# Patient Record
Sex: MALE | Race: WHITE | Employment: FULL TIME | URBAN - METROPOLITAN AREA
[De-identification: names, ages, dates, MRNs, and addresses within clinical notes are randomized per-mention and may not be internally consistent; named-entity substitution may affect disease eponyms.]

---

## 2018-09-16 ENCOUNTER — OFFICE VISIT (OUTPATIENT)
Dept: URGENT CARE | Facility: URGENT CARE | Age: 28
End: 2018-09-16
Payer: COMMERCIAL

## 2018-09-16 ENCOUNTER — APPOINTMENT (OUTPATIENT)
Dept: CT IMAGING | Facility: CLINIC | Age: 28
DRG: 272 | End: 2018-09-16
Attending: EMERGENCY MEDICINE
Payer: COMMERCIAL

## 2018-09-16 ENCOUNTER — APPOINTMENT (OUTPATIENT)
Dept: ULTRASOUND IMAGING | Facility: CLINIC | Age: 28
DRG: 272 | End: 2018-09-16
Attending: EMERGENCY MEDICINE
Payer: COMMERCIAL

## 2018-09-16 ENCOUNTER — HOSPITAL ENCOUNTER (INPATIENT)
Facility: CLINIC | Age: 28
LOS: 3 days | Discharge: HOME | DRG: 272 | End: 2018-09-19
Attending: EMERGENCY MEDICINE | Admitting: HOSPITALIST
Payer: COMMERCIAL

## 2018-09-16 VITALS
DIASTOLIC BLOOD PRESSURE: 70 MMHG | HEART RATE: 86 BPM | TEMPERATURE: 99.1 F | SYSTOLIC BLOOD PRESSURE: 101 MMHG | WEIGHT: 169.56 LBS

## 2018-09-16 DIAGNOSIS — M79.601 PAIN OF RIGHT UPPER EXTREMITY: Primary | ICD-10-CM

## 2018-09-16 DIAGNOSIS — I82.B11: ICD-10-CM

## 2018-09-16 DIAGNOSIS — M79.89 ARM SWELLING: ICD-10-CM

## 2018-09-16 PROBLEM — O22.30 DVT (DEEP VEIN THROMBOSIS) IN PREGNANCY: Status: ACTIVE | Noted: 2018-09-16

## 2018-09-16 LAB
ANION GAP SERPL CALCULATED.3IONS-SCNC: 12 MMOL/L (ref 3–14)
BASOPHILS # BLD AUTO: 0 10E9/L (ref 0–0.2)
BASOPHILS NFR BLD AUTO: 0.4 %
BUN SERPL-MCNC: 9 MG/DL (ref 7–30)
CALCIUM SERPL-MCNC: 9.8 MG/DL (ref 8.5–10.1)
CHLORIDE SERPL-SCNC: 101 MMOL/L (ref 94–109)
CO2 SERPL-SCNC: 25 MMOL/L (ref 20–32)
CREAT SERPL-MCNC: 0.92 MG/DL (ref 0.66–1.25)
D DIMER PPP FEU-MCNC: 0.9 UG/ML FEU (ref 0–0.5)
DIFFERENTIAL METHOD BLD: NORMAL
EOSINOPHIL # BLD AUTO: 0.1 10E9/L (ref 0–0.7)
EOSINOPHIL NFR BLD AUTO: 1 %
ERYTHROCYTE [DISTWIDTH] IN BLOOD BY AUTOMATED COUNT: 11.7 % (ref 10–15)
GFR SERPL CREATININE-BSD FRML MDRD: >90 ML/MIN/1.7M2
GLUCOSE SERPL-MCNC: 85 MG/DL (ref 70–99)
HCT VFR BLD AUTO: 42.2 % (ref 40–53)
HGB BLD-MCNC: 14.8 G/DL (ref 13.3–17.7)
IMM GRANULOCYTES # BLD: 0 10E9/L (ref 0–0.4)
IMM GRANULOCYTES NFR BLD: 0.2 %
LYMPHOCYTES # BLD AUTO: 2.1 10E9/L (ref 0.8–5.3)
LYMPHOCYTES NFR BLD AUTO: 19.7 %
MCH RBC QN AUTO: 30.6 PG (ref 26.5–33)
MCHC RBC AUTO-ENTMCNC: 35.1 G/DL (ref 31.5–36.5)
MCV RBC AUTO: 87 FL (ref 78–100)
MONOCYTES # BLD AUTO: 0.7 10E9/L (ref 0–1.3)
MONOCYTES NFR BLD AUTO: 6.8 %
NEUTROPHILS # BLD AUTO: 7.5 10E9/L (ref 1.6–8.3)
NEUTROPHILS NFR BLD AUTO: 71.9 %
NRBC # BLD AUTO: 0 10*3/UL
NRBC BLD AUTO-RTO: 0 /100
PLATELET # BLD AUTO: 280 10E9/L (ref 150–450)
POTASSIUM SERPL-SCNC: 3.3 MMOL/L (ref 3.4–5.3)
RBC # BLD AUTO: 4.83 10E12/L (ref 4.4–5.9)
SODIUM SERPL-SCNC: 138 MMOL/L (ref 133–144)
WBC # BLD AUTO: 10.5 10E9/L (ref 4–11)

## 2018-09-16 PROCEDURE — 12000000 ZZH R&B MED SURG/OB

## 2018-09-16 PROCEDURE — 93971 EXTREMITY STUDY: CPT | Mod: RT

## 2018-09-16 PROCEDURE — 25000132 ZZH RX MED GY IP 250 OP 250 PS 637: Performed by: HOSPITALIST

## 2018-09-16 PROCEDURE — 25000128 H RX IP 250 OP 636: Performed by: EMERGENCY MEDICINE

## 2018-09-16 PROCEDURE — 99223 1ST HOSP IP/OBS HIGH 75: CPT | Mod: AI | Performed by: HOSPITALIST

## 2018-09-16 PROCEDURE — 85379 FIBRIN DEGRADATION QUANT: CPT | Performed by: EMERGENCY MEDICINE

## 2018-09-16 PROCEDURE — 99207 ZZC CDG-MDM COMPONENT: MEETS MODERATE - UP CODED: CPT | Performed by: HOSPITALIST

## 2018-09-16 PROCEDURE — 71260 CT THORAX DX C+: CPT

## 2018-09-16 PROCEDURE — 99285 EMERGENCY DEPT VISIT HI MDM: CPT | Mod: 25

## 2018-09-16 PROCEDURE — 96376 TX/PRO/DX INJ SAME DRUG ADON: CPT

## 2018-09-16 PROCEDURE — 80048 BASIC METABOLIC PNL TOTAL CA: CPT | Performed by: EMERGENCY MEDICINE

## 2018-09-16 PROCEDURE — 25000128 H RX IP 250 OP 636

## 2018-09-16 PROCEDURE — 25000132 ZZH RX MED GY IP 250 OP 250 PS 637: Performed by: EMERGENCY MEDICINE

## 2018-09-16 PROCEDURE — 96365 THER/PROPH/DIAG IV INF INIT: CPT | Mod: 59

## 2018-09-16 PROCEDURE — 25000125 ZZHC RX 250: Performed by: EMERGENCY MEDICINE

## 2018-09-16 PROCEDURE — 96366 THER/PROPH/DIAG IV INF ADDON: CPT

## 2018-09-16 PROCEDURE — 99207 ZZC OFFICE-HOSPITAL ADMIT: CPT | Performed by: FAMILY MEDICINE

## 2018-09-16 PROCEDURE — 85025 COMPLETE CBC W/AUTO DIFF WBC: CPT | Performed by: EMERGENCY MEDICINE

## 2018-09-16 RX ORDER — HYDROCODONE BITARTRATE AND ACETAMINOPHEN 5; 325 MG/1; MG/1
1 TABLET ORAL EVERY 4 HOURS PRN
Status: DISCONTINUED | OUTPATIENT
Start: 2018-09-16 | End: 2018-09-17

## 2018-09-16 RX ORDER — LIDOCAINE 40 MG/G
CREAM TOPICAL
Status: DISCONTINUED | OUTPATIENT
Start: 2018-09-16 | End: 2018-09-19 | Stop reason: HOSPADM

## 2018-09-16 RX ORDER — PROCHLORPERAZINE MALEATE 10 MG
10 TABLET ORAL EVERY 6 HOURS PRN
Status: DISCONTINUED | OUTPATIENT
Start: 2018-09-16 | End: 2018-09-17

## 2018-09-16 RX ORDER — POTASSIUM CHLORIDE 7.45 MG/ML
10 INJECTION INTRAVENOUS
Status: DISCONTINUED | OUTPATIENT
Start: 2018-09-16 | End: 2018-09-19 | Stop reason: HOSPADM

## 2018-09-16 RX ORDER — HYDROCODONE BITARTRATE AND ACETAMINOPHEN 5; 325 MG/1; MG/1
1 TABLET ORAL
Status: ON HOLD | COMMUNITY
End: 2018-09-19

## 2018-09-16 RX ORDER — POTASSIUM CHLORIDE 1.5 G/1.58G
20-40 POWDER, FOR SOLUTION ORAL
Status: DISCONTINUED | OUTPATIENT
Start: 2018-09-16 | End: 2018-09-19 | Stop reason: HOSPADM

## 2018-09-16 RX ORDER — ONDANSETRON 2 MG/ML
4 INJECTION INTRAMUSCULAR; INTRAVENOUS EVERY 6 HOURS PRN
Status: DISCONTINUED | OUTPATIENT
Start: 2018-09-16 | End: 2018-09-17

## 2018-09-16 RX ORDER — OXYCODONE HYDROCHLORIDE 5 MG/1
5 TABLET ORAL ONCE
Status: COMPLETED | OUTPATIENT
Start: 2018-09-16 | End: 2018-09-16

## 2018-09-16 RX ORDER — AMOXICILLIN 250 MG
1 CAPSULE ORAL 2 TIMES DAILY PRN
Status: DISCONTINUED | OUTPATIENT
Start: 2018-09-16 | End: 2018-09-19 | Stop reason: HOSPADM

## 2018-09-16 RX ORDER — SODIUM CHLORIDE 9 MG/ML
INJECTION, SOLUTION INTRAVENOUS ONCE
Status: COMPLETED | OUTPATIENT
Start: 2018-09-16 | End: 2018-09-16

## 2018-09-16 RX ORDER — ONDANSETRON 4 MG/1
4 TABLET, ORALLY DISINTEGRATING ORAL EVERY 6 HOURS PRN
Status: DISCONTINUED | OUTPATIENT
Start: 2018-09-16 | End: 2018-09-17

## 2018-09-16 RX ORDER — POTASSIUM CHLORIDE 1500 MG/1
20-40 TABLET, EXTENDED RELEASE ORAL
Status: DISCONTINUED | OUTPATIENT
Start: 2018-09-16 | End: 2018-09-19 | Stop reason: HOSPADM

## 2018-09-16 RX ORDER — PROCHLORPERAZINE 25 MG
25 SUPPOSITORY, RECTAL RECTAL EVERY 12 HOURS PRN
Status: DISCONTINUED | OUTPATIENT
Start: 2018-09-16 | End: 2018-09-17

## 2018-09-16 RX ORDER — POTASSIUM CL/LIDO/0.9 % NACL 10MEQ/0.1L
10 INTRAVENOUS SOLUTION, PIGGYBACK (ML) INTRAVENOUS
Status: DISCONTINUED | OUTPATIENT
Start: 2018-09-16 | End: 2018-09-19 | Stop reason: HOSPADM

## 2018-09-16 RX ORDER — IOPAMIDOL 755 MG/ML
65 INJECTION, SOLUTION INTRAVASCULAR ONCE
Status: COMPLETED | OUTPATIENT
Start: 2018-09-16 | End: 2018-09-16

## 2018-09-16 RX ORDER — LORAZEPAM 1 MG/1
1 TABLET ORAL ONCE
Status: COMPLETED | OUTPATIENT
Start: 2018-09-16 | End: 2018-09-16

## 2018-09-16 RX ORDER — POLYETHYLENE GLYCOL 3350 17 G/17G
17 POWDER, FOR SOLUTION ORAL DAILY PRN
Status: DISCONTINUED | OUTPATIENT
Start: 2018-09-16 | End: 2018-09-19 | Stop reason: HOSPADM

## 2018-09-16 RX ORDER — CLINDAMYCIN HCL 300 MG
300 CAPSULE ORAL 4 TIMES DAILY
Status: DISCONTINUED | OUTPATIENT
Start: 2018-09-16 | End: 2018-09-19 | Stop reason: HOSPADM

## 2018-09-16 RX ORDER — POTASSIUM CHLORIDE 29.8 MG/ML
20 INJECTION INTRAVENOUS
Status: DISCONTINUED | OUTPATIENT
Start: 2018-09-16 | End: 2018-09-19 | Stop reason: HOSPADM

## 2018-09-16 RX ORDER — CLINDAMYCIN HCL 300 MG
300 CAPSULE ORAL 4 TIMES DAILY
COMMUNITY
End: 2018-09-26

## 2018-09-16 RX ORDER — AMOXICILLIN 250 MG
2 CAPSULE ORAL 2 TIMES DAILY PRN
Status: DISCONTINUED | OUTPATIENT
Start: 2018-09-16 | End: 2018-09-19 | Stop reason: HOSPADM

## 2018-09-16 RX ORDER — NALOXONE HYDROCHLORIDE 0.4 MG/ML
.1-.4 INJECTION, SOLUTION INTRAMUSCULAR; INTRAVENOUS; SUBCUTANEOUS
Status: DISCONTINUED | OUTPATIENT
Start: 2018-09-16 | End: 2018-09-17

## 2018-09-16 RX ADMIN — LORAZEPAM 1 MG: 1 TABLET ORAL at 17:17

## 2018-09-16 RX ADMIN — OXYCODONE HYDROCHLORIDE 5 MG: 5 TABLET ORAL at 18:32

## 2018-09-16 RX ADMIN — IOPAMIDOL 65 ML: 755 INJECTION, SOLUTION INTRAVENOUS at 18:13

## 2018-09-16 RX ADMIN — HEPARIN SODIUM 1400 UNITS/HR: 10000 INJECTION, SOLUTION INTRAVENOUS at 18:22

## 2018-09-16 RX ADMIN — NICOTINE POLACRILEX 2 MG: 2 GUM, CHEWING ORAL at 22:59

## 2018-09-16 RX ADMIN — HYDROCODONE BITARTRATE AND ACETAMINOPHEN 1 TABLET: 5; 325 TABLET ORAL at 22:59

## 2018-09-16 RX ADMIN — CLINDAMYCIN HYDROCHLORIDE 300 MG: 300 CAPSULE ORAL at 22:26

## 2018-09-16 RX ADMIN — POTASSIUM CHLORIDE 40 MEQ: 1500 TABLET, EXTENDED RELEASE ORAL at 22:26

## 2018-09-16 RX ADMIN — SODIUM CHLORIDE, PRESERVATIVE FREE 90 ML: 5 INJECTION INTRAVENOUS at 18:13

## 2018-09-16 RX ADMIN — SODIUM CHLORIDE: 9 INJECTION, SOLUTION INTRAVENOUS at 19:13

## 2018-09-16 RX ADMIN — SODIUM CHLORIDE 1000 ML: 9 INJECTION, SOLUTION INTRAVENOUS at 18:24

## 2018-09-16 RX ADMIN — Medication 6100 UNITS: at 18:23

## 2018-09-16 ASSESSMENT — ENCOUNTER SYMPTOMS
WEAKNESS: 1
FEVER: 0
NUMBNESS: 1

## 2018-09-16 NOTE — PROGRESS NOTES
SUBJECTIVE: Rickie Lagos is a 27 year old male presenting with a chief complaint of acute onset of bad rt arm pain with redness and swelling.  Onset of symptoms was today ago.  Course of illness is worsening.    Severity moderate  Current and Associated symptoms: tightness of arm  Treatment measures tried include None tried.  Predisposing factors include None.    No past medical history on file.  Allergies   Allergen Reactions     Amoxicillin Rash     Social History   Substance Use Topics     Smoking status: Current Every Day Smoker     Types: Other     Smokeless tobacco: Never Used     Alcohol use Not on file       ROS:  SKIN: no rash  GI: no vomiting    OBJECTIVE:  /70  Pulse 86  Temp 99.1  F (37.3  C) (Oral)  Wt 169 lb 9 oz (76.9 kg)   GENERAL APPEARANCE: healthy, alert and no distress  NEURO: Normal strength and tone, sensory exam grossly normal,  normal speech and mentation  SKIN: redness  Rt arm swelling and very painful      ICD-10-CM    1. Pain of right upper extremity M79.601    2. Arm swelling M79.89      Will go through ED for cont w/u

## 2018-09-16 NOTE — IP AVS SNAPSHOT
[facility]    [address] [place] [address]    Phone:  [phone]                                       After Visit Summary   9/16/2018    Rickie Lagos    MRN: [id number]           After Visit Summary Signature Page     I have received my discharge instructions, and my questions have been answered. I have discussed any challenges I see with this plan with the nurse or doctor.    ..........................................................................................................................................  Patient/Patient Representative Signature      ..........................................................................................................................................  Patient Representative Print Name and Relationship to Patient    ..................................................               ................................................  Date                                   Time    ..........................................................................................................................................  Reviewed by Signature/Title    ...................................................              ..............................................  Date                                               Time          22EPIC Rev 08/18

## 2018-09-16 NOTE — MR AVS SNAPSHOT
"              After Visit Summary   2018    Rickie Lagos    MRN: [id number]           Patient Information     Date Of Birth          1990        Visit Information        Provider Department      2018 2:25 PM Jey Rodriguez,  [facility]        Today's Diagnoses     Pain of right upper extremity    -  1    Arm swelling           Follow-ups after your visit        Who to contact     If you have questions or need follow up information about today's clinic visit or your schedule please contact [facility] directly at [phone].  Normal or non-critical lab and imaging results will be communicated to you by Glassbeam[employer], letter or phone within 4 business days after the clinic has received the results. If you do not hear from us within 7 days, please contact the clinic through Glassbeam[employer] or phone. If you have a critical or abnormal lab result, we will notify you by phone as soon as possible.  Submit refill requests through [employer] or call your pharmacy and they will forward the refill request to us. Please allow 3 business days for your refill to be completed.          Additional Information About Your Visit        MyChart Information     [employer] lets you send messages to your doctor, view your test results, renew your prescriptions, schedule appointments and more. To sign up, go to [email or web address] . Click on \"Log in\" on the left side of the screen, which will take you to the Welcome page. Then click on \"Sign up Now\" on the right side of the page.     You will be asked to enter the access code listed below, as well as some personal information. Please follow the directions to create your username and password.     Your access code is: [email or web address]  Expires: 12/15/2018  2:56 PM     Your access code will  in 90 days. If you need help or a new code, please call your [facility] clinic or [phone].        Care EveryWhere ID     This is " your Care EveryWhere ID. This could be used by other organizations to access your [facility] medical records  [id number]        Your Vitals Were     Pulse Temperature                86 99.1  F (37.3  C) (Oral)           Blood Pressure from Last 3 Encounters:   09/16/18 101/70    Weight from Last 3 Encounters:   09/16/18 169 lb 9 oz (76.9 kg)              Today, you had the following     No orders found for display      Information about OPIOIDS     PRESCRIPTION OPIOIDS: WHAT YOU NEED TO KNOW   We gave you an opioid (narcotic) pain medicine. It is important to manage your pain, but opioids are not always the best choice. You should first try all the other options your care team gave you. Take this medicine for as short a time (and as few doses) as possible.    Some activities can increase your pain, such as bandage changes or therapy sessions. It may help to take your pain medicine 30 to 60 minutes before these activities. Reduce your stress by getting enough sleep, working on hobbies you enjoy and practicing relaxation or meditation. Talk to your care team about ways to manage your pain beyond prescription opioids.    These medicines have risks:    DO NOT drive when on new or higher doses of pain medicine. These medicines can affect your alertness and reaction times, and you could be arrested for driving under the influence (DUI). If you need to use opioids long-term, talk to your care team about driving.    DO NOT operate heavy machinery    DO NOT do any other dangerous activities while taking these medicines.    DO NOT drink any alcohol while taking these medicines.     If the opioid prescribed includes acetaminophen, DO NOT take with any other medicines that contain acetaminophen. Read all labels carefully. Look for the word  acetaminophen  or  Tylenol.  Ask your pharmacist if you have questions or are unsure.    You can get addicted to pain medicines, especially if you have a history of addiction (chemical,  alcohol or substance dependence). Talk to your care team about ways to reduce this risk.    All opioids tend to cause constipation. Drink plenty of water and eat foods that have a lot of fiber, such as fruits, vegetables, prune juice, apple juice and high-fiber cereal. Take a laxative (Miralax, milk of magnesia, Colace, Senna) if you don t move your bowels at least every other day. Other side effects include upset stomach, sleepiness, dizziness, throwing up, tolerance (needing more of the medicine to have the same effect), physical dependence and slowed breathing.    Store your pills in a secure place, locked if possible. We will not replace any lost or stolen medicine. If you don t finish your medicine, please throw away (dispose) as directed by your pharmacist. The [place] [employer] has more information about safe disposal: [email or web address]         Primary Care Provider Fax #    Physician No Ref-Primary [phone]       No address on file        Equal Access to Services     LIVIA DASH : Hadii aad ku hadasho Soomaali, waaxda luqadaha, qaybta kaalmada adeegyada, lashonda [place] . So [facility] [phone].    ATENCIÓN: Si habla español, tiene a tariq disposición servicios gratuitos de asistencia lingüística. [facility] [phone].    We comply with applicable federal civil rights laws and [place] laws. We do not discriminate on the basis of race, color, national origin, age, disability, sex, sexual orientation, or gender identity.            Thank you!     Thank you for choosing [facility]  for your care. Our goal is always to provide you with excellent care. Hearing back from our patients is one way we can continue to improve our services. Please take a few minutes to complete the written survey that you may receive in the mail after your visit with us. Thank you!             Your Updated  Medication List - Protect others around you: Learn how to safely use, store and throw away your medicines at www.disposemymeds.org.          This list is accurate as of 9/16/18  2:56 PM.  Always use your most recent med list.                   Brand Name Dispense Instructions for use Diagnosis    HYDROCODONE-ACETAMINOPHEN PO      Take by mouth as needed        UNKNOWN TO PATIENT      Taking a antibiotic.

## 2018-09-16 NOTE — ED PROVIDER NOTES
"  History     Chief Complaint:  Arm Pain     HPI   Rickie Lagos is a 27 year old male who presents to emergency department from urgent care for the evaluation of arm pain. The patient reports earlier today, he suddenly began experiencing numbness and tingling in his right arm. He reports his arm feels really uncomfortable making it difficult to use it. Additionally, the patient reports his right arm had some purple discoloration, his right hand was splotchy red, and it felt like his veins were \"bulging out\". He reports he never experienced anything like this before. The patient reports he did unload some groceries and a small bag of charcoal today, but denies any injury. He also denies any fevers. Of note, the patient reports he had his wisdom teeth removed on Thursday and has been on antibiotics and pain killers since then. He also reports he goes to a chiropractor for neck and back adjustments with his last visit 2 weeks ago.     Allergies:  Amoxicillin    Medications:    Hydrocodone-acetaminophen     Past Medical History:    History reviewed. No pertinent past medical history.    Past Surgical History:    History reviewed. No pertinent surgical history.    Family History:    No family history on file.    Social History:  Smoking status: Current everyday smoker  Alcohol use: Yes  Marital Status:  Single [1]     Review of Systems   Constitutional: Negative for fever.   Neurological: Positive for weakness and numbness.   10 point review of systems performed and is negative except as above and in HPI.    Physical Exam     Patient Vitals for the past 24 hrs:   BP Temp Temp src Pulse Resp SpO2 Height Weight   09/16/18 1945 - - - - - 95 % - -   09/16/18 1930 - - - - - 99 % - -   09/16/18 1845 - - - - - 100 % - -   09/16/18 1830 - - - - - 100 % - -   09/16/18 1800 - - - - - 99 % - -   09/16/18 1715 - - - - - 98 % - -   09/16/18 1521 (!) 120/99 98.2  F (36.8  C) Oral 83 16 100 % 1.93 m (6' 4\") 76.7 kg (169 lb) "       Physical Exam  General: Resting on the gurney, appears comfortable  Head:  The scalp, face, and head appear normal  Mouth/Throat: Mucus membranes are moist    Mouth: No trismus. Floor of the mouth is soft. No swelling of the neck. Normal postoperative wisdom tooth extraction sites. No active bleeding.   CV:  Regular rate    Normal S1 and S2  No pathological murmur   Resp:  Breath sounds clear and equal bilaterally    Non-labored, no retractions or accessory muscle use    No coarseness    No wheezing   GI:  Abdomen is soft, no rigidity    No tenderness to palpation  MS:  Normal motor assessment of all extremities.    Good capillary refill noted.    Brisk capillary refill in the fingers. Normal sensation in the right hand and arm.   Skin:  No rash or lesions noted.  Neuro:   Speech is normal and fluent. No apparent deficit.  Psych: Awake. Alert.  Normal affect.      Appropriate interactions.    Emergency Department Course     Imaging:  Radiographic findings were communicated with the patient who voiced understanding of the findings.    US Right Upper Extremity Venous Duplex  Occlusive thrombus in the right subclavian and axillary veins.  As read by Radiology.    Chest CT, IV contrast only - PE protocol  1. No evidence for pulmonary embolism.  2. Mild splenomegaly.    As read by Radiology.    Laboratory:  CBC: WNL (WBC 10.5, HGB 14.8, )  BMP: Potassium 3.3 (L), O/W WNL (Creatinine 0.92)  D dimer: 0.9 (H)    Interventions:  1717: Ativan 1 mg oral  1822: Heparin drip 25,000 units IV  1823: Heparin loading dose 6,100 units IV   1824: NS 1L IV Bolus  1832: Oxycodone 5 mg oral  1913: NS infusion 75 mL IV     Emergency Department Course:  Past medical records, nursing notes, and vitals reviewed.  1541: I performed an exam of the patient and obtained history, as documented above.  IV inserted and blood drawn.  The patient was sent for a right upper extremity US and chest CT while in the emergency department,  findings above.    1617: I rechecked the patient.     Findings and plan explained to the Patient who consents to admission.     1902: Discussed the patient with Dr. Cueto, who will admit the patient to an inpatient med bed for further monitoring, evaluation, and treatment.     Impression & Plan      Medical Decision Making:  Rickie Lagos is a 27 year old male who presents for evaluation of right arm numbness and tingling.  The workup in the Emergency Department indicates this is due to right subclavian vein acute thrombosis. This started after an AC IV on this side but there is no distal thrombosis.  This may also be secodnary to thoracic outlet syndrome or perhaps related to positioning during his extraction. This was discussed with the patient.  There are no symptoms to suggest this has progressed to pulmonary embolism.  Heparin was initiated after discussion with the patient after clarification of any contraindications to this therapy.  There are none, but patient understands the risk/benefit ratio to this therapy and the possibility of serious and/or life-threatening hemorrhage and slight increased risk with recent dental extraction.  I will admit the patient to medicine team for further workup and evaluation.  There is no indication at this point for thrombolysis or contacting the interventional team for directed thrombolytics.     Diagnosis:    ICD-10-CM   1. Acute thrombosis of right subclavian vein (H) I82.B11       Disposition:  Admitted to inpatient med bed by Dr. Nory Allen  9/16/2018    EMERGENCY DEPARTMENT  Erika PACHECO, am serving as a scribe at 3:41 PM on 9/16/2018 to document services personally performed by Tere Lester MD based on my observations and the provider's statements to me.        Tere Lester MD  09/21/18 1157

## 2018-09-16 NOTE — ED NOTES
"[facility]  ED Nurse Handoff Report    ED Chief complaint: Arm Pain (wisdom teeth removed on thursday and has been on antibx and pain killers; today pt's arm suddenly started feeling numbness and tingling and feels like his veins \"are bulging out\".  No known injury)      ED Diagnosis:   Final diagnoses:   None       Code Status: to be addressed by admitting doctor    Allergies:   Allergies   Allergen Reactions     Amoxicillin Rash       Activity level - Baseline/Home:  Independent    Activity Level - Current:   Independent     Needed?: No    Isolation: No  Infection: Not Applicable  Bariatric?: No    Vital Signs:   Vitals:    09/16/18 1521 09/16/18 1715 09/16/18 1800 09/16/18 1830   BP: (!) 120/99      Pulse: 83      Resp: 16      Temp: 98.2  F (36.8  C)      TempSrc: Oral      SpO2: 100% 98% 99% 100%   Weight: 76.7 kg (169 lb)      Height: 1.93 m (6' 4\")          Cardiac Rhythm: ,        Pain level: 0-10 Pain Scale: 7    Is this patient confused?: No   [place] - Suicide Severity Rating Scale Completed?  Yes  If yes, what color did the patient score?  White    Patient Report: Initial Complaint: pt c/o tingling in R arm sudden onset with feeling of discomfort with some redness to hand and vein distention reported. Pt vargas all 4 wisdom teeth removed on Thursday and had been on antibiotics. Denies any bleeding from surgical sites at this time. Denies any trauma. VSS, denies CP, no SOB.     Tests Performed: labs US CT  Abnormal Results: D dimer 0.9, Potassium 3.3  Treatments provided: PO lorazepam, PO Oxycodone (for dental pain), Heparin infusing per Dr order.     Family Comments: Mother and sig other bedside    OBS brochure/video discussed/provided to patient: N/A    ED Medications:   Medications   lidocaine 1 % 0.2 mL (not administered)   heparin drip 25,000 units in 0.45% NaCl 250 mL (1,400 Units/hr Intravenous New Bag 9/16/18 9941)   0.9% sodium chloride BOLUS (1,000 mLs Intravenous New " Bag 9/16/18 1824)   LORazepam (ATIVAN) tablet 1 mg (1 mg Oral Given 9/16/18 1717)   heparin Loading Dose bolus dose from infusion pump 6,100 Units (6,100 Units Intravenous Given 9/16/18 1823)   iopamidol (ISOVUE-370) solution 65 mL (65 mLs Intravenous Given 9/16/18 1813)   Saline flush (90 mLs Intravenous Given 9/16/18 1813)   oxyCODONE IR (ROXICODONE) tablet 5 mg (5 mg Oral Given 9/16/18 1832)       Drips infusing?:  Yes    For the majority of the shift this patient was Green.   Interventions performed were none.    Severe Sepsis OR Septic Shock Diagnosis Present: No      ED NURSE PHONE NUMBER: *[phone]

## 2018-09-16 NOTE — IP AVS SNAPSHOT
MRN:[id number]                      After Visit Summary   9/16/2018    Rickie Lagos    MRN: [id number]           Thank you!     Thank you for choosing [facility] for your care. Our goal is always to provide you with excellent care. Hearing back from our patients is one way we can continue to improve our services. Please take a few minutes to complete the written survey that you may receive in the mail after you visit with us. Thank you!        Patient Information     Date Of Birth          1990        Designated Caregiver       Most Recent Value    Caregiver    Will someone help with your care after discharge? yes    Name of designated caregiver Aura Brownlee, significant other    Phone number of caregiver [phone]    Caregiver address [place], [place]      About your hospital stay     You were admitted on:  September 16, 2018 You last received care in the:  [facility] [address] [facility]    You were discharged on:  September 19, 2018        Reason for your hospital stay       You were admitted to the hospital secondary to right upper extremity DVT                  Who to Call     For medical emergencies, please call 911.  For non-urgent questions about your medical care, please call your primary care provider or clinic, None          Attending Provider     Provider Specialty    Tere Lester MD Emergency Medicine    Ezio Cueto MD Internal Medicine    Nemo Montalvo MD Internal Medicine       Primary Care Provider Fax #    Physician No Ref-Primary [phone]      After Care Instructions     Activity       Your activity upon discharge: activity as tolerated and no driving for today            Diet       Follow this diet upon discharge: Orders Placed This Encounter      Regular Diet Adult              Discharge Instructions                 Follow-up Appointments     Follow-up and recommended labs and tests       Follow up with primary care provider, Physician No  Ref-Primary, within 7 days to evaluate treatment change and for hospital follow- up.  The following labs/tests are recommended: BMP and CBC.            Follow-up and recommended labs and tests        Follow up with Dr. Moon on 9/26/18 at 12:10 pm at the [facility]. You will need an ultrasound of your arm done just prior to this at the same clinic. Please arrive at 10:45 for an 11:00 ultrasound. If you need to reschedule or have any questions, please call [phone].     The surgery scheduler will call you in regards to time surgery will be scheduled with Dr. Madden.            Follow-up and recommended labs and tests        Please call to setup a follow-up appointment in 1-2 weeks with Dr. Madden (Vascular Surgery) unless previously scheduled or otherwise specified.                  Your next 10 appointments already scheduled     Sep 26, 2018 11:00 AM CDT   US VENOUS with SHVUS4   [facility]    [address]  [place] [address]   [phone]           How do I prepare for my exam? (Food and drink instructions) No Food and Drink Restrictions.  How do I prepare for my exam? (Other instructions) You do not need to do anything special to prepare for your exam.  What should I wear: Wear comfortable clothes.  How long does the exam take: Most ultrasounds take 30 to 60 minutes.  What should I bring: Bring a list of your medicines, including vitamins, minerals and over-the-counter drugs. It is safest to leave personal items at home.  Do I need a :  No  is needed.  What do I need to tell my doctor: Tell your doctor about any allergies you may have.  What should I do after the exam: No restrictions, You may resume normal activities.  What is this test: An ultrasound uses sound waves to make pictures of the body. Sound waves do not cause pain. The only discomfort may be the pressure of  "the wand against your skin or full bladder.  Who should I call with questions: If you have any questions, please call the Imaging Department where you will have your exam. Directions, parking instructions, and other information is available on our website, [email or web address].            Sep 26, 2018 12:10 PM CDT   Return Visit with Luther Moon MD   [facility] ([facility])    [address]  [facility] [address] [phone]            Oct 05, 2018   Procedure with Kong Madden MD   [facility] (--)    [address]  [facility] [address] [phone]              Future tests that were ordered for you     US Upper Extremity Venous Duplex Right                 Pending Results     No orders found from 9/14/2018 to 9/17/2018.            Statement of Approval     Ordered          09/19/18 5884  I have reviewed and agree with all the recommendations and orders detailed in this document.  EFFECTIVE NOW     Approved and electronically signed by:  Nemo Montalvo MD             Admission Information     Date & Time Provider Department Dept. Phone    9/16/2018 Nemo Montalvo MD [facility] [phone]      Your Vitals Were     Blood Pressure Pulse Temperature Respirations Height Weight    141/102 55 98  F (36.7  C) (Axillary) 8 1.93 m (6' 4\") 76.7 kg (169 lb)    Pulse Oximetry BMI (Body Mass Index)                100% 20.57 kg/m2          [employer] Information     [employer] lets you send messages to your doctor, view your test results, renew your prescriptions, schedule appointments and more. To sign up, go to [email or web address] . Click on \"Log in\" on the left side of the screen, which will take you to the Welcome page. Then click on \"Sign up Now\" on the right side of the page.     You will be asked to enter the access code listed below, as well as " some personal information. Please follow the directions to create your username and password.     Your access code is: [email or web address]  Expires: 12/15/2018  2:56 PM     Your access code will  in 90 days. If you need help or a new code, please call your [facility] clinic or [phone].        Care EveryWhere ID     This is your Care EveryWhere ID. This could be used by other organizations to access your [facility] medical records  [id number]        Equal Access to Services     LIVIA DASH : Hadii erika ku hadasho Soomaali, waaxda luqadaha, qaybta kaalmada adeegyada, lashonda h[place] . So [facility] [phone].    ATENCIÓN: Si habla español, tiene a tariq disposición servicios gratuitos de asistencia lingüística. Llame al [phone].    We comply with applicable federal civil rights laws and [place] laws. We do not discriminate on the basis of race, color, national origin, age, disability, sex, sexual orientation, or gender identity.               Review of your medicines      START taking        Dose / Directions    rivaroxaban ANTICOAGULANT 15 MG Tabs tablet   Commonly known as:  XARELTO   Used for:  Acute thrombosis of right subclavian vein (H)        Dose:  15 mg   Take 1 tablet (15 mg) by mouth 2 times daily (with meals)   Quantity:  42 tablet   Refills:  0         CONTINUE these medicines which may have CHANGED, or have new prescriptions. If we are uncertain of the size of tablets/capsules you have at home, strength may be listed as something that might have changed.        Dose / Directions    HYDROcodone-acetaminophen 5-325 MG per tablet   Commonly known as:  NORCO   This may have changed:    - when to take this  - reasons to take this   Used for:  Acute thrombosis of right subclavian vein (H)        Dose:  1 tablet   Take 1 tablet by mouth every 6 hours as needed for severe pain Every 4 hours to every 6 hours as needed   Quantity:  12 tablet   Refills:  0         CONTINUE these medicines  which have NOT CHANGED        Dose / Directions    clindamycin 300 MG capsule   Commonly known as:  CLEOCIN        Dose:  300 mg   Take 300 mg by mouth 4 times daily   Refills:  0            Where to get your medicines      These medications were sent to [facility] - [address]  [address] [place] [address]     Phone:  [phone]     rivaroxaban ANTICOAGULANT 15 MG Tabs tablet         Some of these will need a paper prescription and others can be bought over the counter. Ask your nurse if you have questions.     Bring a paper prescription for each of these medications     HYDROcodone-acetaminophen 5-325 MG per tablet                Protect others around you: Learn how to safely use, store and throw away your medicines at www.disposemymeds.org.        ANTIBIOTIC INSTRUCTION     You've Been Prescribed an Antibiotic - Now What?  Your healthcare team thinks that you or your loved one might have an infection. Some infections can be treated with antibiotics, which are powerful, life-saving drugs. Like all medications, antibiotics have side effects and should only be used when necessary. There are some important things you should know about your antibiotic treatment.      Your healthcare team may run tests before you start taking an antibiotic.    Your team may take samples (e.g., from your blood, urine or other areas) to run tests to look for bacteria. These test can be important to determine if you need an antibiotic at all and, if you do, which antibiotic will work best.      Within a few days, your healthcare team might change or even stop your antibiotic.    Your team may start you on an antibiotic while they are working to find out what is making you sick.    Your team might change your antibiotic because test results show that a different antibiotic would be better to treat your infection.    In some cases, once your team has more information, they learn that you  do not need an antibiotic at all. They may find out that you don't have an infection, or that the antibiotic you're taking won't work against your infection. For example, an infection caused by a virus can't be treated with antibiotics. Staying on an antibiotic when you don't need it is more likely to be harmful than helpful.      You may experience side effects from your antibiotic.    Like all medications, antibiotics have side effects. Some of these can be serious.    Let you healthcare team know if you have any known allergies when you are admitted to the hospital.    One significant side effect of nearly all antibiotics is the risk of severe and sometimes deadly diarrhea caused by Clostridium difficile (C. Difficile). This occurs when a person takes antibiotics because some good germs are destroyed. Antibiotic use allows C. diificile to take over, putting patients at high risk for this serious infection.    As a patient or caregiver, it is important to understand your or your loved one's antibiotic treatment. It is especially important for caregivers to speak up when patients can't speak for themselves. Here are some important questions to ask your healthcare team.    What infection is this antibiotic treating and how do you know I have that infection?    What side effects might occur from this antibiotic?    How long will I need to take this antibiotic?    Is it safe to take this antibiotic with other medications or supplements (e.g., vitamins) that I am taking?     Are there any special directions I need to know about taking this antibiotic? For example, should I take it with food?    How will I be monitored to know whether my infection is responding to the antibiotic?    What tests may help to make sure the right antibiotic is prescribed for me?      Information provided by:  www.cdc.gov/getsmart  U.S. Department of Health and Human Services  Centers for disease Control and Prevention  National Center for  Emerging and Zoonotic Infectious Diseases  Division of Healthcare Quality Promotion        Information about OPIOIDS     PRESCRIPTION OPIOIDS: WHAT YOU NEED TO KNOW   We gave you an opioid (narcotic) pain medicine. It is important to manage your pain, but opioids are not always the best choice. You should first try all the other options your care team gave you. Take this medicine for as short a time (and as few doses) as possible.    Some activities can increase your pain, such as bandage changes or therapy sessions. It may help to take your pain medicine 30 to 60 minutes before these activities. Reduce your stress by getting enough sleep, working on hobbies you enjoy and practicing relaxation or meditation. Talk to your care team about ways to manage your pain beyond prescription opioids.    These medicines have risks:    DO NOT drive when on new or higher doses of pain medicine. These medicines can affect your alertness and reaction times, and you could be arrested for driving under the influence (DUI). If you need to use opioids long-term, talk to your care team about driving.    DO NOT operate heavy machinery    DO NOT do any other dangerous activities while taking these medicines.    DO NOT drink any alcohol while taking these medicines.     If the opioid prescribed includes acetaminophen, DO NOT take with any other medicines that contain acetaminophen. Read all labels carefully. Look for the word  acetaminophen  or  Tylenol.  Ask your pharmacist if you have questions or are unsure.    You can get addicted to pain medicines, especially if you have a history of addiction (chemical, alcohol or substance dependence). Talk to your care team about ways to reduce this risk.    All opioids tend to cause constipation. Drink plenty of water and eat foods that have a lot of fiber, such as fruits, vegetables, prune juice, apple juice and high-fiber cereal. Take a laxative (Miralax, milk of magnesia, Colace, Senna) if you  don t move your bowels at least every other day. Other side effects include upset stomach, sleepiness, dizziness, throwing up, tolerance (needing more of the medicine to have the same effect), physical dependence and slowed breathing.    Store your pills in a secure place, locked if possible. We will not replace any lost or stolen medicine. If you don t finish your medicine, please throw away (dispose) as directed by your pharmacist. The [place] Pollution Control Agency has more information about safe disposal: https://www.pca.[facility].mn.us/living-green/managing-unwanted-medications             Medication List: This is a list of all your medications and when to take them. Check marks below indicate your daily home schedule. Keep this list as a reference.      Medications           Morning Afternoon Evening Bedtime As Needed    clindamycin 300 MG capsule   Commonly known as:  CLEOCIN   Take 300 mg by mouth 4 times daily   Last time this was given:  300 mg on 9/19/2018  1:31 PM   Next Dose Due:  6pm 9/19            9am       1pm       6pm       10 pm           HYDROcodone-acetaminophen 5-325 MG per tablet   Commonly known as:  NORCO   Take 1 tablet by mouth every 6 hours as needed for severe pain Every 4 hours to every 6 hours as needed   Last time this was given:  2 tablets on 9/19/2018  1:31 PM   Next Dose Due:  Take 4 hours after you last dose, as needed for pain. Next available 5:31pm                                   rivaroxaban ANTICOAGULANT 15 MG Tabs tablet   Commonly known as:  XARELTO   Take 1 tablet (15 mg) by mouth 2 times daily (with meals)   Last time this was given:  15 mg on 9/19/2018  2:44 PM   Next Dose Due:  Before bed on 9/19. Take with food.            9am           6pm

## 2018-09-17 ENCOUNTER — APPOINTMENT (OUTPATIENT)
Dept: INTERVENTIONAL RADIOLOGY/VASCULAR | Facility: CLINIC | Age: 28
DRG: 272 | End: 2018-09-17
Attending: STUDENT IN AN ORGANIZED HEALTH CARE EDUCATION/TRAINING PROGRAM
Payer: COMMERCIAL

## 2018-09-17 ENCOUNTER — APPOINTMENT (OUTPATIENT)
Dept: GENERAL RADIOLOGY | Facility: CLINIC | Age: 28
DRG: 272 | End: 2018-09-17
Attending: PHYSICIAN ASSISTANT
Payer: COMMERCIAL

## 2018-09-17 LAB
LMWH PPP CHRO-ACNC: 0.78 IU/ML
LMWH PPP CHRO-ACNC: 1 IU/ML
POTASSIUM SERPL-SCNC: 4.5 MMOL/L (ref 3.4–5.3)

## 2018-09-17 PROCEDURE — 85610 PROTHROMBIN TIME: CPT | Performed by: STUDENT IN AN ORGANIZED HEALTH CARE EDUCATION/TRAINING PROGRAM

## 2018-09-17 PROCEDURE — 40000886 ZZH STATISTIC STEP DOWN HRS DAY

## 2018-09-17 PROCEDURE — 25000128 H RX IP 250 OP 636: Performed by: RADIOLOGY

## 2018-09-17 PROCEDURE — 25000128 H RX IP 250 OP 636: Performed by: INTERNAL MEDICINE

## 2018-09-17 PROCEDURE — 99153 MOD SED SAME PHYS/QHP EA: CPT

## 2018-09-17 PROCEDURE — 27210744 ZZH CATH CR12

## 2018-09-17 PROCEDURE — 25500064 ZZH RX 255 OP 636: Performed by: RADIOLOGY

## 2018-09-17 PROCEDURE — 27210886 ZZH ACCESSORY CR5

## 2018-09-17 PROCEDURE — 27210906 ZZH KIT CR8

## 2018-09-17 PROCEDURE — 40000885 ZZH STATISTIC STEP DOWN HRS EVENING

## 2018-09-17 PROCEDURE — C1769 GUIDE WIRE: HCPCS

## 2018-09-17 PROCEDURE — 25000125 ZZHC RX 250: Performed by: RADIOLOGY

## 2018-09-17 PROCEDURE — 3E04317 INTRODUCTION OF OTHER THROMBOLYTIC INTO CENTRAL VEIN, PERCUTANEOUS APPROACH: ICD-10-PCS | Performed by: RADIOLOGY

## 2018-09-17 PROCEDURE — 40000884 ZZH STATISTIC STEP DOWN HRS NIGHT

## 2018-09-17 PROCEDURE — 27210742 ZZH CATH CR1

## 2018-09-17 PROCEDURE — 99221 1ST HOSP IP/OBS SF/LOW 40: CPT | Performed by: SURGERY

## 2018-09-17 PROCEDURE — 99223 1ST HOSP IP/OBS HIGH 75: CPT | Performed by: INTERNAL MEDICINE

## 2018-09-17 PROCEDURE — 99232 SBSQ HOSP IP/OBS MODERATE 35: CPT | Performed by: INTERNAL MEDICINE

## 2018-09-17 PROCEDURE — 25000132 ZZH RX MED GY IP 250 OP 250 PS 637: Performed by: HOSPITALIST

## 2018-09-17 PROCEDURE — 27210804 ZZH SHEATH CR3

## 2018-09-17 PROCEDURE — 36415 COLL VENOUS BLD VENIPUNCTURE: CPT | Performed by: HOSPITALIST

## 2018-09-17 PROCEDURE — 71045 X-RAY EXAM CHEST 1 VIEW: CPT

## 2018-09-17 PROCEDURE — 84132 ASSAY OF SERUM POTASSIUM: CPT | Performed by: HOSPITALIST

## 2018-09-17 PROCEDURE — 25000132 ZZH RX MED GY IP 250 OP 250 PS 637: Performed by: INTERNAL MEDICINE

## 2018-09-17 PROCEDURE — 12000007 ZZH R&B INTERMEDIATE

## 2018-09-17 PROCEDURE — 85520 HEPARIN ASSAY: CPT | Performed by: HOSPITALIST

## 2018-09-17 PROCEDURE — 25000128 H RX IP 250 OP 636: Performed by: HOSPITALIST

## 2018-09-17 RX ORDER — HYDROMORPHONE HYDROCHLORIDE 1 MG/ML
0.3 INJECTION, SOLUTION INTRAMUSCULAR; INTRAVENOUS; SUBCUTANEOUS ONCE
Status: COMPLETED | OUTPATIENT
Start: 2018-09-17 | End: 2018-09-17

## 2018-09-17 RX ORDER — LIDOCAINE HYDROCHLORIDE 10 MG/ML
INJECTION, SOLUTION INFILTRATION; PERINEURAL
Status: DISCONTINUED
Start: 2018-09-17 | End: 2018-09-17 | Stop reason: HOSPADM

## 2018-09-17 RX ORDER — FENTANYL CITRATE 50 UG/ML
INJECTION, SOLUTION INTRAMUSCULAR; INTRAVENOUS
Status: DISCONTINUED
Start: 2018-09-17 | End: 2018-09-17 | Stop reason: HOSPADM

## 2018-09-17 RX ORDER — PROCHLORPERAZINE 25 MG
25 SUPPOSITORY, RECTAL RECTAL EVERY 12 HOURS PRN
Status: DISCONTINUED | OUTPATIENT
Start: 2018-09-17 | End: 2018-09-19 | Stop reason: HOSPADM

## 2018-09-17 RX ORDER — IOPAMIDOL 612 MG/ML
50 INJECTION, SOLUTION INTRAVASCULAR ONCE
Status: COMPLETED | OUTPATIENT
Start: 2018-09-17 | End: 2018-09-17

## 2018-09-17 RX ORDER — DOCUSATE SODIUM 100 MG/1
100 CAPSULE, LIQUID FILLED ORAL 2 TIMES DAILY
Status: DISCONTINUED | OUTPATIENT
Start: 2018-09-17 | End: 2018-09-19 | Stop reason: HOSPADM

## 2018-09-17 RX ORDER — HYDROMORPHONE HYDROCHLORIDE 1 MG/ML
.3-.5 INJECTION, SOLUTION INTRAMUSCULAR; INTRAVENOUS; SUBCUTANEOUS
Status: DISCONTINUED | OUTPATIENT
Start: 2018-09-17 | End: 2018-09-19 | Stop reason: HOSPADM

## 2018-09-17 RX ORDER — HYDROCODONE BITARTRATE AND ACETAMINOPHEN 5; 325 MG/1; MG/1
1-2 TABLET ORAL EVERY 4 HOURS PRN
Status: DISCONTINUED | OUTPATIENT
Start: 2018-09-17 | End: 2018-09-19 | Stop reason: HOSPADM

## 2018-09-17 RX ORDER — NALOXONE HYDROCHLORIDE 0.4 MG/ML
.1-.4 INJECTION, SOLUTION INTRAMUSCULAR; INTRAVENOUS; SUBCUTANEOUS
Status: DISCONTINUED | OUTPATIENT
Start: 2018-09-17 | End: 2018-09-17 | Stop reason: HOSPADM

## 2018-09-17 RX ORDER — PROCHLORPERAZINE MALEATE 10 MG
10 TABLET ORAL EVERY 6 HOURS PRN
Status: DISCONTINUED | OUTPATIENT
Start: 2018-09-17 | End: 2018-09-19 | Stop reason: HOSPADM

## 2018-09-17 RX ORDER — SODIUM CHLORIDE 9 MG/ML
INJECTION, SOLUTION INTRAVENOUS CONTINUOUS
Status: DISCONTINUED | OUTPATIENT
Start: 2018-09-17 | End: 2018-09-19 | Stop reason: HOSPADM

## 2018-09-17 RX ORDER — LIDOCAINE HYDROCHLORIDE 10 MG/ML
1-30 INJECTION, SOLUTION EPIDURAL; INFILTRATION; INTRACAUDAL; PERINEURAL
Status: COMPLETED | OUTPATIENT
Start: 2018-09-17 | End: 2018-09-17

## 2018-09-17 RX ORDER — HEPARIN SODIUM 10000 [USP'U]/100ML
500 INJECTION, SOLUTION INTRAVENOUS CONTINUOUS
Status: DISCONTINUED | OUTPATIENT
Start: 2018-09-17 | End: 2018-09-19

## 2018-09-17 RX ORDER — FENTANYL CITRATE 50 UG/ML
25-50 INJECTION, SOLUTION INTRAMUSCULAR; INTRAVENOUS EVERY 5 MIN PRN
Status: DISCONTINUED | OUTPATIENT
Start: 2018-09-17 | End: 2018-09-17 | Stop reason: HOSPADM

## 2018-09-17 RX ORDER — LIDOCAINE 40 MG/G
CREAM TOPICAL
Status: DISCONTINUED | OUTPATIENT
Start: 2018-09-17 | End: 2018-09-17 | Stop reason: HOSPADM

## 2018-09-17 RX ORDER — ACETAMINOPHEN 325 MG/1
650 TABLET ORAL EVERY 4 HOURS PRN
Status: DISCONTINUED | OUTPATIENT
Start: 2018-09-17 | End: 2018-09-19 | Stop reason: HOSPADM

## 2018-09-17 RX ORDER — SODIUM CHLORIDE 9 MG/ML
INJECTION, SOLUTION INTRAVENOUS CONTINUOUS
Status: DISCONTINUED | OUTPATIENT
Start: 2018-09-17 | End: 2018-09-17 | Stop reason: HOSPADM

## 2018-09-17 RX ORDER — METOCLOPRAMIDE HYDROCHLORIDE 5 MG/ML
10 INJECTION INTRAMUSCULAR; INTRAVENOUS EVERY 6 HOURS PRN
Status: DISCONTINUED | OUTPATIENT
Start: 2018-09-17 | End: 2018-09-19 | Stop reason: HOSPADM

## 2018-09-17 RX ORDER — ACETAMINOPHEN 650 MG/1
650 SUPPOSITORY RECTAL EVERY 4 HOURS PRN
Status: DISCONTINUED | OUTPATIENT
Start: 2018-09-17 | End: 2018-09-19 | Stop reason: HOSPADM

## 2018-09-17 RX ORDER — FLUMAZENIL 0.1 MG/ML
0.2 INJECTION, SOLUTION INTRAVENOUS
Status: DISCONTINUED | OUTPATIENT
Start: 2018-09-17 | End: 2018-09-17 | Stop reason: HOSPADM

## 2018-09-17 RX ORDER — ONDANSETRON 4 MG/1
4 TABLET, ORALLY DISINTEGRATING ORAL EVERY 6 HOURS PRN
Status: DISCONTINUED | OUTPATIENT
Start: 2018-09-17 | End: 2018-09-19 | Stop reason: HOSPADM

## 2018-09-17 RX ORDER — NALOXONE HYDROCHLORIDE 0.4 MG/ML
.1-.4 INJECTION, SOLUTION INTRAMUSCULAR; INTRAVENOUS; SUBCUTANEOUS
Status: DISCONTINUED | OUTPATIENT
Start: 2018-09-17 | End: 2018-09-19 | Stop reason: HOSPADM

## 2018-09-17 RX ORDER — ONDANSETRON 2 MG/ML
4 INJECTION INTRAMUSCULAR; INTRAVENOUS EVERY 6 HOURS PRN
Status: DISCONTINUED | OUTPATIENT
Start: 2018-09-17 | End: 2018-09-19 | Stop reason: HOSPADM

## 2018-09-17 RX ORDER — METOCLOPRAMIDE 10 MG/1
10 TABLET ORAL EVERY 6 HOURS PRN
Status: DISCONTINUED | OUTPATIENT
Start: 2018-09-17 | End: 2018-09-19 | Stop reason: HOSPADM

## 2018-09-17 RX ADMIN — CLINDAMYCIN HYDROCHLORIDE 300 MG: 300 CAPSULE ORAL at 18:44

## 2018-09-17 RX ADMIN — MIDAZOLAM HYDROCHLORIDE 1 MG: 1 INJECTION, SOLUTION INTRAMUSCULAR; INTRAVENOUS at 13:12

## 2018-09-17 RX ADMIN — HYDROCODONE BITARTRATE AND ACETAMINOPHEN 1 TABLET: 5; 325 TABLET ORAL at 03:30

## 2018-09-17 RX ADMIN — FENTANYL CITRATE 50 MCG: 50 INJECTION INTRAMUSCULAR; INTRAVENOUS at 13:12

## 2018-09-17 RX ADMIN — MIDAZOLAM HYDROCHLORIDE 1 MG: 1 INJECTION, SOLUTION INTRAMUSCULAR; INTRAVENOUS at 13:18

## 2018-09-17 RX ADMIN — Medication 0.5 MG: at 16:34

## 2018-09-17 RX ADMIN — HEPARIN SODIUM 500 UNITS/HR: 10000 INJECTION, SOLUTION INTRAVENOUS at 13:55

## 2018-09-17 RX ADMIN — Medication 1 MG: at 00:27

## 2018-09-17 RX ADMIN — IOPAMIDOL 12 ML: 612 INJECTION, SOLUTION INTRAVENOUS at 13:52

## 2018-09-17 RX ADMIN — HYDROCODONE BITARTRATE AND ACETAMINOPHEN 1 TABLET: 5; 325 TABLET ORAL at 14:31

## 2018-09-17 RX ADMIN — FENTANYL CITRATE 50 MCG: 50 INJECTION INTRAMUSCULAR; INTRAVENOUS at 13:18

## 2018-09-17 RX ADMIN — CLINDAMYCIN HYDROCHLORIDE 300 MG: 300 CAPSULE ORAL at 20:15

## 2018-09-17 RX ADMIN — Medication 0.5 MG: at 23:39

## 2018-09-17 RX ADMIN — ALTEPLASE 1 MG/HR: KIT at 13:55

## 2018-09-17 RX ADMIN — HEPARIN SODIUM 1300 UNITS/HR: 10000 INJECTION, SOLUTION INTRAVENOUS at 07:32

## 2018-09-17 RX ADMIN — HYDROCODONE BITARTRATE AND ACETAMINOPHEN 2 TABLET: 5; 325 TABLET ORAL at 20:17

## 2018-09-17 RX ADMIN — CLINDAMYCIN HYDROCHLORIDE 300 MG: 300 CAPSULE ORAL at 14:31

## 2018-09-17 RX ADMIN — CLINDAMYCIN HYDROCHLORIDE 300 MG: 300 CAPSULE ORAL at 08:13

## 2018-09-17 RX ADMIN — SODIUM CHLORIDE, PRESERVATIVE FREE: 5 INJECTION INTRAVENOUS at 14:33

## 2018-09-17 RX ADMIN — HEPARIN SODIUM 10000 UNITS: 10000 INJECTION, SOLUTION INTRAVENOUS; SUBCUTANEOUS at 13:28

## 2018-09-17 RX ADMIN — HYDROCODONE BITARTRATE AND ACETAMINOPHEN 1 TABLET: 5; 325 TABLET ORAL at 15:45

## 2018-09-17 RX ADMIN — LIDOCAINE HYDROCHLORIDE 3 ML: 10 INJECTION, SOLUTION INFILTRATION; PERINEURAL at 13:27

## 2018-09-17 RX ADMIN — POTASSIUM CHLORIDE 20 MEQ: 1500 TABLET, EXTENDED RELEASE ORAL at 00:13

## 2018-09-17 RX ADMIN — HYDROCODONE BITARTRATE AND ACETAMINOPHEN 1 TABLET: 5; 325 TABLET ORAL at 07:31

## 2018-09-17 RX ADMIN — ALTEPLASE 6 MG: 2.2 INJECTION, POWDER, LYOPHILIZED, FOR SOLUTION INTRAVENOUS at 13:53

## 2018-09-17 RX ADMIN — ALTEPLASE 1 MG/HR: KIT at 18:44

## 2018-09-17 RX ADMIN — Medication 0.3 MG: at 15:45

## 2018-09-17 ASSESSMENT — ACTIVITIES OF DAILY LIVING (ADL)
ADLS_ACUITY_SCORE: 13

## 2018-09-17 ASSESSMENT — PAIN DESCRIPTION - DESCRIPTORS: DESCRIPTORS: CONSTANT

## 2018-09-17 NOTE — PLAN OF CARE
Problem: Patient Care Overview  Goal: Plan of Care/Patient Progress Review  Outcome: Improving  Pt A&Ox4. VSS. On RA. Good appetite and oral intake. Pt eating softer foods d/t recent wisdom teeth extractions. Some generalized jaw pain, tolerable with PRN Norco and ice packs. Heparin running at 14mL/hr. CMS intact in RUE with improvement on pain, mobility, and strength. Pt still reports weakness in . Anxious about hospital setting and needles. Independent in room. Calls as needed.

## 2018-09-17 NOTE — PROGRESS NOTES
[facility]    Hospitalist Progress Note :     Cumulative Summary: Rickie Lagos is a27 year old male with no significant past medical history with recent wisdom tooth extraction last Thursday who was admitted on 9/16/2018 with acute right subclavian and axillary DVT.  He presented to the emergency room with right arm pain and tightening.  Patient was admitted for further evaluation and management.  Patient was a started on heparin infusion And vascular surgery and medicine were consulted.    Assessment & Plan     Active Problems:  Acute occlusive right subclavian and axillary DVT: Initially was thought to be secondary to catheter induced as IV was placed for his recent dental extraction.  But according to vascular surgery, spontaneous thrombosis of right axillary subclavian vein and right handed male with no prior symptoms including neurogenic TOS symptoms they do not think that his DVT is secondary to antecubital IV placement.  According to vascular surgery it is most likely due to thoracic outlet syndrome and he already had discussion regarding this at length with patient and family.  Patient underwent right arm venogram with planned placement of catheter across the thrombosed vessels into the superior vena cava and plan for initiating lytic therapy with Dr. Drake of intervention radiology.  Vascular surgery is also recommending sending patient home on oral anticoagulant with planned surgery for transaxillary first rib resection.  In approximately 2 weeks after successful lysis of thrombus.  Patient is seen post upper extremity venogram with lysis initiation, currently his main complaint is pain in his wisdom tooth.    --Continue to monitor patient closely.  --Appreciate vascular medicine, vascular surgery and intervention radiology help.  --Alteplase infusion as per vascular surgery recommendation  --Heparin infusion, managed by vascular surgery and medicine.  -- IV fluids normal saline at  75 cc/h postprocedure.  We will give patient 1 dose of 0.3 mg of IV Dilaudid, will increase his Norco to 1-2 tablets every 4 hours and will also order separate APAP for his pain control.  --Start patient on diet.    DVT Prophylaxis: on lysis  Code Status: Full Code    Disposition: Expected discharge in a day to two days once patient is stable once ok with vascular medicine     Nemo Montalvo MD, FACP  Text Page (7am - 6pm)      Interval History   Patient care was assumed , seen and examined , status post lysis , complaining of tooth pain.    -Data reviewed today: I reviewed all new labs and imaging results over the last 24 hours.    I personally reviewed no images or EKG's today.    Physical Exam   Temp: 97.3  F (36.3  C) Temp src: Oral BP: 117/69 Pulse: 83 Heart Rate: 64 Resp: 24 SpO2: 100 % O2 Device: None (Room air)    Vitals:    09/16/18 1521   Weight: 76.7 kg (169 lb)     Vital Signs with Ranges  Temp:  [97.3  F (36.3  C)-99.1  F (37.3  C)] 97.3  F (36.3  C)  Pulse:  [83-86] 83  Heart Rate:  [43-70] 64  Resp:  [16-24] 24  BP: (101-121)/(65-99) 117/69  SpO2:  [95 %-100 %] 100 %  I/O last 3 completed shifts:  In: 1125 [I.V.:125; IV Piggyback:1000]  Out: -     GENERAL: Alert , awake and oriented. NAD. Conversational, appropriate.   HEENT: Normocephalic. EOMI. No icterus or injection. Nares normal.   LUNGS: Clear to auscultation. No dyspnea at rest.   HEART: Regular rate. Extremities perfused.   ABDOMEN: Soft, nontender, and nondistended. Positive bowel sounds.   EXTREMITIES: No LE edema noted.   NEUROLOGIC: Moves extremities x4 on command. No acute focal neurologic abnormalities noted.     Medications     sodium chloride 0.9 %       sodium chloride 0.9 %       alteplase (ACTIVASE) 5 mg/500 mL infusion (LINE 1)       heparin 10,000 units in 1000 mL NS       heparin 100 unit/mL in 0.45% NaCl 1,200 Units/hr (09/17/18 0813)     heparin       - MEDICATION INSTRUCTIONS -       - MEDICATION INSTRUCTIONS -       sodium  chloride         clindamycin  300 mg Oral 4x Daily     fentaNYL (PF)         iopamidol  50 mL Intravenous Once     midazolam         sodium chloride (PF)  3 mL Intracatheter Q8H     sodium chloride (PF)  3 mL Intracatheter Q8H       Data     Recent Labs  Lab 09/17/18  0719 09/16/18  1720   WBC  --  10.5   HGB  --  14.8   MCV  --  87   PLT  --  280   NA  --  138   POTASSIUM 4.5 3.3*   CHLORIDE  --  101   CO2  --  25   BUN  --  9   CR  --  0.92   ANIONGAP  --  12   JC  --  9.8   GLC  --  85       Imaging:   Recent Results (from the past 24 hour(s))   US Upper Extremity Venous Duplex Right    Narrative    US UPPER EXTREMITY VENOUS DUPLEX RIGHT  9/16/2018 4:55 PM     HISTORY: Paresthesias, color change.    COMPARISON: None.    TECHNIQUE: Examination of the upper extremity veins was performed with  graded compression and 2-D ultrasound and color doppler spectral  waveform analysis.     FINDINGS:  There is occlusive thrombus in the right subclavian vein  through the axillary vein. The basilic and cephalic and brachial veins  are patent.  The veins in the forearm show no evidence of thrombosis.      Impression    IMPRESSION: Occlusive thrombus in the right subclavian and axillary  veins.        LAUREEN MENDIETA MD   Chest CT, IV contrast only - PE protocol    Narrative    CT CHEST PULMONARY EMBOLISM WITH CONTRAST  9/16/2018 6:22 PM     HISTORY:   Shortness of breath. Evaluate for pulmonary embolism.    TECHNIQUE:    Helical axial scans from lung apices through lung bases  with 65 mL Isovue-370 IV contrast. Radiation dose for this scan was  reduced using automated exposure control, adjustment of the mA and/or  kV according to patient size, or iterative reconstruction technique.    COMPARISON:    None.    FINDINGS:    There is no CT evidence for pulmonary embolism or other  acute vascular abnormality of the chest. The mediastinal and hilar  structures are unremarkable. The lungs are clear bilaterally.  Visualized upper  abdomen shows mild splenomegaly and no other  abnormality.      Impression    IMPRESSION:  1. No evidence for pulmonary embolism.  2. Mild splenomegaly.      JAMES CLAYTON MD   XR Chest 1 View    Narrative    XR CHEST 1 VW 9/17/2018 10:55 AM     HISTORY: hx of subclavian/axillary DVT; please eval for cervical rib;     COMPARISON: None      Impression    IMPRESSION: The cardiac silhouette and pulmonary vasculature are  normal. The lungs are clear. There is no evidence of a cervical rib.    DAQUAN LISA MD

## 2018-09-17 NOTE — PLAN OF CARE
Problem: Patient Care Overview  Goal: Plan of Care/Patient Progress Review  Outcome: Improving  Pt is A+OX4, VSS on RA. Heparin changed to 13cc/hr at 10a recheck this morning. One dose norco given for jaw pain. No pain or numbness/tingling in R arm. Patient's significant other present at bedside. Plan for vascular surgery consult today.

## 2018-09-17 NOTE — PHARMACY
Medication coverage check for Eliquis and Xarelto.  Eliquis: $86 monthly can be reduced to $10 with copay card.  Xarelto: $75 monthly can be reduced to $10 with copay card.  Kacey Eugene CphT  [facility] Pharmacy Liaison  Liaison Cell: [phone]

## 2018-09-17 NOTE — CONSULTS
VASCULAR SURGERY    Rickie Lagos was admitted yesterday for acute onset of right arm swelling.  This 27-year-old very healthy right-handed male had wisdom teeth surgery on 9/13/2018 and his oral surgeon's office.  They placed a standard right antecubital IV for the procedure that was removed postprocedure.  No complications with the surgery.    Patient has no specific symptoms of neurogenic thoracic outlet syndrome.  He uses his right arm for normal activities but is not a weight  or doing any activities on a routine basis above his head with his arm.  He was lifting groceries at this weekend but no other specific events.  Swelling was noted yesterday where he had swelling from the shoulder region down to his hand with dilated cutaneous veins.  Patient presented emergency department is been started on intravenous heparin.  Duplex ultrasound revealed occlusion of the right axillary and subclavian veins.  CTA revealed no evidence of pulmonary emboli and confirmed the thrombosis.    PMH: No chronic medications.            Underlying medical problems.            Does VAPE-denies illicit drugs.    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    FMH: No history of clotting disorders.  ROS: 12 point negative except for recent problem.    Exam: Very healthy appropriate young man.               Normal affect.  Wang.              HEENT= normal.  No supraclavicular masses or adenopathy.                Normal clavicle.             Chest= clear.   Cardiovascular=RR             Extremities= no obvious and antecubital veins were IV was present on the right.  Very mild right arm swelling.  No dilated cutaneous veins in the shoulder region.  +3 radial pulses bilaterally.  +3 distal pulses in his feet.    I reviewed the images of the CTA and duplex ultrasound with a right subclavian and axillary vein thrombus.    Chest x-ray was taken which reveals no cervical rib.  First rib is unremarkable  except for somewhat more narrow arc than normal which can be associated with TOS.      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  Do not feel that this is related to his antecubital IV.  I do feel this is most likely due to thoracic outlet syndrome and discussed this at length with multiple diagrams with the patient.  With his young age and the potential morbidity of long-term anticoagulation and chronic arm swelling I would recommend a more aggressive treatment.  We will perform a right arm venogram with planned placement of a catheter across the thrombosed vessels into the superior vena cava and initiate lytic therapy with Dr. Maldonado of interventional radiology who I reviewed this case with.  Discussed the risks and benefits of TPA.  There is certainly a chance of bleeding from his recent oral surgery though I do think this is very low risk and the merits of opening up his central venous circulation for a long-term benefit does outweigh this very small risk in the patient's aware of this.    If we do successfully lyse the thrombus and find some signs of extrinsic compression which I suspect we would plan to send the patient home on an oral anticoagulant with planned surgery in approximately 2 weeks.  I discussed the transaxillary first rib resection with his risks and benefits including need for drain and possible pleural tear and postoperative discomfort with the patient and answered all questions.    We spent 30 minutes on her bedside consultation today with over 50% counseling.  Patient will undergoes venogram this afternoon.      Kong Madden MD

## 2018-09-17 NOTE — CONSULTS
[facility]    Vascular Medicine Consultation     Date of Admission:  9/16/2018  Date of Consult (When I saw the patient): 09/17/18    Assessment & Plan   1. Acute occlusive right subclavian and axillary DVT, likely catheter induced    This is ultimately either catheter associated DVT form his recent dental extraction, or it is due to TOS. He has no recreational or vocational risk factors for TOS. He has had AC alone without venography / CD Lysis and serial imaging versus a more agressive approach with intial venography and CD lysis followed by arm maneuvers on completion venography followed by eventual first rib resection if dynamic compression of his RUE venous system is documented. He elects to pursue the latter. He will be taken to IR fro an attmept at CD lysis. Eventual AC will be with  NOAC.           Reason for Consult   Reason for consult: Asked by Dr. Bonilla to evaluate and help manage treatment plan and anticoagulation in this 27 year old male presenting with an acute DVT in the right axillary and subclavian veins 3 days after wisdom tooth extraction at which time he did have a right upper extremity IV.     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 He had his wisdom teeth extracted on 9/13/18. During this procedure, they had placed an IV in his right upper extremity in the antecubital fossa. He was sent home after the procedure and was recovering as he would expect. However, at around 12:30 pm on 9/16/18 he developed severe pain in his left arm near his shoulder and bicep. He also noted bulging veins and some discoloration in his right hand/fingers. He presented to Urgent Care and was advised to go right to the ER for further evaluation. In the ER an ultrasound was positive for occlusive thrombus in the right subclavian and axillary veins. A CT of the chest  was negative for PE. He was started on Iv heparin and admitted. He feels his symptoms have improved quite a bit overnight. He denies any personal history of blood clots as well as any family history that he knows of. He does not use his arm repetitively.     Past Medical History   Chronic neck/back pain for which he sees a Chiropractor every 2 weeks.     Past Surgical History   [place] tooth extraction 9/13/18      Prior to Admission Medications   Prior to Admission Medications   Prescriptions Last Dose Informant Patient Reported? Taking?   HYDROcodone-acetaminophen (NORCO) 5-325 MG per tablet 9/16/2018 at AM Self Yes Yes   Sig: Take 1 tablet by mouth Every 4 hours to every 6 hours as needed   clindamycin (CLEOCIN) 300 MG capsule 9/16/2018 at AM Self Yes Yes   Sig: Take 300 mg by mouth 4 times daily      Facility-Administered Medications: None     Allergies   Allergies   Allergen Reactions     Amoxicillin Rash       Social History   Rickie Lagos  reports that he has been smoking (Vapes). He has never used smokeless tobacco. He reports that he drinks alcohol. He reports that he does not use illicit drugs.    Family History   He does not know of any family history of bleeding/clotting disorders. However, he will ask his parents when they come to visit him.     Review of Systems   The 10 point Review of Systems is negative other than noted in the HPI or here.     Physical Exam   Temp: 97.3  F (36.3  C) Temp src: Oral BP: 114/77 Pulse: 83 Heart Rate: 43 Resp: 16 SpO2: 99 % O2 Device: None (Room air)    Vital Signs with Ranges  Temp:  [97.3  F (36.3  C)-99.1  F (37.3  C)] 97.3  F (36.3  C)  Pulse:  [83-86] 83  Heart Rate:  [43-60] 43  Resp:  [16] 16  BP: (101-120)/(65-99) 114/77  SpO2:  [95 %-100 %] 99 %  169 lbs 0 oz    Constitutional: awake, alert, cooperative, no apparent distress, and appears stated age  Eyes: Lids and lashes normal, pupils equal, round and reactive to light, extra ocular muscles intact, sclera  clear, conjunctiva normal  ENT: normocepalic, without obvious abnormality, oropharynx pink and moist  Hematologic / Lymphatic: no lymphadenopathy  Respiratory: No increased work of breathing, good air exchange, clear to auscultation bilaterally, no crackles or wheezing  Cardiovascular: regular rate and rhythm, normal S1 and S2 and no murmur noted  GI: Normal bowel sounds, soft, non-distended, non-tender  Skin: no redness, warmth, or swelling, no rashes and no lesions  Musculoskeletal: There is no redness, warmth, or swelling of the joints.  Full range of motion noted.  Motor strength is 5 out of 5 all extremities bilaterally.  Tone is normal.  Neurologic: Awake, alert, oriented to name, place and time.  Cranial nerves II-XII are grossly intact.  Motor is 5 out of 5 bilaterally.    Neuropsychiatric:  Normal affect, memory, insight.      Data   Most Recent 3 CBC's:  Recent Labs   Lab Test  09/16/18   1720   WBC  10.5   HGB  14.8   MCV  87   PLT  280     Most Recent 3 BMP's:  Recent Labs   Lab Test  09/17/18   0719  09/16/18   1720   NA   --   138   POTASSIUM  4.5  3.3*   CHLORIDE   --   101   CO2   --   25   BUN   --   9   CR   --   0.92   ANIONGAP   --   12   JC   --   9.8   GLC   --   85

## 2018-09-17 NOTE — PROGRESS NOTES
VASCULAR SURGERY    Patient is back from the venogram.  Infusion catheters in place and working well.    I reviewed the venogram.  This revealed complete occlusion of the right subclavian vein at the thoracic outlet.  This is able to be cannulated with the infusion catheter.      The location of the subclavian vein occlusion is consistent with vasogenic TOS.  I discussed this with the patient and reviewed the images.  Plan continue TPA with follow-up venogram tomorrow.      Kong Madden MD

## 2018-09-17 NOTE — CONSULTS
"[facility]    Vascular Surgery Consultation    Date of Admission:  9/16/2018    Assessment & Plan   Rickie Lagso is a 27 year old male who was admitted on 9/16/2018. I was asked to see the patient for R axillary/sublcavian DVT.    -discussed case with Dr. Maldonado (IR), who will tentatively plan on a RUE venogram with lytics today  -please keep NPO  -have also ordered CXR to eval for aberrant anatomy  -pending results of lytics, will discuss timing of possible right first rib resection (likely outpatient)  -have also ordered Vascular Medicine consult to eval  -overall care per primary  -following    The plan was discussed and agreed up on by the on call staff surgeon, Dr. Madden.    Active Problems:    DVT (deep vein thrombosis) in pregnancy (H)      Kwesi Bonilla MD    Code Status    Full Code    Reason for Consult   Reason for consult: I was asked by Dr. Cueto (Hospitalist) to evaluate this patient for R axillary/subclavian DVT.    Primary Care Physician   Physician No Ref-Primary    Chief Complaint   RUE swelling/pain    History is obtained from the patient    History of Present Illness   Rickie Lagos is a 27 year old male with no significant PMH who presents with with a right axillary/subclavian DVT.  Yesterday afternoon, patient notes acute onset of right upper extremity discoloration, venous engorgement, pain, and numbness.  Denies an inciting event or prior history.  Of note, patient helped unload some grocery bag today and underwent wisdom teeth removal on 9/13.  Patient also regularly sees a chiropractor; most recently two weeks ago.  Denies history of a hypercoagulable state.  Denies taking daily medications.  Endorses vape use.  Denies f/c/n/v/cp/sob.  Imaging notable for an \"occlusive thrombus in the right subclavian and axillary veins\".  CT negative for a PE.  Started on a heparin gtt with subjective improvement in symptoms.    Past Medical History   I have reviewed this " patient's medical history and updated it with pertinent information if needed.   History reviewed. No pertinent past medical history.    Past Surgical History   I have reviewed this patient's surgical history and updated it with pertinent information if needed.  History reviewed. No pertinent surgical history.    Prior to Admission Medications   Prior to Admission Medications   Prescriptions Last Dose Informant Patient Reported? Taking?   HYDROcodone-acetaminophen (NORCO) 5-325 MG per tablet 9/16/2018 at AM Self Yes Yes   Sig: Take 1 tablet by mouth Every 4 hours to every 6 hours as needed   clindamycin (CLEOCIN) 300 MG capsule 9/16/2018 at AM Self Yes Yes   Sig: Take 300 mg by mouth 4 times daily      Facility-Administered Medications: None     Allergies   Allergies   Allergen Reactions     Amoxicillin Rash       Social History   I have reviewed this patient's social history and updated it with pertinent information if needed. Rickie Lagos  reports that he has been smoking Other.  He has never used smokeless tobacco. He reports that he drinks alcohol. He reports that he does not use illicit drugs.    Family History   I have reviewed this patient's family history and updated it with pertinent information if needed.   No family history on file.    Review of Systems   The 10 point Review of Systems is negative other than noted in the HPI or here.     Physical Exam   Temp: 97.3  F (36.3  C) Temp src: Oral BP: 114/77 Pulse: 83 Heart Rate: 43 Resp: 16 SpO2: 99 % O2 Device: None (Room air)    Vital Signs with Ranges  Temp:  [97.3  F (36.3  C)-99.1  F (37.3  C)] 97.3  F (36.3  C)  Pulse:  [83-86] 83  Heart Rate:  [43-60] 43  Resp:  [16] 16  BP: (101-120)/(65-99) 114/77  SpO2:  [95 %-100 %] 99 %  169 lbs 0 oz    Constitutional:  NAD  HEENT: normocephalic  CV: RRR  Pulm: CTAB, nonlabored respirations  GI: soft, NT/ND  MSK: warm, dry, pink, 2+ L radial, 1+ R radial, 2+ DP/PT bilaterally  Neuro: A&O, motor/sensory  grossly intact  Psych: Appropriate    Data   Results for orders placed or performed during the hospital encounter of 09/16/18 (from the past 24 hour(s))   US Upper Extremity Venous Duplex Right    Narrative    US UPPER EXTREMITY VENOUS DUPLEX RIGHT  9/16/2018 4:55 PM     HISTORY: Paresthesias, color change.    COMPARISON: None.    TECHNIQUE: Examination of the upper extremity veins was performed with  graded compression and 2-D ultrasound and color doppler spectral  waveform analysis.     FINDINGS:  There is occlusive thrombus in the right subclavian vein  through the axillary vein. The basilic and cephalic and brachial veins  are patent.  The veins in the forearm show no evidence of thrombosis.      Impression    IMPRESSION: Occlusive thrombus in the right subclavian and axillary  veins.        LAUREEN MENDIETA MD   CBC with platelets differential   Result Value Ref Range    WBC 10.5 4.0 - 11.0 10e9/L    RBC Count 4.83 4.4 - 5.9 10e12/L    Hemoglobin 14.8 13.3 - 17.7 g/dL    Hematocrit 42.2 40.0 - 53.0 %    MCV 87 78 - 100 fl    MCH 30.6 26.5 - 33.0 pg    MCHC 35.1 31.5 - 36.5 g/dL    RDW 11.7 10.0 - 15.0 %    Platelet Count 280 150 - 450 10e9/L    Diff Method Automated Method     % Neutrophils 71.9 %    % Lymphocytes 19.7 %    % Monocytes 6.8 %    % Eosinophils 1.0 %    % Basophils 0.4 %    % Immature Granulocytes 0.2 %    Nucleated RBCs 0 0 /100    Absolute Neutrophil 7.5 1.6 - 8.3 10e9/L    Absolute Lymphocytes 2.1 0.8 - 5.3 10e9/L    Absolute Monocytes 0.7 0.0 - 1.3 10e9/L    Absolute Eosinophils 0.1 0.0 - 0.7 10e9/L    Absolute Basophils 0.0 0.0 - 0.2 10e9/L    Abs Immature Granulocytes 0.0 0 - 0.4 10e9/L    Absolute Nucleated RBC 0.0    Basic metabolic panel   Result Value Ref Range    Sodium 138 133 - 144 mmol/L    Potassium 3.3 (L) 3.4 - 5.3 mmol/L    Chloride 101 94 - 109 mmol/L    Carbon Dioxide 25 20 - 32 mmol/L    Anion Gap 12 3 - 14 mmol/L    Glucose 85 70 - 99 mg/dL    Urea Nitrogen 9 7 - 30 mg/dL     Creatinine 0.92 0.66 - 1.25 mg/dL    GFR Estimate >90 >60 mL/min/1.7m2    GFR Estimate If Black >90 >60 mL/min/1.7m2    Calcium 9.8 8.5 - 10.1 mg/dL   D dimer quantitative   Result Value Ref Range    D Dimer 0.9 (H) 0.0 - 0.50 ug/ml FEU   Chest CT, IV contrast only - PE protocol    Narrative    CT CHEST PULMONARY EMBOLISM WITH CONTRAST  9/16/2018 6:22 PM     HISTORY:   Shortness of breath. Evaluate for pulmonary embolism.    TECHNIQUE:    Helical axial scans from lung apices through lung bases  with 65 mL Isovue-370 IV contrast. Radiation dose for this scan was  reduced using automated exposure control, adjustment of the mA and/or  kV according to patient size, or iterative reconstruction technique.    COMPARISON:    None.    FINDINGS:    There is no CT evidence for pulmonary embolism or other  acute vascular abnormality of the chest. The mediastinal and hilar  structures are unremarkable. The lungs are clear bilaterally.  Visualized upper abdomen shows mild splenomegaly and no other  abnormality.      Impression    IMPRESSION:  1. No evidence for pulmonary embolism.  2. Mild splenomegaly.      JAMES CLAYTON MD   Heparin 10a Level   Result Value Ref Range    Heparin 10A Level 0.78 IU/mL   Potassium   Result Value Ref Range    Potassium 4.5 3.4 - 5.3 mmol/L   Heparin 10a Level   Result Value Ref Range    Heparin 10A Level 1.00 IU/mL               STAFF:  As above.  See my EPIC consult also.       Kong Madden MD

## 2018-09-17 NOTE — PROGRESS NOTES
.RECEIVING UNIT ED HANDOFF REVIEW    ED Nurse Handoff Report was reviewed by: Luther Light on September 16, 2018 at 7:35 PM     Room 632-2 ready for pt.

## 2018-09-17 NOTE — IR NOTE
Interventional Radiology Intra-procedural Nursing Note    Patient Name: Rickie Lagos  Medical Record Number: [id number]  Today's Date: September 17, 2018    Start Time: 1313  End of procedure time: 1357  Procedure: UE venogram with lysis initiation.   Report given to: JENNIFER Tavera on st [address]   Time pt departs:  1410  :     Other Notes: Right arm with 6F sheath and infusion catheter in place. TPA to infusion catheter and heparin to sheath infusing. Verified by both RNs. VSS. Pt remains on RA. Right arm is CDI, measures at 30cm on bicep. Immobility board in place. No c/o pain at this time. No further questions from RN or pt.     AUGUSTA BUSTOS

## 2018-09-17 NOTE — PROCEDURES
RADIOLOGY POST PROCEDURE NOTE w/ SEDATION  Patient name: Rickie Lagos  MRN: [id number]  : 1990    Pre-procedure diagnosis: Subclavian vein thrombus  Post-procedure diagnosis: Same    Procedure Date/Time: 2018  2:04 PM  Procedure:    RUE venogram   Central venogram   Initiation chemical thrombolysis  Estimated blood loss: None  Specimen(s) collected with description: none    I determined this patient to be an appropriate candidate for the planned sedation and procedure and reassessed the patient IMMEDIATELY PRIOR to sedation and procedure.     The patient tolerated the procedure well with no immediate complications.  Significant findings:none    See imaging dictation for procedural details.    Provider name: Jey Maldonado  Assistant(s):None

## 2018-09-17 NOTE — H&P
"[facility]    History and Physical  Hospitalist       Date of Admission:  9/16/2018    Assessment & Plan   Rickie Lagos is a 27 year old male with no known past medical history but recent widsom tooth extraction who presents with right arm pain and swelling, found to have DVT.    Acute right subclavian and axillary DVT:   Presents with right arm pain, swelling, and reddish/purple discoloration with US demonstrating DVT as noted.  No clear provocation, no immediate family history of clotting disorder (though possibly in more distant relatives).    -will ask for vascular surgery assessment as ongoing mild discomfort and swelling  -continue heparin gtt, briefly discussed coumadin vs NOACs    Hypokalemia:  Admission K 3.3.  -replace per protocol    Recent wisdom tooth extraction 9/13/18:  -continue PTA clindamycin and prn hydrocodone  -monitor for bleeding on heparin gtt    Nicotine dependence:    -nicotine gum prn    DVT Prophylaxis: heparin gtt  Code Status: Full Code    Disposition: Expected discharge in 2-3 days once symptoms improved, anticoagulation plan in place.    Ezio Cueto    Primary Care Physician   Physician No Ref-Primary    Chief Complaint   Right arm pain    History is obtained from the patient and discussion with ED provider    History of Present Illness   Rickie Lagos is a 27 year old male who presents with right arm pain.  Patient reports he had sudden onset of pain in his right axillary and pectoral region he describes as \"tightening\".  This was associated with reddish/purple discoloration of his arm and bulging veins in addition to mild swelling.  He denies any shortness of breath, chest pain or pressure or other symptoms on review of systems.  His presenting have improved at time of my interview, currently reporting only mild tightness of the right arm and lateral pectoral region.  Presented to the emergency room where ultrasound showed occlusive thrombus in the " subclavian and axillary veins.  He has no personal history of clotting disorder.  There are no immediate family members with clotting disorder, though his mother reports her father experienced a clot and she believes her paternal grandfather  of a clot.  The specifics  of these events are unknown.  He had recent wisdom tooth extraction but otherwise denies any immobilization.    Past Medical History    No known medical history.    Past Surgical History   [place]nut tooth extraction  Hernia repair as an infant    Prior to Admission Medications   Prior to Admission Medications   Prescriptions Last Dose Informant Patient Reported? Taking?   HYDROcodone-acetaminophen (NORCO) 5-325 MG per tablet 2018 at AM Self Yes Yes   Sig: Take 1 tablet by mouth Every 4 hours to every 6 hours as needed   clindamycin (CLEOCIN) 300 MG capsule 2018 at AM Self Yes Yes   Sig: Take 300 mg by mouth 4 times daily      Facility-Administered Medications: None     Allergies   Allergies   Allergen Reactions     Amoxicillin Rash       Social History   I have personally reviewed the social history with the patient showing rare alcohol use, no illicit drug use.  He is a former smoker  (1 ppd) and currently vapes.    Family History   Maternal grandfather and great-grandfather with possible clotting disorder.    Review of Systems   The 10 point Review of Systems is negative other than noted in the HPI or here.     Physical Exam   Temp: 98  F (36.7  C) Temp src: Oral BP: 114/65 Pulse: 83   Resp: 16 SpO2: 96 % O2 Device: None (Room air)    Vital Signs with Ranges  Temp:  [98  F (36.7  C)-99.1  F (37.3  C)] 98  F (36.7  C)  Pulse:  [83-86] 83  Resp:  [16] 16  BP: (101-120)/(65-99) 114/65  SpO2:  [95 %-100 %] 96 %  169 lbs 0 oz    Constitutional: well developed, well nourished male in no acute distress  Eyes: pupils equal, round and reactive to light, no icterus  HEENT: head normocephalic, atraumatic, mucous membranes moist, no cervical  lymphadenopathy  Respiratory: lungs clear to auscultation bilaterally, no crackles or wheezes, no tachypnea  Cardiovascular: regular rate and rhythm, normal S1/S2, no murmur, rubs or gallops  GI: abdomen soft, non-tender, non-distended, normal bowel sounds, no hepatosplenomegaly  Lymph/Hematologic: No peripheral edema  Skin: No rash or bruising, right upper extremity appearing mildly erythematous  Musculoskeletal: Extremities warm and well perfused  Neurologic: alert and appropriate, cranial nerves grossly intact, gross motor movements intact,  Psychiatric: normal affect    Data   Data reviewed today:  I personally reviewed no images or EKG's today.    Recent Labs  Lab 09/16/18  1720   WBC 10.5   HGB 14.8   MCV 87         POTASSIUM 3.3*   CHLORIDE 101   CO2 25   BUN 9   CR 0.92   ANIONGAP 12   JC 9.8   GLC 85       Recent Results (from the past 24 hour(s))   US Upper Extremity Venous Duplex Right    Narrative    US UPPER EXTREMITY VENOUS DUPLEX RIGHT  9/16/2018 4:55 PM     HISTORY: Paresthesias, color change.    COMPARISON: None.    TECHNIQUE: Examination of the upper extremity veins was performed with  graded compression and 2-D ultrasound and color doppler spectral  waveform analysis.     FINDINGS:  There is occlusive thrombus in the right subclavian vein  through the axillary vein. The basilic and cephalic and brachial veins  are patent.  The veins in the forearm show no evidence of thrombosis.      Impression    IMPRESSION: Occlusive thrombus in the right subclavian and axillary  veins.        LAUREEN MENDIETA MD   Chest CT, IV contrast only - PE protocol    Narrative    CT CHEST PULMONARY EMBOLISM WITH CONTRAST  9/16/2018 6:22 PM     HISTORY:   Shortness of breath. Evaluate for pulmonary embolism.    TECHNIQUE:    Helical axial scans from lung apices through lung bases  with 65 mL Isovue-370 IV contrast. Radiation dose for this scan was  reduced using automated exposure control, adjustment of the mA  and/or  kV according to patient size, or iterative reconstruction technique.    COMPARISON:    None.    FINDINGS:    There is no CT evidence for pulmonary embolism or other  acute vascular abnormality of the chest. The mediastinal and hilar  structures are unremarkable. The lungs are clear bilaterally.  Visualized upper abdomen shows mild splenomegaly and no other  abnormality.      Impression    IMPRESSION:  1. No evidence for pulmonary embolism.  2. Mild splenomegaly.

## 2018-09-17 NOTE — PHARMACY-ADMISSION MEDICATION HISTORY
Admission medication history interview status for the 9/16/2018  admission is complete. See EPIC admission navigator for prior to admission medications     Medication history source reliability:Good    Actions taken by pharmacist (provider contacted, etc):  PTA list verified with patient's pill bottle     Additional medication history information not noted on PTA med list :  -Patient reported he took 1 tablet of Norco 5/325mg tablet this morning (9/16/18)  -Clindamycin antibiotic treatment: Patient reported he finished clindamycin 4+3+3+1 capsules since 9/13/18 as below:   9/13/18, 4 capsules   9/14/18, 3 capsules  9/15/18, 3 capsules  9/16/18, 1 capsule    -Verified patient's allergy with the parents. Mom stated that patient had a rash outbreak (over the trunk) when taking amoxicillin when he was a child (can't confirm age, but mom stated it was when patient only can talked back). Since then, mom reported patient never took any antibiotic within the penicillin family. Can not confirm if there is any cross class allergic reaction.    Medication reconciliation/reorder completed by provider prior to medication history? No    Time spent in this activity:  10 minutes    Prior to Admission medications    Medication Sig Last Dose Taking? Auth Provider   clindamycin (CLEOCIN) 300 MG capsule Take 300 mg by mouth 4 times daily 9/16/2018 at AM Yes Unknown, Entered By History   HYDROcodone-acetaminophen (NORCO) 5-325 MG per tablet Take 1 tablet by mouth Every 4 hours to every 6 hours as needed 9/16/2018 at AM Yes Unknown, Entered By History     Keila Fox, Pharm.D IV Student

## 2018-09-18 ENCOUNTER — APPOINTMENT (OUTPATIENT)
Dept: INTERVENTIONAL RADIOLOGY/VASCULAR | Facility: CLINIC | Age: 28
DRG: 272 | End: 2018-09-18
Attending: RADIOLOGY
Payer: COMMERCIAL

## 2018-09-18 LAB
BASOPHILS # BLD AUTO: 0 10E9/L (ref 0–0.2)
BASOPHILS NFR BLD AUTO: 0.7 %
CREAT SERPL-MCNC: 0.82 MG/DL (ref 0.66–1.25)
DIFFERENTIAL METHOD BLD: NORMAL
EOSINOPHIL # BLD AUTO: 0.2 10E9/L (ref 0–0.7)
EOSINOPHIL NFR BLD AUTO: 2.8 %
ERYTHROCYTE [DISTWIDTH] IN BLOOD BY AUTOMATED COUNT: 12 % (ref 10–15)
GFR SERPL CREATININE-BSD FRML MDRD: >90 ML/MIN/1.7M2
HCT VFR BLD AUTO: 42.5 % (ref 40–53)
HGB BLD-MCNC: 14.4 G/DL (ref 13.3–17.7)
IMM GRANULOCYTES # BLD: 0 10E9/L (ref 0–0.4)
IMM GRANULOCYTES NFR BLD: 0.2 %
LMWH PPP CHRO-ACNC: <0.1 IU/ML
LYMPHOCYTES # BLD AUTO: 2 10E9/L (ref 0.8–5.3)
LYMPHOCYTES NFR BLD AUTO: 32.7 %
MCH RBC QN AUTO: 30.2 PG (ref 26.5–33)
MCHC RBC AUTO-ENTMCNC: 33.9 G/DL (ref 31.5–36.5)
MCV RBC AUTO: 89 FL (ref 78–100)
MONOCYTES # BLD AUTO: 0.6 10E9/L (ref 0–1.3)
MONOCYTES NFR BLD AUTO: 9.8 %
NEUTROPHILS # BLD AUTO: 3.3 10E9/L (ref 1.6–8.3)
NEUTROPHILS NFR BLD AUTO: 53.8 %
NRBC # BLD AUTO: 0 10*3/UL
NRBC BLD AUTO-RTO: 0 /100
PLATELET # BLD AUTO: 207 10E9/L (ref 150–450)
RBC # BLD AUTO: 4.77 10E12/L (ref 4.4–5.9)
WBC # BLD AUTO: 6.1 10E9/L (ref 4–11)

## 2018-09-18 PROCEDURE — 25000128 H RX IP 250 OP 636: Performed by: RADIOLOGY

## 2018-09-18 PROCEDURE — 27210804 ZZH SHEATH CR3

## 2018-09-18 PROCEDURE — 25000132 ZZH RX MED GY IP 250 OP 250 PS 637: Performed by: SURGERY

## 2018-09-18 PROCEDURE — C1769 GUIDE WIRE: HCPCS

## 2018-09-18 PROCEDURE — 27210906 ZZH KIT CR8

## 2018-09-18 PROCEDURE — 82565 ASSAY OF CREATININE: CPT | Performed by: RADIOLOGY

## 2018-09-18 PROCEDURE — 99231 SBSQ HOSP IP/OBS SF/LOW 25: CPT | Performed by: SURGERY

## 2018-09-18 PROCEDURE — 25500064 ZZH RX 255 OP 636: Performed by: RADIOLOGY

## 2018-09-18 PROCEDURE — 36415 COLL VENOUS BLD VENIPUNCTURE: CPT | Performed by: RADIOLOGY

## 2018-09-18 PROCEDURE — 27210845 ZZH DEVICE INFLATION CR5

## 2018-09-18 PROCEDURE — 27210896 ZZH CATH CR9

## 2018-09-18 PROCEDURE — 99231 SBSQ HOSP IP/OBS SF/LOW 25: CPT | Performed by: INTERNAL MEDICINE

## 2018-09-18 PROCEDURE — 27210742 ZZH CATH CR1

## 2018-09-18 PROCEDURE — 99233 SBSQ HOSP IP/OBS HIGH 50: CPT | Performed by: INTERNAL MEDICINE

## 2018-09-18 PROCEDURE — C1725 CATH, TRANSLUMIN NON-LASER: HCPCS

## 2018-09-18 PROCEDURE — 85025 COMPLETE CBC W/AUTO DIFF WBC: CPT | Performed by: RADIOLOGY

## 2018-09-18 PROCEDURE — 27210805 ZZH SHEATH CR4

## 2018-09-18 PROCEDURE — 85520 HEPARIN ASSAY: CPT | Performed by: RADIOLOGY

## 2018-09-18 PROCEDURE — 25000125 ZZHC RX 250: Performed by: RADIOLOGY

## 2018-09-18 PROCEDURE — 05753ZZ DILATION OF RIGHT SUBCLAVIAN VEIN, PERCUTANEOUS APPROACH: ICD-10-PCS | Performed by: RADIOLOGY

## 2018-09-18 PROCEDURE — 99153 MOD SED SAME PHYS/QHP EA: CPT

## 2018-09-18 PROCEDURE — 40000886 ZZH STATISTIC STEP DOWN HRS DAY

## 2018-09-18 PROCEDURE — 25000132 ZZH RX MED GY IP 250 OP 250 PS 637: Performed by: HOSPITALIST

## 2018-09-18 PROCEDURE — 25000132 ZZH RX MED GY IP 250 OP 250 PS 637: Performed by: INTERNAL MEDICINE

## 2018-09-18 PROCEDURE — 12000007 ZZH R&B INTERMEDIATE

## 2018-09-18 RX ORDER — FLUMAZENIL 0.1 MG/ML
0.2 INJECTION, SOLUTION INTRAVENOUS
Status: DISCONTINUED | OUTPATIENT
Start: 2018-09-18 | End: 2018-09-18 | Stop reason: HOSPADM

## 2018-09-18 RX ORDER — NALOXONE HYDROCHLORIDE 0.4 MG/ML
.1-.4 INJECTION, SOLUTION INTRAMUSCULAR; INTRAVENOUS; SUBCUTANEOUS
Status: DISCONTINUED | OUTPATIENT
Start: 2018-09-18 | End: 2018-09-18 | Stop reason: HOSPADM

## 2018-09-18 RX ORDER — LIDOCAINE HYDROCHLORIDE 10 MG/ML
1-30 INJECTION, SOLUTION EPIDURAL; INFILTRATION; INTRACAUDAL; PERINEURAL
Status: COMPLETED | OUTPATIENT
Start: 2018-09-18 | End: 2018-09-18

## 2018-09-18 RX ORDER — FENTANYL CITRATE 50 UG/ML
INJECTION, SOLUTION INTRAMUSCULAR; INTRAVENOUS
Status: DISCONTINUED
Start: 2018-09-18 | End: 2018-09-18 | Stop reason: HOSPADM

## 2018-09-18 RX ORDER — LIDOCAINE HYDROCHLORIDE 10 MG/ML
INJECTION, SOLUTION INFILTRATION; PERINEURAL
Status: DISCONTINUED
Start: 2018-09-18 | End: 2018-09-18 | Stop reason: HOSPADM

## 2018-09-18 RX ORDER — DIAZEPAM 2 MG
2-4 TABLET ORAL EVERY 8 HOURS PRN
Status: DISCONTINUED | OUTPATIENT
Start: 2018-09-18 | End: 2018-09-19 | Stop reason: HOSPADM

## 2018-09-18 RX ORDER — FENTANYL CITRATE 50 UG/ML
25-50 INJECTION, SOLUTION INTRAMUSCULAR; INTRAVENOUS EVERY 5 MIN PRN
Status: DISCONTINUED | OUTPATIENT
Start: 2018-09-18 | End: 2018-09-18 | Stop reason: HOSPADM

## 2018-09-18 RX ORDER — DIAZEPAM 5 MG
5 TABLET ORAL EVERY 8 HOURS PRN
Status: DISCONTINUED | OUTPATIENT
Start: 2018-09-18 | End: 2018-09-18

## 2018-09-18 RX ORDER — IOPAMIDOL 612 MG/ML
50 INJECTION, SOLUTION INTRAVASCULAR ONCE
Status: COMPLETED | OUTPATIENT
Start: 2018-09-18 | End: 2018-09-18

## 2018-09-18 RX ADMIN — HYDROCODONE BITARTRATE AND ACETAMINOPHEN 2 TABLET: 5; 325 TABLET ORAL at 21:26

## 2018-09-18 RX ADMIN — HYDROCODONE BITARTRATE AND ACETAMINOPHEN 2 TABLET: 5; 325 TABLET ORAL at 12:35

## 2018-09-18 RX ADMIN — HYDROCODONE BITARTRATE AND ACETAMINOPHEN 2 TABLET: 5; 325 TABLET ORAL at 06:08

## 2018-09-18 RX ADMIN — CLINDAMYCIN HYDROCHLORIDE 300 MG: 300 CAPSULE ORAL at 22:14

## 2018-09-18 RX ADMIN — MIDAZOLAM HYDROCHLORIDE 1 MG: 1 INJECTION, SOLUTION INTRAMUSCULAR; INTRAVENOUS at 15:25

## 2018-09-18 RX ADMIN — ALTEPLASE 1 MG/HR: KIT at 11:10

## 2018-09-18 RX ADMIN — IOPAMIDOL 30 ML: 612 INJECTION, SOLUTION INTRAVENOUS at 16:18

## 2018-09-18 RX ADMIN — HYDROCODONE BITARTRATE AND ACETAMINOPHEN 2 TABLET: 5; 325 TABLET ORAL at 16:59

## 2018-09-18 RX ADMIN — FENTANYL CITRATE 50 MCG: 50 INJECTION INTRAMUSCULAR; INTRAVENOUS at 15:37

## 2018-09-18 RX ADMIN — HYDROCODONE BITARTRATE AND ACETAMINOPHEN 2 TABLET: 5; 325 TABLET ORAL at 01:52

## 2018-09-18 RX ADMIN — FENTANYL CITRATE 50 MCG: 50 INJECTION INTRAMUSCULAR; INTRAVENOUS at 15:24

## 2018-09-18 RX ADMIN — DIAZEPAM 2 MG: 2 TABLET ORAL at 22:14

## 2018-09-18 RX ADMIN — SODIUM CHLORIDE, PRESERVATIVE FREE: 5 INJECTION INTRAVENOUS at 03:37

## 2018-09-18 RX ADMIN — SODIUM CHLORIDE, PRESERVATIVE FREE: 5 INJECTION INTRAVENOUS at 15:58

## 2018-09-18 RX ADMIN — ALTEPLASE 0.5 MG/HR: KIT at 18:26

## 2018-09-18 RX ADMIN — MIDAZOLAM HYDROCHLORIDE 1 MG: 1 INJECTION, SOLUTION INTRAMUSCULAR; INTRAVENOUS at 15:59

## 2018-09-18 RX ADMIN — CLINDAMYCIN HYDROCHLORIDE 300 MG: 300 CAPSULE ORAL at 18:16

## 2018-09-18 RX ADMIN — MIDAZOLAM HYDROCHLORIDE 1 MG: 1 INJECTION, SOLUTION INTRAMUSCULAR; INTRAVENOUS at 15:37

## 2018-09-18 RX ADMIN — MIDAZOLAM HYDROCHLORIDE 1 MG: 1 INJECTION, SOLUTION INTRAMUSCULAR; INTRAVENOUS at 15:45

## 2018-09-18 RX ADMIN — LIDOCAINE HYDROCHLORIDE 6 ML: 10 INJECTION, SOLUTION INFILTRATION; PERINEURAL at 15:45

## 2018-09-18 RX ADMIN — CLINDAMYCIN HYDROCHLORIDE 300 MG: 300 CAPSULE ORAL at 12:35

## 2018-09-18 RX ADMIN — DIAZEPAM 2 MG: 2 TABLET ORAL at 12:35

## 2018-09-18 RX ADMIN — FENTANYL CITRATE 50 MCG: 50 INJECTION INTRAMUSCULAR; INTRAVENOUS at 15:44

## 2018-09-18 RX ADMIN — CLINDAMYCIN HYDROCHLORIDE 300 MG: 300 CAPSULE ORAL at 08:33

## 2018-09-18 RX ADMIN — FENTANYL CITRATE 50 MCG: 50 INJECTION INTRAMUSCULAR; INTRAVENOUS at 15:58

## 2018-09-18 RX ADMIN — HEPARIN SODIUM 500 UNITS/HR: 10000 INJECTION, SOLUTION INTRAVENOUS at 16:17

## 2018-09-18 RX ADMIN — HEPARIN SODIUM 10000 UNITS: 10000 INJECTION, SOLUTION INTRAVENOUS; SUBCUTANEOUS at 15:24

## 2018-09-18 RX ADMIN — ALTEPLASE 1 MG/HR: KIT at 00:00

## 2018-09-18 RX ADMIN — ALTEPLASE 1 MG/HR: KIT at 05:45

## 2018-09-18 ASSESSMENT — ACTIVITIES OF DAILY LIVING (ADL)
ADLS_ACUITY_SCORE: 13
ADLS_ACUITY_SCORE: 10
ADLS_ACUITY_SCORE: 13

## 2018-09-18 ASSESSMENT — PAIN DESCRIPTION - DESCRIPTORS: DESCRIPTORS: SORE;ACHING

## 2018-09-18 NOTE — PROGRESS NOTES
Vascular Surgery Progress Note    S:Comfortable.  No arm swelling    O:   Vitals:  BP  Min: 114/77  Max: 146/89  Temp  Av.7  F (36.5  C)  Min: 97.3  F (36.3  C)  Max: 98  F (36.7  C)  Pulse  Av  Min: 55  Max: 55  I/O last 3 completed shifts:  In: 3683.9 [P.O.:800; I.V.:2883.9]  Out: -     Physical Exam:  Right sheath site=A   Minimal arm edema   CMS=normal                              Very anxious at times    Reviewed Venogram with patient/mother/father /girlfriend last PM along with Plan.      Assessment/Plan: Follow-up venogram later today.                                 Will give Valium prn anxiety.      Wm. Maryanne MD

## 2018-09-18 NOTE — PROGRESS NOTES
"Brief Progress Note    Chart Reviewed.  RUE venogram and lytics.  Stable overnight.    Vital signs:  Temp: 97.6  F (36.4  C) Temp src: Oral BP: 133/84 Pulse: 55 Heart Rate: 49 Resp: 14 SpO2: 100 % O2 Device: None (Room air)   Height: 193 cm (6' 4\") Weight: 76.7 kg (169 lb)  Estimated body mass index is 20.57 kg/(m^2) as calculated from the following:    Height as of this encounter: 1.93 m (6' 4\").    Weight as of this encounter: 76.7 kg (169 lb).    Constitutional:  NAD  HEENT: normocephalic  CV: RRR  Pulm: CTAB, nonlabored respirations  GI: soft, NT/ND  MSK: warm, dry, pink, 2+ L radial, 1+ R radial, 2+ DP/PT bilaterally  Neuro: A&O, motor/sensory grossly intact  Psych: Appropriate    Rickie Lagos is a 27 year old male who was admitted on 9/16/2018. I was asked to see the patient for R axillary/sublcavian DVT.  Venogram and lytics initiated by IR on 9/17    -plan to repeat venogram with IR today  -once stable, transition to PO anticoagulant per [facility]  -pending clinical findings, may required surgery in several weeks  -please refer to Dr. Madden's note from 9/17 for further details  -overall care per primary  -following  "

## 2018-09-18 NOTE — PROGRESS NOTES
[facility]  Vascular Medicine Progress Note          Assessment and Plan:   Active Problems:        RUE DVT(H)      Assessment: CD lysis intiated yesterday. Edema and chest wall tautness much better    Plan: lytic recheck today. TOS seen at clavicle yesterday on venography; eventual first rib resection; will discharge on NOAC               Interval History:   doing well; no cp, sob, n/v/d, or abd pain.              Review of Systems:   The 10 point Review of Systems is negative other than noted in the HPI             Medications:       clindamycin  300 mg Oral 4x Daily     docusate sodium  100 mg Oral BID     sodium chloride (PF)  3 mL Intracatheter Q8H                  Physical Exam:     Patient Vitals for the past 24 hrs:   BP Temp Temp src Pulse Heart Rate Resp SpO2   09/18/18 0400 - 97.6  F (36.4  C) Oral - - - 100 %   09/18/18 0237 - - - - - 14 -   09/18/18 0200 133/84 97.5  F (36.4  C) Oral - 49 14 100 %   09/18/18 0152 - - - - - 12 -   09/18/18 0000 136/84 98  F (36.7  C) Oral - 63 9 97 %   09/17/18 2354 - - Oral - - - -   09/17/18 2300 134/90 97.8  F (36.6  C) Oral - 65 9 98 %   09/17/18 2200 131/81 - - - 64 15 98 %   09/17/18 2100 (!) 134/97 - - - 51 10 98 %   09/17/18 2017 - - - - - 14 -   09/17/18 2000 139/84 - - - 60 12 98 %   09/17/18 1921 - 98  F (36.7  C) Oral - - 17 -   09/17/18 1900 128/77 - - - 66 (!) 6 98 %   09/17/18 1800 (!) 129/95 - - - 68 15 -   09/17/18 1700 132/90 - - - 54 17 -   09/17/18 1600 136/90 - - - 56 13 99 %   09/17/18 1535 - 97.7  F (36.5  C) Oral - - 18 -   09/17/18 1530 146/89 - - - 54 12 99 %   09/17/18 1500 (!) 142/94 - - - 55 8 97 %   09/17/18 1430 127/83 - - - 54 17 98 %   09/17/18 1418 118/78 - Oral 55 - 16 -   09/17/18 1400 - - - - 53 12 99 %   09/17/18 1355 119/72 - - - 54 14 100 %   09/17/18 1350 118/79 - - - 55 18 100 %   09/17/18 1345 115/68 - - - 54 18 100 %   09/17/18 1340 118/68 - - - 56 18 100 %   09/17/18 1335 119/82 - - - 62 20 100 %   09/17/18  1330 118/71 - - - 56 14 100 %   09/17/18 1325 122/74 - - - 61 14 100 %   09/17/18 1320 128/68 - - - 64 17 100 %   09/17/18 1315 117/69 - - - 64 24 100 %   09/17/18 1310 121/71 - - - 70 22 100 %   09/17/18 0742 114/77 97.3  F (36.3  C) Oral - 43 16 99 %     Wt Readings from Last 4 Encounters:   09/16/18 169 lb (76.7 kg)   09/16/18 169 lb 9 oz (76.9 kg)       Intake/Output Summary (Last 24 hours) at 09/18/18 0736  Last data filed at 09/18/18 0600   Gross per 24 hour   Intake           3683.9 ml   Output                0 ml   Net           3683.9 ml     Constitutional: normal  Eyes: normal  ENT: normal  Neck: Supple, symmetrical, trachea midline, no adenopathy, thyroid symmetric, not enlarged and no tenderness, skin normal.  Hematologic / Lymphatic: normal  Back: normal  Lungs: No increased work of breathing, good air exchange, clear to auscultation bilaterally, no crackles or wheezing.  Cardiovascular: Regular rate and rhythm, normal S1 and S2, no S3 or S4, and no murmur noted.  Chest / Breast: normal  Abdomen: normal  Musculoskeletal: No redness, warmth, or swelling of the joints.  Full range of motion noted.  Motor strength is 5 out of 5 all extremities bilaterally.  Tone is normal.  Neurologic: Awake, alert, oriented to name, place and time.  Cranial nerves II-XII are grossly intact.  Motor is 5 out of 5 bilaterally.  Cerebellar finger to nose, heel to shin intact.  Sensory is intact.  Babinski down going, Romberg negative, and gait is normal.  Neuropsychiatric: normal  Skin: normal           Data:     Results for orders placed or performed during the hospital encounter of 09/16/18 (from the past 24 hour(s))   [place] [facility] IP Consult: Patient to be seen: Routine - within 24 hours; R subclavian/axillary DVT; Consultant may enter orders: Yes    Narrative    Luther Moon MD     9/17/2018  1:23 PM  [facility]    Vascular Medicine Consultation     Date of Admission:   9/16/2018  Date of Consult (When I saw the patient): 09/17/18    Assessment & Plan   1. Acute occlusive right subclavian and axillary DVT, likely   catheter induced    This is ultimately either catheter associated DVT form his recent   dental extraction, or it is due to TOS. He has no recreational or   vocational risk factors for TOS. He has had AC alone without   venography / CD Lysis and serial imaging versus a more agressive   approach with intial venography and CD lysis followed by arm   maneuvers on completion venography followed by eventual first rib   resection if dynamic compression of his RUE venous system is   documented. He elects to pursue the latter. He will be taken to   IR fro an attmept at CD lysis. Eventual AC will be with  NOAC.           Reason for Consult   Reason for consult: Asked by Dr. Bonilla to evaluate and help manage   treatment plan and anticoagulation in this 27 year old male   presenting with an acute DVT in the right axillary and subclavian   veins 3 days after wisdom tooth extraction at which time he did   have a right upper extremity IV.     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 He had his wisdom teeth extracted   on 9/13/18. During this procedure, they had placed an IV in his   right upper extremity in the antecubital fossa. He was sent home   after the procedure and was recovering as he would expect.   However, at around 12:30 pm on 9/16/18 he developed severe pain   in his left arm near his shoulder and bicep. He also noted   bulging veins and some discoloration in his right hand/fingers.   He presented to Urgent Care and was advised to go right to the ER   for further evaluation. In the ER an ultrasound was positive for   occlusive thrombus in the right subclavian and axillary veins. A   CT of the chest was negative for PE. He was started on  Iv heparin   and admitted. He feels his symptoms have improved quite a bit   overnight. He denies any personal history of blood clots as well   as any family history that he knows of. He does not use his arm   repetitively.     Past Medical History   Chronic neck/back pain for which he sees a Chiropractor every 2   weeks.     Past Surgical History   [place] tooth extraction 9/13/18      Prior to Admission Medications   Prior to Admission Medications   Prescriptions Last Dose Informant Patient Reported? Taking?   HYDROcodone-acetaminophen (NORCO) 5-325 MG per tablet 9/16/2018   at AM Self Yes Yes   Sig: Take 1 tablet by mouth Every 4 hours to every 6 hours as   needed   clindamycin (CLEOCIN) 300 MG capsule 9/16/2018 at AM Self Yes Yes     Sig: Take 300 mg by mouth 4 times daily      Facility-Administered Medications: None     Allergies   Allergies   Allergen Reactions     Amoxicillin Rash       Social History   Rickie Lagos  reports that he has been smoking (Vapes). He   has never used smokeless tobacco. He reports that he drinks   alcohol. He reports that he does not use illicit drugs.    Family History   He does not know of any family history of bleeding/clotting   disorders. However, he will ask his parents when they come to   visit him.     Review of Systems   The 10 point Review of Systems is negative other than noted in   the HPI or here.     Physical Exam   Temp: 97.3  F (36.3  C) Temp src: Oral BP: 114/77 Pulse: 83 Heart   Rate: 43 Resp: 16 SpO2: 99 % O2 Device: None (Room air)    Vital Signs with Ranges  Temp:  [97.3  F (36.3  C)-99.1  F (37.3  C)] 97.3  F (36.3  C)  Pulse:  [83-86] 83  Heart Rate:  [43-60] 43  Resp:  [16] 16  BP: (101-120)/(65-99) 114/77  SpO2:  [95 %-100 %] 99 %  169 lbs 0 oz    Constitutional: awake, alert, cooperative, no apparent distress,   and appears stated age  Eyes: Lids and lashes normal, pupils equal, round and reactive to   light, extra ocular muscles intact, sclera clear,  conjunctiva   normal  ENT: normocepalic, without obvious abnormality, oropharynx pink   and moist  Hematologic / Lymphatic: no lymphadenopathy  Respiratory: No increased work of breathing, good air exchange,   clear to auscultation bilaterally, no crackles or wheezing  Cardiovascular: regular rate and rhythm, normal S1 and S2 and no   murmur noted  GI: Normal bowel sounds, soft, non-distended, non-tender  Skin: no redness, warmth, or swelling, no rashes and no lesions  Musculoskeletal: There is no redness, warmth, or swelling of the   joints.  Full range of motion noted.  Motor strength is 5 out of   5 all extremities bilaterally.  Tone is normal.  Neurologic: Awake, alert, oriented to name, place and time.    Cranial nerves II-XII are grossly intact.  Motor is 5 out of 5   bilaterally.    Neuropsychiatric:  Normal affect, memory, insight.      Data   Most Recent 3 CBC's:  Recent Labs   Lab Test  09/16/18   1720   WBC  10.5   HGB  14.8   MCV  87   PLT  280     Most Recent 3 BMP's:  Recent Labs   Lab Test  09/17/18   0719  09/16/18   1720   NA   --   138   POTASSIUM  4.5  3.3*   CHLORIDE   --   101   CO2   --   25   BUN   --   9   CR   --   0.92   ANIONGAP   --   12   JC   --   9.8   GLC   --   85            XR Chest 1 View    Narrative    XR CHEST 1 VW 9/17/2018 10:55 AM     HISTORY: hx of subclavian/axillary DVT; please eval for cervical rib;     COMPARISON: None      Impression    IMPRESSION: The cardiac silhouette and pulmonary vasculature are  normal. The lungs are clear. There is no evidence of a cervical rib.    DAQUAN LISA MD   IR Upper Extremity Venogram Right    Narrative    PROCEDURE:     Right upper extremity venogram  Right Central venogram  Initiation chemical thrombolysis.    DATE OF PROCEDURE:  9/17/2018 1:56 PM    OPERATORS:    Jey Maldonado MD    MEDICATIONS:  1% lidocaine SQ, 2.5 mg IV Versed, 125 mcg IV fentanyl, 6 mg IV TPA    CONTRAST:    12    REFERENCED AIR KERMA: 18 mGy  FLUOROSCOPY TIME:  4 minutes    ESTIMATED BLOOD LOSS:  Minimal    COMPLICATIONS:  None    PRE-PROCEDURE DIAGNOSIS: Right axillary and subclavian venous thrombus  POST-PROCEDURE DIAGNOSIS: Same    CLINICAL HISTORY/INDICATION:  27-year-old male with unprovoked right axillary and subclavian venous  thrombus presents for catheter directed thrombolysis.    PROCEDURE AND FINDINGS:    Following a discussion of the risks, benefits, indications and  alternatives to treatment, appropriate informed consent was obtained.   The patient was brought to the interventional radiology suite and  placed supine on the table. The patient's right arm was prepped and  draped using maximum sterile barrier technique for tunneled line  placement including: cap AND mask AND sterile gown AND sterile gloves  AND sterile full body drape AND hand hygiene AND skin preparation 2%  chlorhexidine for cutaneous antisepsis (or acceptable alternative  antiseptics).  A timeout was performed per hospital universal protocol  policy to ensure correct patient, site, and procedure to be performed.    Ultrasound was utilized to evaluate the veins of the right arm and a  permanent record of the image was obtained, which demonstrated the  basilic vein to be patent. Under direct ultrasound guidance, 1%  lidocaine was infiltrated and access was gained easily into the  basilic vein utilizing micropuncture technique.    A guide wire was then advanced into the vein. Exchange made for a 5  Ivorian micropuncture dilator. Right upper extremity venogram was  performed showing patent brachial and basilic veins. The axillary vein  is occluded. Exchange was then made for a 6 Ivorian vascular sheath  through which a 0.035 inch Glidewire and angled Berenstein catheter  were used in tandem to traverse the axillary occlusion. With the  catheter tip within the subclavian vein, digital subtraction  angiography was performed showing occlusive and nonocclusive thrombus  within the subclavian vein  extending into numerous sidebranches.  Contrast flows via collateral pathways into the SVC. The wire and  catheter were then used to traverse the occluded subclavian vein into  the SVC. With the catheter tip in the superior vena cava, digital  subtraction angiography was performed showing widely patent superior  vena cava without evidence of caval thrombus. The catheter was removed  over a wire and a 20 cm infusion length catheter was advanced over the  wire and positioned with the leading marker in the superior vena cava  and the trailing marker in the trailing axillary vein. 6 mg of TPA was  bolused through the infusion catheter. The sheath was sutured in place  with a 2-0 Ethilon suture and the infusion catheter secured.     Throughout the procedure, the patient was monitored by a radiology  nurse for cardiac rhythm which remained stable. The patient tolerated  the procedure well and left the interventional radiology suite in  stable condition.      Impression    IMPRESSION:     1.  Right upper extremity and central venogram showing occlusive and  nonocclusive thrombus present within the axillary and subclavian  veins. The superior vena cava is patent. Peripheral brachial and  basilic veins are patent.  2.  Initiation thrombolytic therapy via a 20 cm infusion length  catheter extending from the axillary vein to the SVC.    PLAN:  TPA will be administered at a rate of 1 mg per hour via the infusion  catheter  Heparin 500 units IV via the side arm of the vascular sheath.    JESSICA HIGH MD   CBC with platelets differential   Result Value Ref Range    WBC 6.1 4.0 - 11.0 10e9/L    RBC Count 4.77 4.4 - 5.9 10e12/L    Hemoglobin 14.4 13.3 - 17.7 g/dL    Hematocrit 42.5 40.0 - 53.0 %    MCV 89 78 - 100 fl    MCH 30.2 26.5 - 33.0 pg    MCHC 33.9 31.5 - 36.5 g/dL    RDW 12.0 10.0 - 15.0 %    Platelet Count 207 150 - 450 10e9/L    Diff Method Automated Method     % Neutrophils 53.8 %    % Lymphocytes 32.7 %    %  Monocytes 9.8 %    % Eosinophils 2.8 %    % Basophils 0.7 %    % Immature Granulocytes 0.2 %    Nucleated RBCs 0 0 /100    Absolute Neutrophil 3.3 1.6 - 8.3 10e9/L    Absolute Lymphocytes 2.0 0.8 - 5.3 10e9/L    Absolute Monocytes 0.6 0.0 - 1.3 10e9/L    Absolute Eosinophils 0.2 0.0 - 0.7 10e9/L    Absolute Basophils 0.0 0.0 - 0.2 10e9/L    Abs Immature Granulocytes 0.0 0 - 0.4 10e9/L    Absolute Nucleated RBC 0.0    Creatinine   Result Value Ref Range    Creatinine 0.82 0.66 - 1.25 mg/dL    GFR Estimate >90 >60 mL/min/1.7m2    GFR Estimate If Black >90 >60 mL/min/1.7m2   Heparin 10a Level   Result Value Ref Range    Heparin 10A Level <0.10 IU/mL

## 2018-09-18 NOTE — PLAN OF CARE
Problem: Patient Care Overview  Goal: Plan of Care/Patient Progress Review  Outcome: No Change  Arrived to floor from IR at 1430. VSS, afebrile. TPA infusing through RUE, site CDI. Right arm circumference remains at 30cm.  Pain now controlled with IV dilaudid. Ice packs to jaw and sitting up higher in bed helping with jaw pain. Tolerating regular/soft diet. Voiding adequately. Tele- SB/SR. Neuro's intact. Family at bedside. Call light within reach and able to make needs known.

## 2018-09-18 NOTE — PLAN OF CARE
Problem: Patient Care Overview  Goal: Plan of Care/Patient Progress Review  Outcome: No Change  Vital signs stable. Alert and oriented. Lung sounds clear. Bowel sounds active and audible, flatus present. TPA dressing clean dry and intact, Right arm circumference 29.5 cm. Pain controlled with NORCO x2 and IV dilaudid for breakthrough pain. Up with stand by to bathroom. Tolerating regular diet until. CMS intact, no numbness or tingling, pulses palpable. Neuros intact.

## 2018-09-18 NOTE — PROGRESS NOTES
[facility]    Hospitalist Progress Note :     Cumulative Summary: Rickie Lagos is a27 year old male with no significant past medical history with recent wisdom tooth extraction last Thursday who was admitted on 9/16/2018 with acute right subclavian and axillary DVT.  He presented to the emergency room with right arm pain and tightening.  Patient was admitted for further evaluation and management.  Patient was a started on heparin infusion And vascular surgery and medicine were consulted.    Assessment & Plan     Active Problems:  Acute occlusive right subclavian and axillary DVT: Initially was thought to be secondary to catheter induced as IV was placed for his recent dental extraction.  But according to vascular surgery, spontaneous thrombosis of right axillary subclavian vein and right handed male with no prior symptoms including neurogenic TOS symptoms they do not think that his DVT is secondary to antecubital IV placement.  According to vascular surgery it is most likely due to thoracic outlet syndrome and he already had discussion regarding this at length with patient and family.  Patient underwent right arm venogram with planned placement of catheter across the thrombosed vessels into the superior vena cava and plan for initiating lytic therapy with Dr. Drake of intervention radiology.  Vascular surgery is also recommending sending patient home on oral anticoagulant with planned surgery for transaxillary first rib resection.  In approximately 2 weeks after successful lysis of thrombus.  Patient underwent upper extremity venogram with lysis initiation, he is planned to undergo venogram this afternoon  Recent wisdom tooth extraction: on Thursday , pain is better this morning with the current pain med's, no active bleeding     --Continue to monitor patient closely.  --Appreciate vascular medicine, vascular surgery and intervention radiology help.  --Alteplase infusion as per vascular  surgery recommendation  --Heparin infusion, managed by vascular surgery and medicine.  -- IV fluids normal saline at 75 cc/h postprocedure.  -- continue current pain med's   -- Start patient on diet.    DVT Prophylaxis: on lysis  Code Status: Full Code    Disposition: Expected discharge once patient is stable and ok to be discharged from vascular medicine and surgery point of view     Nemo Montalvo MD, FACP  Text Page (7am - 6pm)      Interval History   Patient was seen and examined , status post lysis , tooth pain is much better this morning ,aware about plan for venogram this afternoon around 1 pm.    -Data reviewed today: I reviewed all new labs and imaging results over the last 24 hours.    I personally reviewed no images or EKG's today.    Physical Exam   Temp: 97.4  F (36.3  C) Temp src: Axillary BP: 127/75 Pulse: 55 Heart Rate: 50 Resp: 12 SpO2: 97 % O2 Device: None (Room air)    Vitals:    09/16/18 1521   Weight: 76.7 kg (169 lb)     Vital Signs with Ranges  Temp:  [97.4  F (36.3  C)-98  F (36.7  C)] 97.4  F (36.3  C)  Pulse:  [55] 55  Heart Rate:  [46-70] 50  Resp:  [6-24] 12  BP: (112-151)/() 127/75  SpO2:  [97 %-100 %] 97 %  I/O last 3 completed shifts:  In: 3683.9 [P.O.:800; I.V.:2883.9]  Out: -     GENERAL: Alert , awake and oriented. NAD. Conversational, appropriate.family present in the room.   HEENT: Normocephalic. EOMI. No icterus or injection. Nares normal.   LUNGS: Clear to auscultation. No dyspnea at rest.   HEART: Regular rate. Extremities perfused.   ABDOMEN: Soft, nontender, and nondistended. Positive bowel sounds.   EXTREMITIES: No LE edema noted.   NEUROLOGIC: Moves extremities x4 on command. No acute focal neurologic abnormalities noted.     Medications     alteplase (ACTIVASE) 5 mg/500 mL infusion (LINE 1) 1 mg/hr (09/18/18 0715)     heparin 500 Units/hr (09/18/18 0715)     - MEDICATION INSTRUCTIONS -       - MEDICATION INSTRUCTIONS -       sodium chloride 75 mL/hr at 09/18/18 4107        clindamycin  300 mg Oral 4x Daily     docusate sodium  100 mg Oral BID     sodium chloride (PF)  3 mL Intracatheter Q8H       Data     Recent Labs  Lab 09/18/18  0625 09/17/18  0719 09/16/18  1720   WBC 6.1  --  10.5   HGB 14.4  --  14.8   MCV 89  --  87     --  280   NA  --   --  138   POTASSIUM  --  4.5 3.3*   CHLORIDE  --   --  101   CO2  --   --  25   BUN  --   --  9   CR 0.82  --  0.92   ANIONGAP  --   --  12   JC  --   --  9.8   GLC  --   --  85       Imaging:   Recent Results (from the past 24 hour(s))   XR Chest 1 View    Narrative    XR CHEST 1 VW 9/17/2018 10:55 AM     HISTORY: hx of subclavian/axillary DVT; please eval for cervical rib;     COMPARISON: None      Impression    IMPRESSION: The cardiac silhouette and pulmonary vasculature are  normal. The lungs are clear. There is no evidence of a cervical rib.    DAQUAN LISA MD   IR Upper Extremity Venogram Right    Narrative    PROCEDURE:     Right upper extremity venogram  Right Central venogram  Initiation chemical thrombolysis.    DATE OF PROCEDURE:  9/17/2018 1:56 PM    OPERATORS:    Jey Maldonado MD    MEDICATIONS:  1% lidocaine SQ, 2.5 mg IV Versed, 125 mcg IV fentanyl, 6 mg IV TPA    CONTRAST:    12    REFERENCED AIR KERMA: 18 mGy  FLUOROSCOPY TIME: 4 minutes    ESTIMATED BLOOD LOSS:  Minimal    COMPLICATIONS:  None    PRE-PROCEDURE DIAGNOSIS: Right axillary and subclavian venous thrombus  POST-PROCEDURE DIAGNOSIS: Same    CLINICAL HISTORY/INDICATION:  27-year-old male with unprovoked right axillary and subclavian venous  thrombus presents for catheter directed thrombolysis.    PROCEDURE AND FINDINGS:    Following a discussion of the risks, benefits, indications and  alternatives to treatment, appropriate informed consent was obtained.   The patient was brought to the interventional radiology suite and  placed supine on the table. The patient's right arm was prepped and  draped using maximum sterile barrier technique for tunneled  line  placement including: cap AND mask AND sterile gown AND sterile gloves  AND sterile full body drape AND hand hygiene AND skin preparation 2%  chlorhexidine for cutaneous antisepsis (or acceptable alternative  antiseptics).  A timeout was performed per hospital universal protocol  policy to ensure correct patient, site, and procedure to be performed.    Ultrasound was utilized to evaluate the veins of the right arm and a  permanent record of the image was obtained, which demonstrated the  basilic vein to be patent. Under direct ultrasound guidance, 1%  lidocaine was infiltrated and access was gained easily into the  basilic vein utilizing micropuncture technique.    A guide wire was then advanced into the vein. Exchange made for a 5  Tajik micropuncture dilator. Right upper extremity venogram was  performed showing patent brachial and basilic veins. The axillary vein  is occluded. Exchange was then made for a 6 Tajik vascular sheath  through which a 0.035 inch Glidewire and angled Berenstein catheter  were used in tandem to traverse the axillary occlusion. With the  catheter tip within the subclavian vein, digital subtraction  angiography was performed showing occlusive and nonocclusive thrombus  within the subclavian vein extending into numerous sidebranches.  Contrast flows via collateral pathways into the SVC. The wire and  catheter were then used to traverse the occluded subclavian vein into  the SVC. With the catheter tip in the superior vena cava, digital  subtraction angiography was performed showing widely patent superior  vena cava without evidence of caval thrombus. The catheter was removed  over a wire and a 20 cm infusion length catheter was advanced over the  wire and positioned with the leading marker in the superior vena cava  and the trailing marker in the trailing axillary vein. 6 mg of TPA was  bolused through the infusion catheter. The sheath was sutured in place  with a 2-0 Ethilon suture  and the infusion catheter secured.     Throughout the procedure, the patient was monitored by a radiology  nurse for cardiac rhythm which remained stable. The patient tolerated  the procedure well and left the interventional radiology suite in  stable condition.      Impression    IMPRESSION:     1.  Right upper extremity and central venogram showing occlusive and  nonocclusive thrombus present within the axillary and subclavian  veins. The superior vena cava is patent. Peripheral brachial and  basilic veins are patent.  2.  Initiation thrombolytic therapy via a 20 cm infusion length  catheter extending from the axillary vein to the SVC.    PLAN:  TPA will be administered at a rate of 1 mg per hour via the infusion  catheter  Heparin 500 units IV via the side arm of the vascular sheath.    JESSICA HIGH MD

## 2018-09-19 ENCOUNTER — APPOINTMENT (OUTPATIENT)
Dept: INTERVENTIONAL RADIOLOGY/VASCULAR | Facility: CLINIC | Age: 28
DRG: 272 | End: 2018-09-19
Attending: RADIOLOGY
Payer: COMMERCIAL

## 2018-09-19 ENCOUNTER — HOSPITAL ENCOUNTER (INPATIENT)
Facility: CLINIC | Age: 28
Setting detail: SURGERY ADMIT
End: 2018-09-19
Attending: SURGERY | Admitting: SURGERY
Payer: COMMERCIAL

## 2018-09-19 VITALS
DIASTOLIC BLOOD PRESSURE: 82 MMHG | WEIGHT: 169 LBS | SYSTOLIC BLOOD PRESSURE: 135 MMHG | HEART RATE: 55 BPM | BODY MASS INDEX: 20.58 KG/M2 | TEMPERATURE: 98 F | OXYGEN SATURATION: 97 % | HEIGHT: 76 IN | RESPIRATION RATE: 16 BRPM

## 2018-09-19 LAB
BASOPHILS # BLD AUTO: 0 10E9/L (ref 0–0.2)
BASOPHILS NFR BLD AUTO: 0.4 %
CREAT SERPL-MCNC: 0.83 MG/DL (ref 0.66–1.25)
DIFFERENTIAL METHOD BLD: NORMAL
EOSINOPHIL # BLD AUTO: 0.2 10E9/L (ref 0–0.7)
EOSINOPHIL NFR BLD AUTO: 2.6 %
ERYTHROCYTE [DISTWIDTH] IN BLOOD BY AUTOMATED COUNT: 12 % (ref 10–15)
GFR SERPL CREATININE-BSD FRML MDRD: >90 ML/MIN/1.7M2
HCT VFR BLD AUTO: 41.7 % (ref 40–53)
HGB BLD-MCNC: 14.4 G/DL (ref 13.3–17.7)
IMM GRANULOCYTES # BLD: 0 10E9/L (ref 0–0.4)
IMM GRANULOCYTES NFR BLD: 0.1 %
LMWH PPP CHRO-ACNC: <0.1 IU/ML
LYMPHOCYTES # BLD AUTO: 1.3 10E9/L (ref 0.8–5.3)
LYMPHOCYTES NFR BLD AUTO: 19.2 %
MCH RBC QN AUTO: 30.7 PG (ref 26.5–33)
MCHC RBC AUTO-ENTMCNC: 34.5 G/DL (ref 31.5–36.5)
MCV RBC AUTO: 89 FL (ref 78–100)
MONOCYTES # BLD AUTO: 0.6 10E9/L (ref 0–1.3)
MONOCYTES NFR BLD AUTO: 8.6 %
NEUTROPHILS # BLD AUTO: 4.8 10E9/L (ref 1.6–8.3)
NEUTROPHILS NFR BLD AUTO: 69.1 %
NRBC # BLD AUTO: 0 10*3/UL
NRBC BLD AUTO-RTO: 0 /100
PLATELET # BLD AUTO: 194 10E9/L (ref 150–450)
RBC # BLD AUTO: 4.69 10E12/L (ref 4.4–5.9)
WBC # BLD AUTO: 7 10E9/L (ref 4–11)

## 2018-09-19 PROCEDURE — 99233 SBSQ HOSP IP/OBS HIGH 50: CPT | Mod: 25 | Performed by: INTERNAL MEDICINE

## 2018-09-19 PROCEDURE — 25000132 ZZH RX MED GY IP 250 OP 250 PS 637: Performed by: SURGERY

## 2018-09-19 PROCEDURE — 25000132 ZZH RX MED GY IP 250 OP 250 PS 637: Performed by: INTERNAL MEDICINE

## 2018-09-19 PROCEDURE — 85025 COMPLETE CBC W/AUTO DIFF WBC: CPT | Performed by: RADIOLOGY

## 2018-09-19 PROCEDURE — 25000132 ZZH RX MED GY IP 250 OP 250 PS 637: Performed by: PHYSICIAN ASSISTANT

## 2018-09-19 PROCEDURE — 27210905 ZZH KIT CR7

## 2018-09-19 PROCEDURE — 27210845 ZZH DEVICE INFLATION CR5

## 2018-09-19 PROCEDURE — 25000128 H RX IP 250 OP 636: Performed by: RADIOLOGY

## 2018-09-19 PROCEDURE — 25000132 ZZH RX MED GY IP 250 OP 250 PS 637: Performed by: HOSPITALIST

## 2018-09-19 PROCEDURE — 36415 COLL VENOUS BLD VENIPUNCTURE: CPT | Performed by: RADIOLOGY

## 2018-09-19 PROCEDURE — C1725 CATH, TRANSLUMIN NON-LASER: HCPCS

## 2018-09-19 PROCEDURE — C1769 GUIDE WIRE: HCPCS

## 2018-09-19 PROCEDURE — 37214 CESSJ THERAPY CATH REMOVAL: CPT

## 2018-09-19 PROCEDURE — 99238 HOSP IP/OBS DSCHRG MGMT 30/<: CPT | Performed by: INTERNAL MEDICINE

## 2018-09-19 PROCEDURE — 85520 HEPARIN ASSAY: CPT | Performed by: RADIOLOGY

## 2018-09-19 PROCEDURE — 25500064 ZZH RX 255 OP 636: Performed by: RADIOLOGY

## 2018-09-19 PROCEDURE — 82565 ASSAY OF CREATININE: CPT | Performed by: RADIOLOGY

## 2018-09-19 PROCEDURE — 99231 SBSQ HOSP IP/OBS SF/LOW 25: CPT | Performed by: SURGERY

## 2018-09-19 PROCEDURE — 05753ZZ DILATION OF RIGHT SUBCLAVIAN VEIN, PERCUTANEOUS APPROACH: ICD-10-PCS | Performed by: RADIOLOGY

## 2018-09-19 PROCEDURE — 05C53ZZ EXTIRPATION OF MATTER FROM RIGHT SUBCLAVIAN VEIN, PERCUTANEOUS APPROACH: ICD-10-PCS | Performed by: RADIOLOGY

## 2018-09-19 PROCEDURE — 25000125 ZZHC RX 250: Performed by: RADIOLOGY

## 2018-09-19 PROCEDURE — C1757 CATH, THROMBECTOMY/EMBOLECT: HCPCS

## 2018-09-19 RX ORDER — LIDOCAINE HYDROCHLORIDE 10 MG/ML
1-30 INJECTION, SOLUTION EPIDURAL; INFILTRATION; INTRACAUDAL; PERINEURAL
Status: COMPLETED | OUTPATIENT
Start: 2018-09-19 | End: 2018-09-19

## 2018-09-19 RX ORDER — NALOXONE HYDROCHLORIDE 0.4 MG/ML
.1-.4 INJECTION, SOLUTION INTRAMUSCULAR; INTRAVENOUS; SUBCUTANEOUS
Status: DISCONTINUED | OUTPATIENT
Start: 2018-09-19 | End: 2018-09-19 | Stop reason: HOSPADM

## 2018-09-19 RX ORDER — HYDROCODONE BITARTRATE AND ACETAMINOPHEN 5; 325 MG/1; MG/1
1 TABLET ORAL EVERY 6 HOURS PRN
Qty: 12 TABLET | Refills: 0 | Status: SHIPPED | OUTPATIENT
Start: 2018-09-19 | End: 2018-11-13

## 2018-09-19 RX ORDER — LIDOCAINE HYDROCHLORIDE 10 MG/ML
INJECTION, SOLUTION INFILTRATION; PERINEURAL
Status: DISCONTINUED
Start: 2018-09-19 | End: 2018-09-19 | Stop reason: HOSPADM

## 2018-09-19 RX ORDER — NICOTINE POLACRILEX 4 MG
15-30 LOZENGE BUCCAL
Status: DISCONTINUED | OUTPATIENT
Start: 2018-09-19 | End: 2018-09-19 | Stop reason: HOSPADM

## 2018-09-19 RX ORDER — DEXTROSE MONOHYDRATE 25 G/50ML
25-50 INJECTION, SOLUTION INTRAVENOUS
Status: DISCONTINUED | OUTPATIENT
Start: 2018-09-19 | End: 2018-09-19 | Stop reason: HOSPADM

## 2018-09-19 RX ORDER — IOPAMIDOL 612 MG/ML
50 INJECTION, SOLUTION INTRAVASCULAR ONCE
Status: COMPLETED | OUTPATIENT
Start: 2018-09-19 | End: 2018-09-19

## 2018-09-19 RX ORDER — ACETAMINOPHEN 500 MG
500 TABLET ORAL EVERY 6 HOURS PRN
Status: DISCONTINUED | OUTPATIENT
Start: 2018-09-19 | End: 2018-09-19 | Stop reason: HOSPADM

## 2018-09-19 RX ORDER — FENTANYL CITRATE 50 UG/ML
25-50 INJECTION, SOLUTION INTRAMUSCULAR; INTRAVENOUS EVERY 5 MIN PRN
Status: DISCONTINUED | OUTPATIENT
Start: 2018-09-19 | End: 2018-09-19 | Stop reason: HOSPADM

## 2018-09-19 RX ORDER — FENTANYL CITRATE 50 UG/ML
INJECTION, SOLUTION INTRAMUSCULAR; INTRAVENOUS
Status: DISCONTINUED
Start: 2018-09-19 | End: 2018-09-19 | Stop reason: HOSPADM

## 2018-09-19 RX ORDER — FLUMAZENIL 0.1 MG/ML
0.2 INJECTION, SOLUTION INTRAVENOUS
Status: DISCONTINUED | OUTPATIENT
Start: 2018-09-19 | End: 2018-09-19 | Stop reason: HOSPADM

## 2018-09-19 RX ADMIN — HYDROCODONE BITARTRATE AND ACETAMINOPHEN 2 TABLET: 5; 325 TABLET ORAL at 08:09

## 2018-09-19 RX ADMIN — FENTANYL CITRATE 50 MCG: 50 INJECTION INTRAMUSCULAR; INTRAVENOUS at 12:23

## 2018-09-19 RX ADMIN — SODIUM CHLORIDE, PRESERVATIVE FREE: 5 INJECTION INTRAVENOUS at 13:40

## 2018-09-19 RX ADMIN — HYDROCODONE BITARTRATE AND ACETAMINOPHEN 2 TABLET: 5; 325 TABLET ORAL at 02:30

## 2018-09-19 RX ADMIN — MIDAZOLAM HYDROCHLORIDE 1 MG: 1 INJECTION, SOLUTION INTRAMUSCULAR; INTRAVENOUS at 12:12

## 2018-09-19 RX ADMIN — FENTANYL CITRATE 50 MCG: 50 INJECTION INTRAMUSCULAR; INTRAVENOUS at 12:13

## 2018-09-19 RX ADMIN — CLINDAMYCIN HYDROCHLORIDE 300 MG: 300 CAPSULE ORAL at 08:09

## 2018-09-19 RX ADMIN — HEPARIN SODIUM 10000 UNITS: 10000 INJECTION, SOLUTION INTRAVENOUS; SUBCUTANEOUS at 11:49

## 2018-09-19 RX ADMIN — IOPAMIDOL 50 ML: 612 INJECTION, SOLUTION INTRAVENOUS at 12:33

## 2018-09-19 RX ADMIN — CLINDAMYCIN HYDROCHLORIDE 300 MG: 300 CAPSULE ORAL at 13:31

## 2018-09-19 RX ADMIN — SENNOSIDES AND DOCUSATE SODIUM 1 TABLET: 8.6; 5 TABLET ORAL at 14:49

## 2018-09-19 RX ADMIN — FENTANYL CITRATE 50 MCG: 50 INJECTION INTRAMUSCULAR; INTRAVENOUS at 11:57

## 2018-09-19 RX ADMIN — RIVAROXABAN 15 MG: 15 TABLET, FILM COATED ORAL at 14:44

## 2018-09-19 RX ADMIN — LIDOCAINE HYDROCHLORIDE 6 ML: 10 INJECTION, SOLUTION INFILTRATION; PERINEURAL at 12:32

## 2018-09-19 RX ADMIN — MIDAZOLAM HYDROCHLORIDE 1 MG: 1 INJECTION, SOLUTION INTRAMUSCULAR; INTRAVENOUS at 11:56

## 2018-09-19 RX ADMIN — SODIUM CHLORIDE, PRESERVATIVE FREE: 5 INJECTION INTRAVENOUS at 04:09

## 2018-09-19 RX ADMIN — MIDAZOLAM HYDROCHLORIDE 1 MG: 1 INJECTION, SOLUTION INTRAMUSCULAR; INTRAVENOUS at 12:23

## 2018-09-19 RX ADMIN — HYDROCODONE BITARTRATE AND ACETAMINOPHEN 2 TABLET: 5; 325 TABLET ORAL at 13:31

## 2018-09-19 RX ADMIN — ALTEPLASE 0.5 MG/HR: KIT at 04:09

## 2018-09-19 RX ADMIN — DIAZEPAM 2 MG: 2 TABLET ORAL at 11:25

## 2018-09-19 ASSESSMENT — ACTIVITIES OF DAILY LIVING (ADL)
ADLS_ACUITY_SCORE: 10

## 2018-09-19 ASSESSMENT — PAIN DESCRIPTION - DESCRIPTORS: DESCRIPTORS: ACHING

## 2018-09-19 NOTE — PLAN OF CARE
Problem: Patient Care Overview  Goal: Plan of Care/Patient Progress Review  Outcome: Completed Date Met: 09/19/18  Pt to D/C to home.  Pt provided with d/c instructions, including new medications, when medications were last given, and when to take them again.  Pt also informed to f/u with vascular on the 26th[place] in 1-2 weeks, and a primary care physician in 1 week.  Pt verbalized understanding of all d/c and f/u instructions.  All questions were answered at this time.  Copy of paperwork sent with pt.  Medication sent with pt.  Father to provide transport.  All personal belongings sent with pt.     Pt alert and oriented. Neuros intact. R arm TPA site soft, slightly tender, no bleeding noted. Unchanged at each check. Norco for pain in arm and mouth. IV removed. Lungs clear, BM today. Tele SB. Tolerating regular diet, but eating soft foods.

## 2018-09-19 NOTE — PROGRESS NOTES
[facility]    Hospitalist Progress Note :     Cumulative Summary: Rickie Lagos is a27 year old male with no significant past medical history with recent wisdom tooth extraction last Thursday who was admitted on 9/16/2018 with acute right subclavian and axillary DVT.  He presented to the emergency room with right arm pain and tightening.  Patient was admitted for further evaluation and management.  Patient was a started on heparin infusion And vascular surgery and medicine were consulted.    Assessment & Plan     Active Problems:  Acute occlusive right subclavian and axillary DVT: Initially was thought to be secondary to catheter induced as IV was placed for his recent dental extraction.  But according to vascular surgery, spontaneous thrombosis of right axillary subclavian vein and right handed male with no prior symptoms including neurogenic TOS symptoms they do not think that his DVT is secondary to antecubital IV placement.  According to vascular surgery it is most likely due to thoracic outlet syndrome and he already had discussion regarding this at length with patient and family.  Patient underwent right arm venogram with planned placement of catheter across the thrombosed vessels into the superior vena cava and plan for initiating lytic therapy with Dr. Drake of intervention radiology.  Vascular surgery is also recommending sending patient home on oral anticoagulant with planned surgery for transaxillary first rib resection.  In approximately 2 weeks after successful lysis of thrombus.  Patient underwent upper extremity venogram with lysis initiation, he is planned to undergo venogram this afternoon  Recent wisdom tooth extraction: on Thursday , pain is better this morning with the current pain med's, no active bleeding     --Continue to monitor patient closely.  --Appreciate vascular medicine, vascular surgery and intervention radiology help.  --Alteplase infusion as per vascular  surgery recommendation  --Heparin infusion, managed by vascular surgery and medicine, recommending him to be switched to NOAC agents later today   -- continue current pain med's     DVT Prophylaxis: on lysis  Code Status: Full Code    Disposition: Expected discharge once patient is stable and ok to be discharged from vascular medicine and surgery point of view , tentative discharge later today or tomorrow.    Nemo Montalvo MD, FACP  Text Page (7am - 6pm)      Interval History    Patient was seen and examined, was sleeping initially , aware about plan for venogram .tooth pain is stable on current med's.    -Data reviewed today: I reviewed all new labs and imaging results over the last 24 hours.    I personally reviewed no images or EKG's today.    Physical Exam   Temp: 97.7  F (36.5  C) Temp src: Oral BP: 123/82   Heart Rate: 45 Resp: 13 SpO2: 100 % O2 Device: None (Room air)    Vitals:    09/16/18 1521   Weight: 76.7 kg (169 lb)     Vital Signs with Ranges  Temp:  [97.4  F (36.3  C)-98.3  F (36.8  C)] 97.7  F (36.5  C)  Heart Rate:  [44-75] 45  Resp:  [7-19] 13  BP: (113-144)/(60-93) 123/82  SpO2:  [95 %-100 %] 100 %  I/O last 3 completed shifts:  In: 3983.5 [P.O.:480; I.V.:3503.5]  Out: -     GENERAL: Alert , awake and oriented. NAD. Conversational, appropriate, family present in the room.   HEENT: Normocephalic. EOMI. No icterus or injection. Nares normal.   LUNGS: Clear to auscultation. No dyspnea at rest.   HEART: Regular rate. Extremities perfused.   ABDOMEN: Soft, nontender, and nondistended. Positive bowel sounds.   EXTREMITIES: No LE edema noted.   NEUROLOGIC: Moves extremities x4 on command. No acute focal neurologic abnormalities noted.     Medications     alteplase (ACTIVASE) 5 mg/500 mL infusion (LINE 1) 0.5 mg/hr (09/19/18 0723)     heparin 500 Units/hr (09/19/18 0723)     - MEDICATION INSTRUCTIONS -       - MEDICATION INSTRUCTIONS -       sodium chloride 75 mL/hr at 09/19/18 0409       clindamycin  300  mg Oral 4x Daily     docusate sodium  100 mg Oral BID     lidocaine         sodium chloride (PF)  3 mL Intracatheter Q8H       Data     Recent Labs  Lab 09/19/18  0520 09/18/18  0625 09/17/18  0719 09/16/18  1720   WBC 7.0 6.1  --  10.5   HGB 14.4 14.4  --  14.8   MCV 89 89  --  87    207  --  280   NA  --   --   --  138   POTASSIUM  --   --  4.5 3.3*   CHLORIDE  --   --   --  101   CO2  --   --   --  25   BUN  --   --   --  9   CR 0.83 0.82  --  0.92   ANIONGAP  --   --   --  12   JC  --   --   --  9.8   GLC  --   --   --  85       Imaging:   No results found for this or any previous visit (from the past 24 hour(s)).

## 2018-09-19 NOTE — PROVIDER NOTIFICATION
Spoke with Suzan VILLAVICENCIO and Dr. Montalvo regarding discharge the pt.  MD and PA stated they would put in discharge orders.

## 2018-09-19 NOTE — PROGRESS NOTES
SW: Per vascular medicine request, call made to financial counseling office with request to contact pt re: insurance options. Pt is covered currently under his parents insurance however is turning 27yo on 10/7/18 and therefore will no longer qualify for this. Message left with FC ([phone]).

## 2018-09-19 NOTE — PROGRESS NOTES
Pt back to floor from IR. VSS. Pulses palpable. Alert and oriented. TPA DCed in IR, gauze and tegaderm dressing intact.

## 2018-09-19 NOTE — PLAN OF CARE
Problem: Patient Care Overview  Goal: Plan of Care/Patient Progress Review  Pt A&OX4, VSS on RA. LS clear. +BS, +CMS, +2 pulses. R arm circumference 29.5cm. Sling in use. norco given for pain x1, effective. Valium given for anxiety x1, effective. neuros intact. Up SBA to BR. Ate breakfast, NPO for venogram procedure. Tele: Sinus presley, HR 45-55.  VSS post procedure. +CMS. TPA rate reduced by half. norco given for pain. Eating dinner, NPO at midnight for procedure tomorrow.

## 2018-09-19 NOTE — DISCHARGE SUMMARY
"[facility]    Discharge Summary  Hospitalist    Date of Admission:  2018  Date of Discharge:  2018  Discharging Provider[facility]    Discharge Diagnoses     Active Problems:  Acute occlusive right subclavian and axillary DVT  Recent wisdom tooth extraction    History of Present Illness   As Per Admitting MD: Rickie Lagos is a 27 year old male who presents with right arm pain.  Patient reports he had sudden onset of pain in his right axillary and pectoral region he describes as \"tightening\".  This was associated with reddish/purple discoloration of his arm and bulging veins in addition to mild swelling.  He denies any shortness of breath, chest pain or pressure or other symptoms on review of systems.  His presenting have improved at time of my interview, currently reporting only mild tightness of the right arm and lateral pectoral region.  Presented to the emergency room where ultrasound showed occlusive thrombus in the subclavian and axillary veins.  He has no personal history of clotting disorder.  There are no immediate family members with clotting disorder, though his mother reports her father experienced a clot and she believes her paternal grandfather  of a clot.  The specifics  of these events are unknown.  He had recent wisdom tooth extraction but otherwise denies any immobilization    Hospital Course   Rickie Lagos was admitted on 2018.  The following problems were addressed during his hospitalization:    Rickie Lagos is a27 year old male with no significant past medical history with recent wisdom tooth extraction last Thursday who was admitted on 2018 with acute right subclavian and axillary DVT.  He presented to the emergency room with right arm pain and tightening.  Patient was admitted for further evaluation and management.  Patient was a started on heparin infusion And vascular surgery and medicine were consulted    Acute occlusive " right subclavian and axillary DVT: Initially was thought to be secondary to catheter induced as IV was placed for his recent dental extraction.  But according to vascular surgery, spontaneous thrombosis of right axillary subclavian vein in right handed male with no prior symptoms including neurogenic TOS symptoms they do not think that his DVT is secondary to antecubital IV placement.  According to vascular surgery it is most likely due to  Thoracic outlet syndrome and he already had discussion regarding this at length with patient and family. Patient underwent right arm venogram with planned placement of catheter across the thrombosed vessels into the superior vena cava and plan for initiating lytic therapy with intervention radiology.  Vascular surgery is also recommending sending patient home on oral anticoagulant with planned surgery for transaxillary first rib resection in approximately 2 weeks after successful lysis of thrombus.  Patient underwent upper extremity venogram with lysis initiation, he underwent venogram this afternoon, he is planned to be   Recent wisdom tooth extraction: on Thursday , pain is better this morning with the current pain med's, no active bleeding      --Continue to monitor patient closely.  --Appreciate vascular medicine, vascular surgery and intervention radiology help.  -- patient will continue to take Xarelto after the discharge, appreciate vascular medicine help who have ordered the follow up ultra sound and then arrange for the follow up with vascular medicine.  -- Patient is planned to undergo out patient surgery for transaxillary first rib resection in approximately 2 weeks     Patient was seen and examined on the day of discharge , he is feeling well, does not have any complaints , I did review the discharge medications and instructions with the patient and plan for him to follow up with the PCP after the hospitalization .patient was in agreement , he is discharged in stable  condition back to his home.    Nemo Montalvo MD, FACP    Significant Results and Procedures    As Below.    Pending Results   NONE    Unresulted Labs Ordered in the Past 30 Days of this Admission     No orders found from 7/18/2018 to 9/17/2018.          Code Status   Full Code       Primary Care Physician   Physician No Ref-Primary    Physical Exam   Temp: 97.8  F (36.6  C) Temp src: Oral BP: 118/86   Heart Rate: 69 Resp: 8 SpO2: 98 % O2 Device: None (Room air)    Vitals:    09/16/18 1521   Weight: 76.7 kg (169 lb)     Vital Signs with Ranges  Temp:  [97.4  F (36.3  C)-98.1  F (36.7  C)] 97.8  F (36.6  C)  Heart Rate:  [45-75] 69  Resp:  [7-26] 8  BP: (113-148)/(60-93) 118/86  SpO2:  [95 %-100 %] 98 %  I/O last 3 completed shifts:  In: 3983.5 [P.O.:480; I.V.:3503.5]  Out: -     Constitutional: Awake, alert, cooperative, no apparent distress  Respiratory: Clear to auscultation bilaterally, no crackles or wheezing  Cardiovascular: Regular rate and rhythm, normal S1 and S2, and no murmur noted  GI: Normal bowel sounds, soft, non-distended, non-tender  Skin/Integumen: No rashes, no cyanosis, no edema      Discharge Disposition   Discharged to home  Condition at discharge: Stable    Consultations This Hospital Stay   PHARMACY TO DOSE HEPARIN  PHARMACY TO DOSE HEPARIN  VASCULAR SURGERY IP CONSULT  PHARMACY TO DOSE HEPARIN  [place] [facility] IP CONSULT  PHARMACY IP CONSULT  SMOKING CESSATION PROGRAM IP CONSULT    Time Spent on this Encounter   I, Nemo Montalvo, personally saw the patient today and spent less than or equal to 30 minutes discharging this patient.    Discharge Orders     US Upper Extremity Venous Duplex Right     Follow-up and recommended labs and tests    Follow up with Dr. Moon on 9/26/18 at 12:10 [facility]. You will need an ultrasound of your arm done just prior to this. If you need to reschedule or have any questions, please call [phone].      The surgery scheduler will call you in regards to time surgery will be scheduled with Dr. Madden.     Reason for your hospital stay   You were admitted to the hospital secondary to right upper extremity DVT     Follow-up and recommended labs and tests   Follow up with primary care provider, Physician No Ref-Primary, within 7 days to evaluate treatment change and for hospital follow- up.  The following labs/tests are recommended: BMP and CBC.     Activity   Your activity upon discharge: activity as tolerated and no driving for today     Discharge Instructions     Full Code     Diet   Follow this diet upon discharge: Orders Placed This Encounter     Regular Diet Adult         Discharge Medications   Current Discharge Medication List      START taking these medications    Details   rivaroxaban ANTICOAGULANT (XARELTO) 15 MG TABS tablet Take 1 tablet (15 mg) by mouth 2 times daily (with meals)  Qty: 42 tablet, Refills: 0    Associated Diagnoses: Acute thrombosis of right subclavian vein (H)         CONTINUE these medications which have CHANGED    Details   HYDROcodone-acetaminophen (NORCO) 5-325 MG per tablet Take 1 tablet by mouth every 6 hours as needed for severe pain Every 4 hours to every 6 hours as needed  Qty: 12 tablet, Refills: 0    Associated Diagnoses: Acute thrombosis of right subclavian vein (H)         CONTINUE these medications which have NOT CHANGED    Details   clindamycin (CLEOCIN) 300 MG capsule Take 300 mg by mouth 4 times daily           Allergies   Allergies   Allergen Reactions     Amoxicillin Rash     Data   Most Recent 3 CBC's:  Recent Labs   Lab Test  09/19/18   0520 09/18/18   0625  09/16/18   1720   WBC  7.0  6.1  10.5   HGB  14.4  14.4  14.8   MCV  89  89  87   PLT  194  207  280      Most Recent 3 BMP's:  Recent Labs   Lab Test  09/19/18   0520 09/18/18   0625  09/17/18   0719  09/16/18   1720   NA   --    --    --   138   POTASSIUM   --    --   4.5  3.3*   CHLORIDE   --    --    --    101   CO2   --    --    --   25   BUN   --    --    --   9   CR  0.83  0.82   --   0.92   ANIONGAP   --    --    --   12   JC   --    --    --   9.8   GLC   --    --    --   85     Most Recent 2 LFT's:No lab results found.  Most Recent INR's and Anticoagulation Dosing History:  Anticoagulation Dose History     There is no flowsheet data to display.        Most Recent 3 Troponin's:No lab results found.  Most Recent Cholesterol Panel:No lab results found.  Most Recent 6 Bacteria Isolates From Any Culture (See EPIC Reports for Culture Details):No lab results found.  Most Recent TSH, T4 and A1c Labs:No lab results found.  Results for orders placed or performed during the hospital encounter of 09/16/18   US Upper Extremity Venous Duplex Right    Narrative    US UPPER EXTREMITY VENOUS DUPLEX RIGHT  9/16/2018 4:55 PM     HISTORY: Paresthesias, color change.    COMPARISON: None.    TECHNIQUE: Examination of the upper extremity veins was performed with  graded compression and 2-D ultrasound and color doppler spectral  waveform analysis.     FINDINGS:  There is occlusive thrombus in the right subclavian vein  through the axillary vein. The basilic and cephalic and brachial veins  are patent.  The veins in the forearm show no evidence of thrombosis.      Impression    IMPRESSION: Occlusive thrombus in the right subclavian and axillary  veins.        LAUREEN MENDIETA MD   Chest CT, IV contrast only - PE protocol    Narrative    CT CHEST PULMONARY EMBOLISM WITH CONTRAST  9/16/2018 6:22 PM     HISTORY:   Shortness of breath. Evaluate for pulmonary embolism.    TECHNIQUE:    Helical axial scans from lung apices through lung bases  with 65 mL Isovue-370 IV contrast. Radiation dose for this scan was  reduced using automated exposure control, adjustment of the mA and/or  kV according to patient size, or iterative reconstruction technique.    COMPARISON:    None.    FINDINGS:    There is no CT evidence for pulmonary embolism or  other  acute vascular abnormality of the chest. The mediastinal and hilar  structures are unremarkable. The lungs are clear bilaterally.  Visualized upper abdomen shows mild splenomegaly and no other  abnormality.      Impression    IMPRESSION:  1. No evidence for pulmonary embolism.  2. Mild splenomegaly.      AJMES CLAYTON MD   XR Chest 1 View    Narrative    XR CHEST 1 VW 9/17/2018 10:55 AM     HISTORY: hx of subclavian/axillary DVT; please eval for cervical rib;     COMPARISON: None      Impression    IMPRESSION: The cardiac silhouette and pulmonary vasculature are  normal. The lungs are clear. There is no evidence of a cervical rib.    DAQUAN LISA MD   IR Upper Extremity Venogram Right    Narrative    PROCEDURE:     Right upper extremity venogram  Right Central venogram  Initiation chemical thrombolysis.    DATE OF PROCEDURE:  9/17/2018 1:56 PM    OPERATORS:    Jey Maldonado MD    MEDICATIONS:  1% lidocaine SQ, 2.5 mg IV Versed, 125 mcg IV fentanyl, 6 mg IV TPA    CONTRAST:    12    REFERENCED AIR KERMA: 18 mGy  FLUOROSCOPY TIME: 4 minutes    ESTIMATED BLOOD LOSS:  Minimal    COMPLICATIONS:  None    PRE-PROCEDURE DIAGNOSIS: Right axillary and subclavian venous thrombus  POST-PROCEDURE DIAGNOSIS: Same    CLINICAL HISTORY/INDICATION:  27-year-old male with unprovoked right axillary and subclavian venous  thrombus presents for catheter directed thrombolysis.    PROCEDURE AND FINDINGS:    Following a discussion of the risks, benefits, indications and  alternatives to treatment, appropriate informed consent was obtained.   The patient was brought to the interventional radiology suite and  placed supine on the table. The patient's right arm was prepped and  draped using maximum sterile barrier technique for tunneled line  placement including: cap AND mask AND sterile gown AND sterile gloves  AND sterile full body drape AND hand hygiene AND skin preparation 2%  chlorhexidine for cutaneous antisepsis (or  acceptable alternative  antiseptics).  A timeout was performed per hospital universal protocol  policy to ensure correct patient, site, and procedure to be performed.    Ultrasound was utilized to evaluate the veins of the right arm and a  permanent record of the image was obtained, which demonstrated the  basilic vein to be patent. Under direct ultrasound guidance, 1%  lidocaine was infiltrated and access was gained easily into the  basilic vein utilizing micropuncture technique.    A guide wire was then advanced into the vein. Exchange made for a 5  Jordanian micropuncture dilator. Right upper extremity venogram was  performed showing patent brachial and basilic veins. The axillary vein  is occluded. Exchange was then made for a 6 Jordanian vascular sheath  through which a 0.035 inch Glidewire and angled [employer] catheter  were used in tandem to traverse the axillary occlusion. With the  catheter tip within the subclavian vein, digital subtraction  angiography was performed showing occlusive and nonocclusive thrombus  within the subclavian vein extending into numerous sidebranches.  Contrast flows via collateral pathways into the SVC. The wire and  catheter were then used to traverse the occluded subclavian vein into  the SVC. With the catheter tip in the superior vena cava, digital  subtraction angiography was performed showing widely patent superior  vena cava without evidence of caval thrombus. The catheter was removed  over a wire and a 20 cm infusion length catheter was advanced over the  wire and positioned with the leading marker in the superior vena cava  and the trailing marker in the trailing axillary vein. 6 mg of TPA was  bolused through the infusion catheter. The sheath was sutured in place  with a 2-0 Ethilon suture and the infusion catheter secured.     Throughout the procedure, the patient was monitored by a radiology  nurse for cardiac rhythm which remained stable. The patient tolerated  the procedure  well and left the interventional radiology suite in  stable condition.      Impression    IMPRESSION:     1.  Right upper extremity and central venogram showing occlusive and  nonocclusive thrombus present within the axillary and subclavian  veins. The superior vena cava is patent. Peripheral brachial and  basilic veins are patent.  2.  Initiation thrombolytic therapy via a 20 cm infusion length  catheter extending from the axillary vein to the SVC.    PLAN:  TPA will be administered at a rate of 1 mg per hour via the infusion  catheter  Heparin 500 units IV via the side arm of the vascular sheath.    JESSICA HIGH MD   IR Angiogram through Catheter Follow Up    Narrative    INTERVENTIONAL RADIOLOGY ANGIOGRAM THROUGH CATHETER FOLLOW UP  September 19, 2018 4:43 PM     HISTORY: Patient undergoing catheter directed thrombolysis to treat  thrombus in the right medial subclavian vein.    COMPARISON: 9/18/2018.    DESCRIPTION OF PROCEDURE: The patient was placed in a supine position  on the fluoroscopy table. The right arm including external portions of  existing right arm sheath and infusion catheter were prepped and  draped in the usual sterile manner. 1% lidocaine was injected for  local anesthesia. A venogram was performed through existing infusion  catheter.    FINDINGS: There had been significant interval lysis of thrombus from  the medial right subclavian vein. Severe stenosis within the  subclavian vein was identified. This was treated with balloon  angioplasty using a 12 mm and 14 mm balloons. Follow-up venogram  showed improvement in luminal diameter and improved flow however flow  remained fairly slow with persistent filling of multiple collaterals  lateral to the right subclavian vein. Therefore, it was elected to  continue thrombolysis for an additional night. A 5 Persian infusion  catheter was deployed across the medial subclavian vein and catheter  directed thrombolysis was restarted.    I determined this  patient to be an appropriate candidate for the  planned sedation and procedure and reassessed the patient immediately  prior to sedation and procedure. The patient tolerated the procedure  well. There were no immediate postprocedure complications. The  patient's vital signs were monitored by radiology nursing staff under  my supervision and remained stable throughout the study.     MEDICATIONS: 4 mg Versed, 200 mcg fentanyl.    Sedation time: 51 minutes.    Fluoroscopy time: 3.8 minutes.    Total fluoroscopy dose: 15.2 mGy.    Contrast: 30 mL Isovue.      Impression    IMPRESSION: Venography shows improvement in amounts of thrombus from  the right subclavian vein. A stenosis within the vein was  angioplastied as above. There remains some residual thrombus with  obstruction to flow. Therefore, catheter directed thrombolysis will be  continued overnight. TPA check would be performed tomorrow.

## 2018-09-19 NOTE — PROVIDER NOTIFICATION
MD Notification    Notified Person: MD    Notified Person Name: MAYE Faustin    Notification Date/Time: 9/19 1322    Notification Interaction: Paged, phone    Purpose of Notification: Pt back from IR, need anticoagulation orders.    Orders Received: PA will talk with dr. Moon, will order meds.     Comments:

## 2018-09-19 NOTE — PROGRESS NOTES
[facility]    Vascular Medicine Progress Note    Attestation: I have examined the patient independently of Suzan Sweet PA-C and agree with the examination and plan as delineated below.    Luther Moon MD      Date of Service (when I saw the patient): 09/19/2018     Assessment & Plan   1. Acute right upper extremity DVT likely secondary to TOS    Assessment:   -Recent oral surgery with right antecubital IV  -Venogram with initiation of lysis 9/17/18 - findings suggestive of TOS  -tolerating lysis. Slight bleeding from wisdom tooth sites.    Plan:   -Await repeat venogram today.   -Can change to NOAC when lysis completed.   -Will need right 1st rib resection in approximately 2 weeks with Dr. Madden.  -Patient is turning 28 on 10/7 - falls off his parent's insurance. Father with questions regarding coverage. Discussed with social work who will get  in hospital to contact them.     Interval History   Doing well. Awaiting lysis recheck later today.     Physical Exam   Temp: 97.7  F (36.5  C) Temp src: Oral BP: 123/82   Heart Rate: 45 Resp: 13 SpO2: 100 % O2 Device: None (Room air)    Vitals:    09/16/18 1521   Weight: 76.7 kg (169 lb)     Vital Signs with Ranges  Temp:  [97.4  F (36.3  C)-98.3  F (36.8  C)] 97.7  F (36.5  C)  Heart Rate:  [44-75] 45  Resp:  [7-19] 13  BP: (113-144)/(60-93) 123/82  SpO2:  [95 %-100 %] 100 %  I/O last 3 completed shifts:  In: 3983.5 [P.O.:480; I.V.:3503.5]  Out: -     Constitutional: Awake, alert, cooperative, no apparent distress, and appears stated age.  Eyes: Lids and lashes normal, sclera clear, conjunctiva normal.  ENT: Normocephalic, without obvious abnormality, atraumatic, oral pharynx with moist mucus membranes  Respiratory: No increased work of breathing, good air exchange, clear to auscultation bilaterally, no crackles or wheezing.  Cardiovascular: Regular rate and rhythm, normal S1 and S2, no S3 or S4, and no murmur noted.  GI:  Normal bowel sounds, soft, non-distended, non-tender  Skin: No bruising or bleeding, normal skin color, texture, turgor, no redness, warmth, or swelling, no rashes, no lesions  Musculoskeletal: There is no redness, warmth, or swelling of the joints.    Neurologic: Awake, alert, oriented to name, place and time.  Cranial nerves II-XII are grossly intact.    Neuropsychiatric: Calm, normal eye contact, alert, normal affect, oriented to self, place, time and situation, memory for past and recent events intact and thought process normal.  Palpable radial pulses bilaterally.     Medications     alteplase (ACTIVASE) 5 mg/500 mL infusion (LINE 1) 0.5 mg/hr (09/19/18 0723)     heparin 500 Units/hr (09/19/18 0723)     - MEDICATION INSTRUCTIONS -       - MEDICATION INSTRUCTIONS -       sodium chloride 75 mL/hr at 09/19/18 0409       clindamycin  300 mg Oral 4x Daily     docusate sodium  100 mg Oral BID     sodium chloride (PF)  3 mL Intracatheter Q8H       Data     Recent Labs  Lab 09/19/18  0520 09/18/18  0625 09/17/18  0719 09/16/18  1720   WBC 7.0 6.1  --  10.5   HGB 14.4 14.4  --  14.8   MCV 89 89  --  87    207  --  280   NA  --   --   --  138   POTASSIUM  --   --  4.5 3.3*   CHLORIDE  --   --   --  101   CO2  --   --   --  25   BUN  --   --   --  9   CR 0.83 0.82  --  0.92   ANIONGAP  --   --   --  12   JC  --   --   --  9.8   GLC  --   --   --  85     No results found for this or any previous visit (from the past 24 hour(s)).

## 2018-09-19 NOTE — PROGRESS NOTES
VASCULAR SURGERY    Rickierajesh Lagos is back from his right arm venogram.  There was still residual obstructive thrombus at the subclavian vein medially overlying the first rib at the thoracic outlet.  Dr. Maldonado performed Possis and balloon angioplasty with a fairly good result.  Some residual extrinsic compression is still noted but flow was excellent and the lumen is markedly improved but not 100% compared to the adjacent subclavian vein.  I reviewed these films and spoke with the patient on the phone.  They remove the catheter from his right antecubital fossa.  Patient still on intravenous heparin.    I discussed the findings with the patient.  He saw Dr. Moon earlier this morning will see him this afternoon.  By discharge on NOAC later today.    We plan for thoracic outlet decompression surgery with a transaxillary first rib bjpzbsdos-kawoauvqgzft-onnniichwjio subclavian vein in 10-14 days.  Patient is presently being covered by his parents insurance will turn 28 on 10/7/2018.  Dr. voice asked the  this talk to him.  I will also have the patient call my  Kellen Ashton to see if we can do his surgery the Friday immediately prior to his birthday.  This may depend on the OR schedule the patient is aware of this.  Dr. Moon will recommend should hold his NOAC preoperatively.    Early I discussed with the patient and his parents plan operative procedure with its risks and benefits including the possibility of the pleural tear was easily drained for several days.  We did restart him on his NOAC medially following the surgery and continue this usually for 3 months.  We have a scheduled repeat venogram approximately 2 weeks post procedure to make sure that the subclavian vein is patent and there are instances where repeat balloon venoplasty is necessary.      Kong Madden MD   9/19/2018

## 2018-09-19 NOTE — PROGRESS NOTES
"Brief Progress Note    Chart Reviewed.  RUE venogram and lytics again yesterday.  Stable overnight.  NPO for repeat venogram.    Vital signs:  Temp: 98.1  F (36.7  C) Temp src: Oral BP: 129/80   Heart Rate: 69 Resp: 13 SpO2: 96 % O2 Device: None (Room air)   Height: 193 cm (6' 4\") Weight: 76.7 kg (169 lb)  Estimated body mass index is 20.57 kg/(m^2) as calculated from the following:    Height as of this encounter: 1.93 m (6' 4\").    Weight as of this encounter: 76.7 kg (169 lb).    Constitutional:  NAD  HEENT: normocephalic  CV: RRR  Pulm: CTAB, nonlabored respirations  GI: soft, NT/ND  MSK: warm, dry, pink, 2+ L radial, 1+ R radial, 2+ DP/PT bilaterally  Neuro: A&O, motor/sensory grossly intact  Psych: Appropriate    Rickie Lagos is a 27 year old male who was admitted on 9/16/2018. I was asked to see the patient for R axillary/sublcavian DVT.  Venogram and lytics initiated by IR on 9/17    -plan to repeat venogram with IR today  -once stable, transition to PO anticoagulant per [facility]  -pending clinical findings, may required surgery in several weeks  -please refer to Dr. Madden's note from 9/17 for further details  -overall care per primary  -following  "

## 2018-09-19 NOTE — IR NOTE
Interventional Radiology Intra-procedural Nursing Note    Patient Name: Rickie Lagos  Medical Record Number: [id number]  Today's Date: September 19, 2018    Start Time: 1156   End of procedure time: 1230  Procedure: VENOUS LYSIS F/U WITH INTERVENTION  Report given to: JENNIFER Sawyer on [address]  Time pt departs:  :     Other Notes: right upper arm CDI with a 2x2 gauze and tegaderm. Site is soft. Bicep measures 30cm. TPA and heparin gtts stopped. Pt remains on RA. No further questions from AUGSUTA FLORENTINO

## 2018-09-19 NOTE — PROCEDURES
RADIOLOGY POST PROCEDURE NOTE w/ SEDATION  Patient name: Rickie Lagos  MRN: [id number]  : 1990    Pre-procedure diagnosis: Subclavian DVT  Post-procedure diagnosis: Same    Procedure Date/Time: 2018  12:58 PM  Procedure:    Central and RUE venogram   Angiojet mechanical/aspiration thrombectomy   cPTA subclavian vein   cPTA balloon maceration subclavian thrombus  Estimated blood loss: None  Specimen(s) collected with description: none    I determined this patient to be an appropriate candidate for the planned sedation and procedure and reassessed the patient IMMEDIATELY PRIOR to sedation and procedure.     The patient tolerated the procedure well with no immediate complications.  Significant findings:none    See imaging dictation for procedural details.    Provider name: Jey Maldonado  Assistant(s):None

## 2018-09-19 NOTE — PLAN OF CARE
Problem: Patient Care Overview  Goal: Plan of Care/Patient Progress Review  Outcome: No Change  Pt A/o x4. Up with SBA. VSS except for low HR 45-55, on RA. R. Arm immoblization board in place. IV infusing, TPA @50ml/hr, Heparin @500 unit/hr, and NS at 75ml. PRN valium given at 2200. Tele Sinus David. NPO since midnight. CMS intact, neuro checks WDL. Some C/o slight bleeding from wisdom tooth sites, and c/o HA, with [place] given for relief. R. Arm circumference is 30cm. Plan is for IR today. Discharge pending.

## 2018-09-20 ENCOUNTER — TELEPHONE (OUTPATIENT)
Dept: FAMILY MEDICINE | Facility: CLINIC | Age: 28
End: 2018-09-20

## 2018-09-20 NOTE — TELEPHONE ENCOUNTER
Reason for Call:  Other patient request to become patient of Dr. Ruiz    Detailed comments: patient was recently hospitalized with a blood clot.  The vascular clinic recommended Dr. Ruiz as a good fit for PCP.    Pt scheduled an OV with DR. Mazariegos for 9/21 because he needs to have lab work completed before next week.    Patient would like a call back asap to let him know if Dr. Ruiz will accept him as a patient.    Phone Number Patient can be reached at: Home number on file [phone] (home)    Best Time: anytime    Can we leave a detailed message on this number? YES    Call taken on 9/20/2018 at 3:35 PM by Lea Redmond  .

## 2018-09-21 ENCOUNTER — OFFICE VISIT (OUTPATIENT)
Dept: FAMILY MEDICINE | Facility: CLINIC | Age: 28
End: 2018-09-21
Payer: COMMERCIAL

## 2018-09-21 VITALS
OXYGEN SATURATION: 97 % | HEART RATE: 117 BPM | TEMPERATURE: 97.5 F | WEIGHT: 169.3 LBS | SYSTOLIC BLOOD PRESSURE: 116 MMHG | HEIGHT: 76 IN | DIASTOLIC BLOOD PRESSURE: 63 MMHG | BODY MASS INDEX: 20.62 KG/M2

## 2018-09-21 DIAGNOSIS — F41.8 SITUATIONAL ANXIETY: ICD-10-CM

## 2018-09-21 DIAGNOSIS — I82.621 ARM DVT (DEEP VENOUS THROMBOEMBOLISM), ACUTE, RIGHT (H): Primary | ICD-10-CM

## 2018-09-21 PROCEDURE — 81240 F2 GENE: CPT | Performed by: INTERNAL MEDICINE

## 2018-09-21 PROCEDURE — 85300 ANTITHROMBIN III ACTIVITY: CPT | Performed by: INTERNAL MEDICINE

## 2018-09-21 PROCEDURE — 36415 COLL VENOUS BLD VENIPUNCTURE: CPT | Performed by: INTERNAL MEDICINE

## 2018-09-21 PROCEDURE — 81241 F5 GENE: CPT | Performed by: INTERNAL MEDICINE

## 2018-09-21 PROCEDURE — 86147 CARDIOLIPIN ANTIBODY EA IG: CPT | Performed by: INTERNAL MEDICINE

## 2018-09-21 PROCEDURE — 99214 OFFICE O/P EST MOD 30 MIN: CPT | Performed by: INTERNAL MEDICINE

## 2018-09-21 PROCEDURE — 86146 BETA-2 GLYCOPROTEIN ANTIBODY: CPT | Performed by: INTERNAL MEDICINE

## 2018-09-21 RX ORDER — LORAZEPAM 0.5 MG/1
.5-1 TABLET ORAL EVERY 8 HOURS PRN
Qty: 5 TABLET | Refills: 0 | Status: ON HOLD | OUTPATIENT
Start: 2018-09-21 | End: 2018-12-21

## 2018-09-21 NOTE — MR AVS SNAPSHOT
After Visit Summary   9/21/2018    Rickie Lagos    MRN: [id number]           Patient Information     Date Of Birth          1990        Visit Information        Provider Department      9/21/2018 2:00 PM Chalo Mazariegos MD [facility]        Today's Diagnoses     Arm DVT (deep venous thromboembolism), acute, right (H)    -  1      Care Instructions    Seek immediate medical attention if you develop arm/leg swelling/discoloration, numbness/weakness of limb, chest pain, shortness of breath, sudden blindness, or any other unusual symptoms.          Follow-ups after your visit        Your next 10 appointments already scheduled     Sep 26, 2018 11:00 AM CDT   US VENOUS with SHVUS4   [facility] ([facility])    [address]  [facility] [address]   [phone]           How do I prepare for my exam? (Food and drink instructions) No Food and Drink Restrictions.  How do I prepare for my exam? (Other instructions) You do not need to do anything special to prepare for your exam.  What should I wear: Wear comfortable clothes.  How long does the exam take: Most ultrasounds take 30 to 60 minutes.  What should I bring: Bring a list of your medicines, including vitamins, minerals and over-the-counter drugs. It is safest to leave personal items at home.  Do I need a :  No  is needed.  What do I need to tell my doctor: Tell your doctor about any allergies you may have.  What should I do after the exam: No restrictions, You may resume normal activities.  What is this test: An ultrasound uses sound waves to make pictures of the body. Sound waves do not cause pain. The only discomfort may be the pressure of the wand against your skin or full bladder.  Who should I call with questions: If you have any questions, please call the Imaging Department where you will have your exam. Directions, parking  "instructions, and other information is available on our website, [email or web address].            Sep 26, 2018 12:10 PM CDT   Return Visit with Luther Moon MD   [facility] ([facility])    [address]  [place] [address] [phone]            Oct 05, 2018   Procedure with Kong Madden MD   [facility] (--)    [address]  [facility] [address] [phone]              Future tests that were ordered for you today     Open Future Orders        Priority Expected Expires Ordered    Echocardiogram ASAP  9/21/2019 9/21/2018            Who to contact     If you have questions or need follow up information about today's clinic visit or your schedule please contact [facility] directly at [phone].  Normal or non-critical lab and imaging results will be communicated to you by [employer], letter or phone within 4 business days after the clinic has received the results. If you do not hear from us within 7 days, please contact the clinic through [employer] or phone. If you have a critical or abnormal lab result, we will notify you by phone as soon as possible.  Submit refill requests through [employer] or call your pharmacy and they will forward the refill request to us. Please allow 3 business days for your refill to be completed.          Additional Information About Your Visit        [employer] Information     [employer] lets you send messages to your doctor, view your test results, renew your prescriptions, schedule appointments and more. To sign up, go to [email or web address] . Click on \"Log in\" on the left side of the screen, which will take you to the Welcome page. Then click on \"Sign up Now\" on the right side of the page.     You will be asked to enter the access code listed below, as well as some personal information. Please follow the directions to create your " "username and password.     Your access code is: [email or web address]  Expires: 12/15/2018  2:56 PM     Your access code will  in 90 days. If you need help or a new code, please call your [facility] clinic or [phone].        Care EveryWhere ID     This is your Care EveryWhere ID. This could be used by other organizations to access your [facility] medical records  [id number]        Your Vitals Were     Pulse Temperature Height Pulse Oximetry BMI (Body Mass Index)       117 97.5  F (36.4  C) (Oral) 6' 4\" (1.93 m) 97% 20.61 kg/m2        Blood Pressure from Last 3 Encounters:   18 116/63   18 135/82   18 101/70    Weight from Last 3 Encounters:   18 169 lb 4.8 oz (76.8 kg)   18 169 lb (76.7 kg)   18 169 lb 9 oz (76.9 kg)              We Performed the Following     Antithrombin Activity ([facility])     Beta 2 Glycoprotein 1 Antibody IgA     Cardiolipin Yumiko IgG and IgM     Cardiolipin Antibody IgA     F2 prothrombin [id number] Mut Anal     Factor 5 leiden mutation analysis     Lupus Anticoagulant w/reflex ([facility])     Protein C&S Activity w/reflx Atg ([facility])        Primary Care Provider Fax #    Physician No Ref-Primary [phone]       No address on file        Equal Access to Services     LIVIA DASH : Hadii erika ku hadasho Sosbali, waaxda luqadaha, qaybta kaalmada adefacundo, lashonda [place] . So [facility] [phone].    ATENCIÓN: Si habla español, tiene a tariq disposición servicios gratuitos de asistencia lingüística. Llame al [phone].    We comply with applicable federal civil rights laws and [place] laws. We do not discriminate on the basis of race, color, national origin, age, disability, sex, sexual orientation, or gender identity.            Thank you!     Thank you for choosing [facility]  for your care. Our goal is always to provide you with excellent care. Hearing back from our patients is one way we can continue to improve our services. " Please take a few minutes to complete the written survey that you may receive in the mail after your visit with us. Thank you!             Your Updated Medication List - Protect others around you: Learn how to safely use, store and throw away your medicines at www.disposemymeds.org.          This list is accurate as of 9/21/18  2:25 PM.  Always use your most recent med list.                   Brand Name Dispense Instructions for use Diagnosis    clindamycin 300 MG capsule    CLEOCIN     Take 300 mg by mouth 4 times daily        HYDROcodone-acetaminophen 5-325 MG per tablet    NORCO    12 tablet    Take 1 tablet by mouth every 6 hours as needed for severe pain Every 4 hours to every 6 hours as needed    Acute thrombosis of right subclavian vein (H)       rivaroxaban ANTICOAGULANT 15 MG Tabs tablet    XARELTO    42 tablet    Take 1 tablet (15 mg) by mouth 2 times daily (with meals)    Acute thrombosis of right subclavian vein (H)

## 2018-09-21 NOTE — NURSING NOTE
A blood draw was attempted by lab on pt after seeing  today. Before lab could draw, pt started sweating and became nauseous. He was pale and clammy. Pt was delivered to an exam room to lay down. Cold compress applied. Juice and water offered.Pt sipped on the water. Pt refused animal crackers since having his wisdom teeth out recently. 1st Vitals 103/73, 79, 100% RA. 2nd vitals 103/72, pulse 64, 100% RA.Pt's father is with him and reports that pt had a similar reaction in the ER on Sunday. The ER gave him something for anxiety.  notified.  suggested that we get ativan from the [facility] pharmacy and give it to him now. Then wait and see if he can calm down enough to try lab again. Ativan 0.5 mg given at 1530 per order. [facility].Will see if pt is ready to get the lab done at 4 pm.

## 2018-09-21 NOTE — TELEPHONE ENCOUNTER
Either provider is OK with me.  He can schedule with me if he likes or continue with Dr. Yair Ruiz MD

## 2018-09-21 NOTE — PATIENT INSTRUCTIONS
Seek immediate medical attention if you develop arm/leg swelling/discoloration, numbness/weakness of limb, chest pain, shortness of breath, sudden blindness, or any other unusual symptoms.    Echocardiogram  [phone]

## 2018-09-21 NOTE — PROGRESS NOTES
HPI    SUBJECTIVE:   Rickie Lagos is a 27 year old male who presents to clinic today for the following health issues:      Hospital Follow-up Visit:    Hospital/Nursing Home/IP Rehab Facility: [facility]  Date of Admission: 9-  Date of Discharge: 9-  Reason(s) for Admission: Acute occlusive right subclavian and axillary DVT            Problems taking medications regularly:  None       Medication changes since discharge: None       Problems adhering to non-medication therapy:  None    Summary of hospitalization:  [facility] discharge summary reviewed  Diagnostic Tests/Treatments reviewed.  Follow up needed: tests for hypercoagulable state  Other Healthcare Providers Involved in Patient s Care: surgeon  Update since discharge: improved.     Post Discharge Medication Reconciliation: discharge medications reconciled and changed, per note/orders (see AVS).  Plan of care communicated with patient and father     Coding guidelines for this visit:  Type of Medical   Decision Making Face-to-Face Visit       within 7 Days of discharge Face-to-Face Visit        within 14 days of discharge   Moderate Complexity [id number]   High Complexity [id number]            Since the patient's discharge from hospital, he has not had any further episodes of right arm swelling.  Denies numbness, tingling, weakness of the right upper extremity.  No chest pain, shortness of breath, hemoptysis.  No leg swelling.      Past Medical History:   Diagnosis Date     DVT of axillary vein, acute right (H) 09/16/2018     TOS (thoracic outlet syndrome) 09/16/2018       Review of Systems   Constitutional: Negative for chills, fever and malaise/fatigue.   Respiratory: Negative for hemoptysis and shortness of breath.    Cardiovascular: Negative for chest pain, claudication and leg swelling.   Skin: Negative for rash.       /63 (BP Location: Right arm, Cuff Size: Adult Large)  Pulse 117  Temp 97.5  F (36.4  " C) (Oral)  Ht 6' 4\" (1.93 m)  Wt 169 lb 4.8 oz (76.8 kg)  SpO2 97%  BMI 20.61 kg/m2      Physical Exam   Constitutional: He is oriented to person, place, and time. No distress.   Cardiovascular: Normal rate, regular rhythm, normal heart sounds and intact distal pulses.    Pulmonary/Chest: Effort normal and breath sounds normal. No respiratory distress.   Musculoskeletal: Normal range of motion. He exhibits no edema or tenderness.   Neurological: He is alert and oriented to person, place, and time. GCS score is 15.   Skin: No erythema.   Vitals reviewed.        ICD-10-CM    1. Arm DVT (deep venous thromboembolism), acute, right (H) I82.621 Beta 2 Glycoprotein 1 Antibody IgA     Cardiolipin Yumiko IgG and IgM     Cardiolipin Antibody IgA     Protein C&S Activity w/reflx Atg ([facility])     Lupus Anticoagulant w/reflex ([facility])     Factor 5 leiden mutation analysis     F2 prothrombin 42498H Mut Anal     Echocardiogram     Antithrombin III     CANCELED: Antithrombin Activity ([facility])   2. Situational anxiety F41.8 LORazepam (ATIVAN) 0.5 MG tablet     **During his blood draw at the laboratory, the patient had a vasovagal reaction; he was promptly brought to one of the examination rooms and subsequently improved after being given oral lorazepam; reattempt to draw blood by the phlebotomist in the examination room was successful      Patient Instructions   Seek immediate medical attention if you develop arm/leg swelling/discoloration, numbness/weakness of limb, chest pain, shortness of breath, sudden blindness, or any other unusual symptoms.    Echocardiogram  [phone]          "

## 2018-09-22 LAB — AT III ACT/NOR PPP CHRO: 117 % (ref 85–135)

## 2018-09-23 LAB — B2 GLYCOPROT1 IGA SER-ACNC: 1.4 U/ML

## 2018-09-26 ENCOUNTER — APPOINTMENT (OUTPATIENT)
Dept: INTERVENTIONAL RADIOLOGY/VASCULAR | Facility: CLINIC | Age: 28
DRG: 271 | End: 2018-09-26
Attending: INTERNAL MEDICINE
Payer: COMMERCIAL

## 2018-09-26 ENCOUNTER — HOSPITAL ENCOUNTER (OUTPATIENT)
Dept: ULTRASOUND IMAGING | Facility: CLINIC | Age: 28
DRG: 271 | End: 2018-09-26
Attending: PHYSICIAN ASSISTANT
Payer: COMMERCIAL

## 2018-09-26 ENCOUNTER — HOSPITAL ENCOUNTER (OUTPATIENT)
Facility: CLINIC | Age: 28
DRG: 271 | End: 2018-09-26
Attending: INTERNAL MEDICINE | Admitting: INTERNAL MEDICINE
Payer: COMMERCIAL

## 2018-09-26 ENCOUNTER — OFFICE VISIT (OUTPATIENT)
Dept: OTHER | Facility: CLINIC | Age: 28
DRG: 271 | End: 2018-09-26
Attending: PHYSICIAN ASSISTANT
Payer: COMMERCIAL

## 2018-09-26 ENCOUNTER — HOSPITAL ENCOUNTER (OUTPATIENT)
Dept: CARDIOLOGY | Facility: CLINIC | Age: 28
DRG: 271 | End: 2018-09-26
Attending: INTERNAL MEDICINE
Payer: COMMERCIAL

## 2018-09-26 ENCOUNTER — HOSPITAL ENCOUNTER (INPATIENT)
Facility: CLINIC | Age: 28
LOS: 10 days | Discharge: HOME | DRG: 271 | End: 2018-10-06
Attending: INTERNAL MEDICINE | Admitting: INTERNAL MEDICINE
Payer: COMMERCIAL

## 2018-09-26 VITALS
DIASTOLIC BLOOD PRESSURE: 77 MMHG | BODY MASS INDEX: 21.04 KG/M2 | OXYGEN SATURATION: 100 % | HEART RATE: 81 BPM | HEIGHT: 76 IN | SYSTOLIC BLOOD PRESSURE: 120 MMHG | WEIGHT: 172.8 LBS

## 2018-09-26 DIAGNOSIS — Z01.818 PREOP GENERAL PHYSICAL EXAM: ICD-10-CM

## 2018-09-26 DIAGNOSIS — I82.621 ARM DVT (DEEP VENOUS THROMBOEMBOLISM), ACUTE, RIGHT (H): ICD-10-CM

## 2018-09-26 DIAGNOSIS — I82.A11 DVT OF AXILLARY VEIN, ACUTE RIGHT (H): Primary | ICD-10-CM

## 2018-09-26 DIAGNOSIS — G54.0 THORACIC OUTLET SYNDROME: ICD-10-CM

## 2018-09-26 DIAGNOSIS — Z01.818 PRE-OP EXAM: Primary | ICD-10-CM

## 2018-09-26 DIAGNOSIS — D62 ANEMIA DUE TO BLOOD LOSS, ACUTE: ICD-10-CM

## 2018-09-26 DIAGNOSIS — I82.B11: ICD-10-CM

## 2018-09-26 LAB
ANION GAP SERPL CALCULATED.3IONS-SCNC: 7 MMOL/L (ref 3–14)
APTT PPP: 49 SEC (ref 22–37)
APTT PPP: 56 SEC (ref 22–37)
BASOPHILS # BLD AUTO: 0 10E9/L (ref 0–0.2)
BASOPHILS NFR BLD AUTO: 0.6 %
BUN SERPL-MCNC: 12 MG/DL (ref 7–30)
CALCIUM SERPL-MCNC: 8.8 MG/DL (ref 8.5–10.1)
CHLORIDE SERPL-SCNC: 108 MMOL/L (ref 94–109)
CO2 SERPL-SCNC: 27 MMOL/L (ref 20–32)
CREAT SERPL-MCNC: 0.86 MG/DL (ref 0.66–1.25)
CREAT SERPL-MCNC: 0.89 MG/DL (ref 0.66–1.25)
DIFFERENTIAL METHOD BLD: ABNORMAL
EOSINOPHIL # BLD AUTO: 0.2 10E9/L (ref 0–0.7)
EOSINOPHIL NFR BLD AUTO: 2.4 %
ERYTHROCYTE [DISTWIDTH] IN BLOOD BY AUTOMATED COUNT: 12 % (ref 10–15)
ERYTHROCYTE [DISTWIDTH] IN BLOOD BY AUTOMATED COUNT: 12 % (ref 10–15)
GFR SERPL CREATININE-BSD FRML MDRD: >90 ML/MIN/1.7M2
GFR SERPL CREATININE-BSD FRML MDRD: >90 ML/MIN/1.7M2
GLUCOSE SERPL-MCNC: 85 MG/DL (ref 70–99)
HCT VFR BLD AUTO: 39.9 % (ref 40–53)
HCT VFR BLD AUTO: 42.7 % (ref 40–53)
HGB BLD-MCNC: 13.4 G/DL (ref 13.3–17.7)
HGB BLD-MCNC: 14.5 G/DL (ref 13.3–17.7)
IMM GRANULOCYTES # BLD: 0 10E9/L (ref 0–0.4)
IMM GRANULOCYTES NFR BLD: 0.1 %
INR PPP: 1.33 (ref 0.86–1.14)
INR PPP: 1.56 (ref 0.86–1.14)
LYMPHOCYTES # BLD AUTO: 2.1 10E9/L (ref 0.8–5.3)
LYMPHOCYTES NFR BLD AUTO: 29.6 %
MCH RBC QN AUTO: 30.2 PG (ref 26.5–33)
MCH RBC QN AUTO: 30.6 PG (ref 26.5–33)
MCHC RBC AUTO-ENTMCNC: 33.6 G/DL (ref 31.5–36.5)
MCHC RBC AUTO-ENTMCNC: 34 G/DL (ref 31.5–36.5)
MCV RBC AUTO: 90 FL (ref 78–100)
MCV RBC AUTO: 90 FL (ref 78–100)
MONOCYTES # BLD AUTO: 0.4 10E9/L (ref 0–1.3)
MONOCYTES NFR BLD AUTO: 4.9 %
NEUTROPHILS # BLD AUTO: 4.4 10E9/L (ref 1.6–8.3)
NEUTROPHILS NFR BLD AUTO: 62.4 %
NRBC # BLD AUTO: 0 10*3/UL
NRBC BLD AUTO-RTO: 0 /100
PLATELET # BLD AUTO: 278 10E9/L (ref 150–450)
PLATELET # BLD AUTO: 314 10E9/L (ref 150–450)
POTASSIUM SERPL-SCNC: 3.9 MMOL/L (ref 3.4–5.3)
RBC # BLD AUTO: 4.43 10E12/L (ref 4.4–5.9)
RBC # BLD AUTO: 4.74 10E12/L (ref 4.4–5.9)
SODIUM SERPL-SCNC: 142 MMOL/L (ref 133–144)
WBC # BLD AUTO: 7.1 10E9/L (ref 4–11)
WBC # BLD AUTO: 7.9 10E9/L (ref 4–11)

## 2018-09-26 PROCEDURE — 25000128 H RX IP 250 OP 636: Performed by: RADIOLOGY

## 2018-09-26 PROCEDURE — 99231 SBSQ HOSP IP/OBS SF/LOW 25: CPT | Performed by: SURGERY

## 2018-09-26 PROCEDURE — 85610 PROTHROMBIN TIME: CPT | Performed by: INTERNAL MEDICINE

## 2018-09-26 PROCEDURE — 85730 THROMBOPLASTIN TIME PARTIAL: CPT | Performed by: INTERNAL MEDICINE

## 2018-09-26 PROCEDURE — 25000132 ZZH RX MED GY IP 250 OP 250 PS 637: Performed by: INTERNAL MEDICINE

## 2018-09-26 PROCEDURE — 12000007 ZZH R&B INTERMEDIATE

## 2018-09-26 PROCEDURE — 36415 COLL VENOUS BLD VENIPUNCTURE: CPT | Performed by: INTERNAL MEDICINE

## 2018-09-26 PROCEDURE — 27210906 ZZH KIT CR8

## 2018-09-26 PROCEDURE — 93306 TTE W/DOPPLER COMPLETE: CPT

## 2018-09-26 PROCEDURE — 25500064 ZZH RX 255 OP 636: Performed by: RADIOLOGY

## 2018-09-26 PROCEDURE — 40000853 ZZH STATISTIC ANGIOGRAM, STENT, VERTEBRO PLASTY

## 2018-09-26 PROCEDURE — 75820 VEIN X-RAY ARM/LEG: CPT | Mod: RT

## 2018-09-26 PROCEDURE — 82565 ASSAY OF CREATININE: CPT | Performed by: INTERNAL MEDICINE

## 2018-09-26 PROCEDURE — 27210886 ZZH ACCESSORY CR5

## 2018-09-26 PROCEDURE — 85027 COMPLETE CBC AUTOMATED: CPT | Performed by: INTERNAL MEDICINE

## 2018-09-26 PROCEDURE — 3E03317 INTRODUCTION OF OTHER THROMBOLYTIC INTO PERIPHERAL VEIN, PERCUTANEOUS APPROACH: ICD-10-PCS | Performed by: RADIOLOGY

## 2018-09-26 PROCEDURE — B51M1ZZ FLUOROSCOPY OF RIGHT UPPER EXTREMITY VEINS USING LOW OSMOLAR CONTRAST: ICD-10-PCS | Performed by: RADIOLOGY

## 2018-09-26 PROCEDURE — C1769 GUIDE WIRE: HCPCS

## 2018-09-26 PROCEDURE — 85025 COMPLETE CBC W/AUTO DIFF WBC: CPT | Performed by: INTERNAL MEDICINE

## 2018-09-26 PROCEDURE — 27210804 ZZH SHEATH CR3

## 2018-09-26 PROCEDURE — G0463 HOSPITAL OUTPT CLINIC VISIT: HCPCS

## 2018-09-26 PROCEDURE — 25000125 ZZHC RX 250: Performed by: RADIOLOGY

## 2018-09-26 PROCEDURE — 93010 ELECTROCARDIOGRAM REPORT: CPT | Mod: ZP | Performed by: INTERNAL MEDICINE

## 2018-09-26 PROCEDURE — 27210896 ZZH CATH CR9

## 2018-09-26 PROCEDURE — 80048 BASIC METABOLIC PNL TOTAL CA: CPT | Performed by: INTERNAL MEDICINE

## 2018-09-26 PROCEDURE — 93306 TTE W/DOPPLER COMPLETE: CPT | Mod: 26 | Performed by: INTERNAL MEDICINE

## 2018-09-26 PROCEDURE — 99223 1ST HOSP IP/OBS HIGH 75: CPT | Performed by: INTERNAL MEDICINE

## 2018-09-26 PROCEDURE — 93971 EXTREMITY STUDY: CPT | Mod: RT

## 2018-09-26 RX ORDER — NALOXONE HYDROCHLORIDE 0.4 MG/ML
.1-.4 INJECTION, SOLUTION INTRAMUSCULAR; INTRAVENOUS; SUBCUTANEOUS
Status: DISCONTINUED | OUTPATIENT
Start: 2018-09-26 | End: 2018-09-26

## 2018-09-26 RX ORDER — BISACODYL 5 MG
5 TABLET, DELAYED RELEASE (ENTERIC COATED) ORAL DAILY PRN
Status: DISCONTINUED | OUTPATIENT
Start: 2018-09-26 | End: 2018-09-27 | Stop reason: HOSPADM

## 2018-09-26 RX ORDER — PROCHLORPERAZINE MALEATE 5 MG
10 TABLET ORAL EVERY 6 HOURS PRN
Status: DISCONTINUED | OUTPATIENT
Start: 2018-09-26 | End: 2018-09-26

## 2018-09-26 RX ORDER — ONDANSETRON 2 MG/ML
4 INJECTION INTRAMUSCULAR; INTRAVENOUS EVERY 6 HOURS PRN
Status: DISCONTINUED | OUTPATIENT
Start: 2018-09-26 | End: 2018-09-26

## 2018-09-26 RX ORDER — DIPHENHYDRAMINE HYDROCHLORIDE 50 MG/ML
50 INJECTION INTRAMUSCULAR; INTRAVENOUS ONCE
Status: COMPLETED | OUTPATIENT
Start: 2018-09-26 | End: 2018-09-26

## 2018-09-26 RX ORDER — DIPHENHYDRAMINE HYDROCHLORIDE 50 MG/ML
INJECTION INTRAMUSCULAR; INTRAVENOUS
Status: DISCONTINUED
Start: 2018-09-26 | End: 2018-09-26 | Stop reason: HOSPADM

## 2018-09-26 RX ORDER — HEPARIN SODIUM 10000 [USP'U]/100ML
500 INJECTION, SOLUTION INTRAVENOUS CONTINUOUS
Status: DISCONTINUED | OUTPATIENT
Start: 2018-09-26 | End: 2018-09-27

## 2018-09-26 RX ORDER — FENTANYL CITRATE 50 UG/ML
INJECTION, SOLUTION INTRAMUSCULAR; INTRAVENOUS
Status: DISCONTINUED
Start: 2018-09-26 | End: 2018-09-26 | Stop reason: HOSPADM

## 2018-09-26 RX ORDER — DIPHENHYDRAMINE HCL 25 MG
25 CAPSULE ORAL EVERY 4 HOURS PRN
Status: DISCONTINUED | OUTPATIENT
Start: 2018-09-26 | End: 2018-09-28

## 2018-09-26 RX ORDER — LIDOCAINE HYDROCHLORIDE 10 MG/ML
INJECTION, SOLUTION INFILTRATION; PERINEURAL
Status: DISCONTINUED
Start: 2018-09-26 | End: 2018-09-26 | Stop reason: HOSPADM

## 2018-09-26 RX ORDER — LORAZEPAM 0.5 MG/1
.5-1 TABLET ORAL
Status: DISCONTINUED | OUTPATIENT
Start: 2018-09-26 | End: 2018-09-26 | Stop reason: HOSPADM

## 2018-09-26 RX ORDER — SODIUM CHLORIDE 9 MG/ML
INJECTION, SOLUTION INTRAVENOUS CONTINUOUS
Status: DISCONTINUED | OUTPATIENT
Start: 2018-09-26 | End: 2018-09-28

## 2018-09-26 RX ORDER — POTASSIUM CHLORIDE 1.5 G/1.58G
20-40 POWDER, FOR SOLUTION ORAL
Status: DISCONTINUED | OUTPATIENT
Start: 2018-09-26 | End: 2018-09-27 | Stop reason: HOSPADM

## 2018-09-26 RX ORDER — ACETAMINOPHEN 325 MG/1
650 TABLET ORAL EVERY 4 HOURS PRN
Status: DISCONTINUED | OUTPATIENT
Start: 2018-09-26 | End: 2018-09-27 | Stop reason: HOSPADM

## 2018-09-26 RX ORDER — DIPHENHYDRAMINE HYDROCHLORIDE 50 MG/ML
25 INJECTION INTRAMUSCULAR; INTRAVENOUS EVERY 4 HOURS PRN
Status: DISCONTINUED | OUTPATIENT
Start: 2018-09-26 | End: 2018-09-28

## 2018-09-26 RX ORDER — SODIUM CHLORIDE 450 MG/100ML
INJECTION, SOLUTION INTRAVENOUS CONTINUOUS
Status: DISCONTINUED | OUTPATIENT
Start: 2018-09-26 | End: 2018-09-26

## 2018-09-26 RX ORDER — BISACODYL 10 MG
10 SUPPOSITORY, RECTAL RECTAL DAILY PRN
Status: DISCONTINUED | OUTPATIENT
Start: 2018-09-26 | End: 2018-09-27 | Stop reason: HOSPADM

## 2018-09-26 RX ORDER — HYDROMORPHONE HYDROCHLORIDE 1 MG/ML
.3-.5 INJECTION, SOLUTION INTRAMUSCULAR; INTRAVENOUS; SUBCUTANEOUS
Status: DISCONTINUED | OUTPATIENT
Start: 2018-09-26 | End: 2018-09-27 | Stop reason: HOSPADM

## 2018-09-26 RX ORDER — BISACODYL 5 MG
15 TABLET, DELAYED RELEASE (ENTERIC COATED) ORAL DAILY PRN
Status: DISCONTINUED | OUTPATIENT
Start: 2018-09-26 | End: 2018-09-27 | Stop reason: HOSPADM

## 2018-09-26 RX ORDER — OXYCODONE HYDROCHLORIDE 5 MG/1
5-10 TABLET ORAL
Status: DISCONTINUED | OUTPATIENT
Start: 2018-09-26 | End: 2018-09-27 | Stop reason: HOSPADM

## 2018-09-26 RX ORDER — BISACODYL 5 MG
10 TABLET, DELAYED RELEASE (ENTERIC COATED) ORAL DAILY PRN
Status: DISCONTINUED | OUTPATIENT
Start: 2018-09-26 | End: 2018-09-27 | Stop reason: HOSPADM

## 2018-09-26 RX ORDER — ONDANSETRON 2 MG/ML
4 INJECTION INTRAMUSCULAR; INTRAVENOUS EVERY 6 HOURS PRN
Status: DISCONTINUED | OUTPATIENT
Start: 2018-09-26 | End: 2018-09-28

## 2018-09-26 RX ORDER — ONDANSETRON 4 MG/1
4 TABLET, ORALLY DISINTEGRATING ORAL EVERY 6 HOURS PRN
Status: DISCONTINUED | OUTPATIENT
Start: 2018-09-26 | End: 2018-09-26

## 2018-09-26 RX ORDER — METOCLOPRAMIDE HYDROCHLORIDE 5 MG/ML
10 INJECTION INTRAMUSCULAR; INTRAVENOUS EVERY 6 HOURS PRN
Status: DISCONTINUED | OUTPATIENT
Start: 2018-09-26 | End: 2018-09-28

## 2018-09-26 RX ORDER — POTASSIUM CL/LIDO/0.9 % NACL 10MEQ/0.1L
10 INTRAVENOUS SOLUTION, PIGGYBACK (ML) INTRAVENOUS
Status: DISCONTINUED | OUTPATIENT
Start: 2018-09-26 | End: 2018-09-27 | Stop reason: HOSPADM

## 2018-09-26 RX ORDER — METOCLOPRAMIDE 5 MG/1
10 TABLET ORAL EVERY 6 HOURS PRN
Status: DISCONTINUED | OUTPATIENT
Start: 2018-09-26 | End: 2018-09-28

## 2018-09-26 RX ORDER — PROCHLORPERAZINE 25 MG
25 SUPPOSITORY, RECTAL RECTAL EVERY 12 HOURS PRN
Status: DISCONTINUED | OUTPATIENT
Start: 2018-09-26 | End: 2018-09-27

## 2018-09-26 RX ORDER — NALOXONE HYDROCHLORIDE 0.4 MG/ML
.1-.4 INJECTION, SOLUTION INTRAMUSCULAR; INTRAVENOUS; SUBCUTANEOUS
Status: DISCONTINUED | OUTPATIENT
Start: 2018-09-26 | End: 2018-09-27 | Stop reason: HOSPADM

## 2018-09-26 RX ORDER — NALOXONE HYDROCHLORIDE 0.4 MG/ML
.1-.4 INJECTION, SOLUTION INTRAMUSCULAR; INTRAVENOUS; SUBCUTANEOUS
Status: DISCONTINUED | OUTPATIENT
Start: 2018-09-26 | End: 2018-09-26 | Stop reason: HOSPADM

## 2018-09-26 RX ORDER — PROCHLORPERAZINE 25 MG
25 SUPPOSITORY, RECTAL RECTAL EVERY 12 HOURS PRN
Status: DISCONTINUED | OUTPATIENT
Start: 2018-09-26 | End: 2018-09-26

## 2018-09-26 RX ORDER — DOCUSATE SODIUM 100 MG/1
100 CAPSULE, LIQUID FILLED ORAL 2 TIMES DAILY
Status: DISCONTINUED | OUTPATIENT
Start: 2018-09-26 | End: 2018-09-28

## 2018-09-26 RX ORDER — PROCHLORPERAZINE MALEATE 5 MG
10 TABLET ORAL EVERY 6 HOURS PRN
Status: DISCONTINUED | OUTPATIENT
Start: 2018-09-26 | End: 2018-09-27

## 2018-09-26 RX ORDER — SODIUM CHLORIDE 9 MG/ML
INJECTION, SOLUTION INTRAVENOUS CONTINUOUS
Status: DISCONTINUED | OUTPATIENT
Start: 2018-09-26 | End: 2018-09-26 | Stop reason: HOSPADM

## 2018-09-26 RX ORDER — IOPAMIDOL 612 MG/ML
50 INJECTION, SOLUTION INTRAVASCULAR ONCE
Status: COMPLETED | OUTPATIENT
Start: 2018-09-26 | End: 2018-09-26

## 2018-09-26 RX ORDER — FENTANYL CITRATE 50 UG/ML
25-50 INJECTION, SOLUTION INTRAMUSCULAR; INTRAVENOUS EVERY 5 MIN PRN
Status: DISCONTINUED | OUTPATIENT
Start: 2018-09-26 | End: 2018-09-26 | Stop reason: HOSPADM

## 2018-09-26 RX ORDER — POTASSIUM CHLORIDE 29.8 MG/ML
20 INJECTION INTRAVENOUS
Status: DISCONTINUED | OUTPATIENT
Start: 2018-09-26 | End: 2018-09-27 | Stop reason: HOSPADM

## 2018-09-26 RX ORDER — LORAZEPAM 2 MG/ML
.5-1 INJECTION INTRAMUSCULAR EVERY 4 HOURS PRN
Status: DISCONTINUED | OUTPATIENT
Start: 2018-09-26 | End: 2018-09-26 | Stop reason: HOSPADM

## 2018-09-26 RX ORDER — POLYETHYLENE GLYCOL 3350 17 G/17G
17 POWDER, FOR SOLUTION ORAL DAILY PRN
Status: DISCONTINUED | OUTPATIENT
Start: 2018-09-26 | End: 2018-09-27 | Stop reason: HOSPADM

## 2018-09-26 RX ORDER — POTASSIUM CHLORIDE 1500 MG/1
20-40 TABLET, EXTENDED RELEASE ORAL
Status: DISCONTINUED | OUTPATIENT
Start: 2018-09-26 | End: 2018-09-27 | Stop reason: HOSPADM

## 2018-09-26 RX ORDER — LIDOCAINE 40 MG/G
CREAM TOPICAL
Status: DISCONTINUED | OUTPATIENT
Start: 2018-09-26 | End: 2018-09-26 | Stop reason: HOSPADM

## 2018-09-26 RX ORDER — POTASSIUM CHLORIDE 7.45 MG/ML
10 INJECTION INTRAVENOUS
Status: DISCONTINUED | OUTPATIENT
Start: 2018-09-26 | End: 2018-09-27 | Stop reason: HOSPADM

## 2018-09-26 RX ORDER — ONDANSETRON 4 MG/1
4 TABLET, ORALLY DISINTEGRATING ORAL EVERY 6 HOURS PRN
Status: DISCONTINUED | OUTPATIENT
Start: 2018-09-26 | End: 2018-09-28

## 2018-09-26 RX ORDER — LIDOCAINE HYDROCHLORIDE 10 MG/ML
1-30 INJECTION, SOLUTION EPIDURAL; INFILTRATION; INTRACAUDAL; PERINEURAL
Status: COMPLETED | OUTPATIENT
Start: 2018-09-26 | End: 2018-09-26

## 2018-09-26 RX ORDER — FLUMAZENIL 0.1 MG/ML
0.2 INJECTION, SOLUTION INTRAVENOUS
Status: DISCONTINUED | OUTPATIENT
Start: 2018-09-26 | End: 2018-09-26 | Stop reason: HOSPADM

## 2018-09-26 RX ADMIN — MIDAZOLAM HYDROCHLORIDE 2 MG: 1 INJECTION, SOLUTION INTRAMUSCULAR; INTRAVENOUS at 15:27

## 2018-09-26 RX ADMIN — ALTEPLASE 0.5 MG/HR: KIT at 16:21

## 2018-09-26 RX ADMIN — DIPHENHYDRAMINE HYDROCHLORIDE 50 MG: 50 INJECTION, SOLUTION INTRAMUSCULAR; INTRAVENOUS at 15:34

## 2018-09-26 RX ADMIN — SODIUM CHLORIDE: 9 INJECTION, SOLUTION INTRAVENOUS at 21:34

## 2018-09-26 RX ADMIN — LIDOCAINE HYDROCHLORIDE 2 ML: 10 INJECTION, SOLUTION INFILTRATION; PERINEURAL at 15:52

## 2018-09-26 RX ADMIN — SODIUM CHLORIDE: 9 INJECTION, SOLUTION INTRAVENOUS at 14:08

## 2018-09-26 RX ADMIN — LORAZEPAM 0.5 MG: 0.5 TABLET ORAL at 14:16

## 2018-09-26 RX ADMIN — MIDAZOLAM HYDROCHLORIDE 1 MG: 1 INJECTION, SOLUTION INTRAMUSCULAR; INTRAVENOUS at 15:49

## 2018-09-26 RX ADMIN — MIDAZOLAM HYDROCHLORIDE 1 MG: 1 INJECTION, SOLUTION INTRAMUSCULAR; INTRAVENOUS at 15:39

## 2018-09-26 RX ADMIN — HEPARIN SODIUM 10000 UNITS: 10000 INJECTION, SOLUTION INTRAVENOUS; SUBCUTANEOUS at 15:29

## 2018-09-26 RX ADMIN — FENTANYL CITRATE 50 MCG: 50 INJECTION INTRAMUSCULAR; INTRAVENOUS at 15:49

## 2018-09-26 RX ADMIN — FENTANYL CITRATE 100 MCG: 50 INJECTION INTRAMUSCULAR; INTRAVENOUS at 15:29

## 2018-09-26 RX ADMIN — POTASSIUM CHLORIDE 20 MEQ: 1500 TABLET, EXTENDED RELEASE ORAL at 22:11

## 2018-09-26 RX ADMIN — IOPAMIDOL 20 ML: 612 INJECTION, SOLUTION INTRAVENOUS at 16:02

## 2018-09-26 RX ADMIN — FENTANYL CITRATE 50 MCG: 50 INJECTION INTRAMUSCULAR; INTRAVENOUS at 15:39

## 2018-09-26 RX ADMIN — HEPARIN SODIUM 500 UNITS/HR: 10000 INJECTION, SOLUTION INTRAVENOUS at 16:22

## 2018-09-26 RX ADMIN — LORAZEPAM 0.5 MG: 0.5 TABLET ORAL at 13:42

## 2018-09-26 RX ADMIN — Medication: at 17:35

## 2018-09-26 ASSESSMENT — ACTIVITIES OF DAILY LIVING (ADL): ADLS_ACUITY_SCORE: 10

## 2018-09-26 NOTE — PROGRESS NOTES
Interventional Radiology Pre-Procedure Sedation Assessment   Time of Assessment: 2:34 PM    Expected Level: Moderate Sedation    Indication: Sedation is required for the following type of Procedure: Right upper arm venogram with possible thrombectomy, catheter directed lytics, and/or venoplasty    Sedation and procedural consent: Risks, benefits and alternatives were discussed with Patient and Parent(s)    PO Intake: Appropriately NPO for procedure    ASA Class: Class 1 - HEALTHY PATIENT    Mallampati: Grade 1:  Soft palate, uvula, tonsillar pillars, and posterior pharyngeal wall visible    Lungs: Lungs Clear with good breath sounds bilaterally    Heart: Normal heart sounds and rate    History and physical reviewed and no updates needed. I have reviewed the lab findings, diagnostic data, medications, and the plan for sedation. I have determined this patient to be an appropriate candidate for the planned sedation and procedure and have reassessed the patient IMMEDIATELY PRIOR to sedation and procedure.    Duyen Sahu, VANNESSA CNP

## 2018-09-26 NOTE — IP AVS SNAPSHOT
MRN:[id number]                      After Visit Summary   9/26/2018    Rickie Lagos    MRN: [id number]           Thank you!     Thank you for choosing [facility] for your care. Our goal is always to provide you with excellent care. Hearing back from our patients is one way we can continue to improve our services. Please take a few minutes to complete the written survey that you may receive in the mail after you visit with us. Thank you!        Patient Information     Date Of Birth          1990        Designated Caregiver       Most Recent Value    Caregiver    Will someone help with your care after discharge? no    Name of designated caregiver N/A - patient cares for self    Phone number of caregiver N/A - patient cares for self      About your hospital stay     You were admitted on:  September 26, 2018 You last received care in the:  [facility] [address] [facility]    You were discharged on:  October 6, 2018        Reason for your hospital stay       Recurrent thoracic outlet syndrome                  Who to Call     For medical emergencies, please call 911.  For non-urgent questions about your medical care, please call your primary care provider or clinic, [phone]  For questions related to your surgery, please call your surgery clinic        Attending Provider     Provider Specialty    Luther Moon MD Cardiology    Kong Madden MD Surgery       Primary Care Provider Office Phone # Fax #    Rickie Ruiz [phone]      After Care Instructions     Activity       OK to shower.  Let regular soap and water run over the incision.   Pat dry.  Okay for a clean gauze as needed for drainage.  Avoid submerging the incision for 4 weeks.      Avoid heavy lifting (less than 10 pounds) for 4 weeks.    Call if having a temperature of greater than 101.4 F, pain is not controlled by medications by mouth, unable to tolerate food or  stay hydrated, or incision is red, warm to touch, or has thick yellow drainage.            Activity       Your activity upon discharge: activity as tolerated. May shower.  Use incentive spirometer.            Diet       Normal diet as tolerated            Diet       Follow this diet upon discharge: Orders Placed This Encounter      Snacks/Supplements Adult: Boost Shake; Between Meals      Diet      Regular Diet Adult                  Follow-up Appointments     Follow-up and recommended labs and tests        Please call to setup a follow-up appointment in 2-3 weeks unless previously scheduled or otherwise specified.            Follow-up and recommended labs and tests        Follow up with me,  Kong Madden, within 1 week  . to evaluate after surgery.  The following labs/tests are recommended: Repeat right arm venogram in about 2 weeks..                  Further instructions from your care team       Discharge Instructions following   Thoracic Outlet Syndrome Surgery  [facility]    Incision Care    You will have an incision under your arm, approximately 3 inches in length.  There will be white strips of tape, called steri-strips, applied over the incision.  The ends will loosen after 5-7 days, at which time they can be removed.    Cover your incision with dry gauze and change as needed.    You may shower 2-3 days after your surgery - pat the incision dry to prevent irritation from moisture.      You may develop bruising and firmness near your incision.  This will soften and resolve over the next 1-3 weeks.  Call our office if it enlarges, becomes red, or painful.    On occasion a soft, fluid-filled bulge can develop near a surgical incision - this is called a seroma.  It will likely resolve on its own, but occasionally requires your doctor to drain it in clinic.  You should call our office if you have questions about this.    You may notice numbness around your  "incision.  This is due to nerve irritation from the surgery and will gradually improve over the next several weeks.    Diet    You may resume a regular diet before you leave the hospital.  You may find that it is best to try smaller, more frequent meals until your appetite returns.    Activity the first 1-2 weeks after surgery    You may drive a car 3-4 days after surgery and when you are off prescription pain pills.    You may return to work or school once you feel ready - typically within 1 week.    You should avoid strenuous activity, including running, biking, or weight-lifting more than 20 pounds.  You will be instructed on increasing your activity level at your post-operative appointment    You should use your arm for your normal routine after surgery - ie opening doors, using the computer, styling your hair etc.    When to call our office: [phone]    If you experience fever greater than 101    Redness and /or drainage from your incision    Shortness of breath    Any concerns regarding your recovery     Post-operative appointment :  Date: ___________________________  Time: __________________________  Location: [facility], [address] [place] [address], [address]    Revised 5/2014                    Pending Results     No orders found from 9/24/2018 to 9/27/2018.            Admission Information     Date & Time Provider Department Dept. Phone    9/26/2018 Kong Madden MD [facility] [address] [facility] [phone]      Your Vitals Were     Blood Pressure Pulse Temperature Respirations Height Weight    118/70 (BP Location: Left arm) 87 98.2  F (36.8  C) (Oral) 16 1.93 m (6' 4\") 69.2 kg (152 lb 8.9 oz)    Pulse Oximetry BMI (Body Mass Index)                97% 18.57 kg/m2          [facility] Information     [employer] lets you send messages to your doctor, view your test results, renew your prescriptions, schedule appointments and more. To sign up, go to [email or web address] " "Click on \"Log in\" on the left side of the screen, which will take you to the Welcome page. Then click on \"Sign up Now\" on the right side of the page.     You will be asked to enter the access code listed below, as well as some personal information. Please follow the directions to create your username and password.     Your access code is: [email or web address]  Expires: 12/15/2018  2:56 PM     Your access code will  in 90 days. If you need help or a new code, please call your [facility] clinic or [phone].        Care EveryWhere ID     This is your Care EveryWhere ID. This could be used by other organizations to access your [facility] medical records  [id number]        Equal Access to Services     LIVIA DASH : Moris Marroquin, lizette hdz, william albaalwilliam blank, lashonda houston . [facility] [phone].    ATENCIÓN: Si habla español, tiene a tariq disposición servicios gratuitos de asistencia lingüística. Llame al [phone].    We comply with applicable federal civil rights laws and [place] laws. We do not discriminate on the basis of race, color, national origin, age, disability, sex, sexual orientation, or gender identity.               Review of your medicines      START taking        Dose / Directions    ferrous sulfate 325 (65 Fe) MG tablet   Commonly known as:  IRON   Used for:  Anemia due to blood loss, acute        Dose:  325 mg   Take 1 tablet (325 mg) by mouth 2 times daily   Quantity:  100 tablet   Refills:  0       methocarbamol 750 MG tablet   Commonly known as:  ROBAXIN        Dose:  750 mg   Take 1 tablet (750 mg) by mouth 4 times daily as needed for muscle spasms   Quantity:  12 tablet   Refills:  0       oxyCODONE IR 5 MG tablet   Commonly known as:  ROXICODONE        Dose:  5-10 mg   Take 1-2 tablets (5-10 mg) by mouth every 4 hours as needed for moderate to severe pain   Quantity:  30 tablet   Refills:  0       traMADol 50 MG tablet   Commonly known " as:  ULTRAM   Notes to Patient:  Do not take if also taking Norco or oxycodone        Dose:  50 mg   Take 1 tablet (50 mg) by mouth every 6 hours as needed for moderate to severe pain or severe pain   Quantity:  10 tablet   Refills:  0         CONTINUE these medicines which have NOT CHANGED        Dose / Directions    HYDROcodone-acetaminophen 5-325 MG per tablet   Commonly known as:  NORCO   Used for:  Acute thrombosis of right subclavian vein (H)   Notes to Patient:  Do not take if also taking oxycodone. May take one or the another.         Dose:  1 tablet   Take 1 tablet by mouth every 6 hours as needed for severe pain Every 4 hours to every 6 hours as needed   Quantity:  12 tablet   Refills:  0       LORazepam 0.5 MG tablet   Commonly known as:  ATIVAN   Used for:  Situational anxiety        Dose:  0.5-1 mg   Take 1-2 tablets (0.5-1 mg) by mouth every 8 hours as needed (taken 1 hour before the procedure) Do not drive after taking this medication   Quantity:  5 tablet   Refills:  0       rivaroxaban ANTICOAGULANT 15 MG Tabs tablet   Commonly known as:  XARELTO   Used for:  Acute thrombosis of right subclavian vein (H)   Notes to Patient:  Start tomorrow, 10/7/18        Dose:  15 mg   Take 1 tablet (15 mg) by mouth 2 times daily (with meals)   Quantity:  42 tablet   Refills:  0            Where to get your medicines      These medications were sent to [facility] [place], [place] - [address]  [address], [place] [address]     Phone:  [phone]     ferrous sulfate 325 (65 Fe) MG tablet    methocarbamol 750 MG tablet         Some of these will need a paper prescription and others can be bought over the counter. Ask your nurse if you have questions.     Bring a paper prescription for each of these medications     oxyCODONE IR 5 MG tablet    traMADol 50 MG tablet                Protect others around you: Learn how to safely use, store and throw away your medicines at  www.disposemymeds.org.        Information about OPIOIDS     PRESCRIPTION OPIOIDS: WHAT YOU NEED TO KNOW   We gave you an opioid (narcotic) pain medicine. It is important to manage your pain, but opioids are not always the best choice. You should first try all the other options your care team gave you. Take this medicine for as short a time (and as few doses) as possible.    Some activities can increase your pain, such as bandage changes or therapy sessions. It may help to take your pain medicine 30 to 60 minutes before these activities. Reduce your stress by getting enough sleep, working on hobbies you enjoy and practicing relaxation or meditation. Talk to your care team about ways to manage your pain beyond prescription opioids.    These medicines have risks:    DO NOT drive when on new or higher doses of pain medicine. These medicines can affect your alertness and reaction times, and you could be arrested for driving under the influence (DUI). If you need to use opioids long-term, talk to your care team about driving.    DO NOT operate heavy machinery    DO NOT do any other dangerous activities while taking these medicines.    DO NOT drink any alcohol while taking these medicines.     If the opioid prescribed includes acetaminophen, DO NOT take with any other medicines that contain acetaminophen. Read all labels carefully. Look for the word  acetaminophen  or  Tylenol.  Ask your pharmacist if you have questions or are unsure.    You can get addicted to pain medicines, especially if you have a history of addiction (chemical, alcohol or substance dependence). Talk to your care team about ways to reduce this risk.    All opioids tend to cause constipation. Drink plenty of water and eat foods that have a lot of fiber, such as fruits, vegetables, prune juice, apple juice and high-fiber cereal. Take a laxative (Miralax, milk of magnesia, Colace, Senna) if you don t move your bowels at least every other day. Other side  effects include upset stomach, sleepiness, dizziness, throwing up, tolerance (needing more of the medicine to have the same effect), physical dependence and slowed breathing.    Store your pills in a secure place, locked if possible. We will not replace any lost or stolen medicine. If you don t finish your medicine, please throw away (dispose) as directed by your pharmacist. The [place] Pollution [employer] has more information about safe disposal: https://www.pca.[facility].us/living-green/managing-unwanted-medications             Medication List: This is a list of all your medications and when to take them. Check marks below indicate your daily home schedule. Keep this list as a reference.      Medications           Morning Afternoon Evening Bedtime As Needed    ferrous sulfate 325 (65 Fe) MG tablet   Commonly known as:  IRON   Take 1 tablet (325 mg) by mouth 2 times daily   Last time this was given:  325 mg on 10/6/2018  9:05 AM                                      HYDROcodone-acetaminophen 5-325 MG per tablet   Commonly known as:  NORCO   Take 1 tablet by mouth every 6 hours as needed for severe pain Every 4 hours to every 6 hours as needed   Notes to Patient:  Do not take if also taking oxycodone. May take one or the another.                                 LORazepam 0.5 MG tablet   Commonly known as:  ATIVAN   Take 1-2 tablets (0.5-1 mg) by mouth every 8 hours as needed (taken 1 hour before the procedure) Do not drive after taking this medication   Last time this was given:  0.5 mg on 10/3/2018 11:29 AM                                   methocarbamol 750 MG tablet   Commonly known as:  ROBAXIN   Take 1 tablet (750 mg) by mouth 4 times daily as needed for muscle spasms   Last time this was given:  750 mg on 10/5/2018 10:09 PM                                            oxyCODONE IR 5 MG tablet   Commonly known as:  ROXICODONE   Take 1-2 tablets (5-10 mg) by mouth every 4 hours as needed for moderate to  severe pain   Last time this was given:  10 mg on 10/6/2018  6:22 AM                                   rivaroxaban ANTICOAGULANT 15 MG Tabs tablet   Commonly known as:  XARELTO   Take 1 tablet (15 mg) by mouth 2 times daily (with meals)   Notes to Patient:  Start tomorrow, 10/7/18                                      traMADol 50 MG tablet   Commonly known as:  ULTRAM   Take 1 tablet (50 mg) by mouth every 6 hours as needed for moderate to severe pain or severe pain   Last time this was given:  100 mg on 9/30/2018  7:40 AM   Notes to Patient:  Do not take if also taking Norco or oxycodone                                          More Information        Understanding Thoracic Outlet Syndrome  Thoracic outlet syndrome is a set of symptoms in the shoulder, arm, or hand. It occurs from a narrowing of the thoracic outlet. This is the space between your collarbone and your first rib. It can result from injury, disease, or a problem present from birth. Thoracic outlet syndrome is not common. It can occur in people of any age.  What is the thoracic outlet?  The thoracic outlet is a narrow space between your collarbone (clavicle) and your first rib. Nerves and blood vessels pass from your chest to your arm through this area. The nerves and blood vessels include:    A bundle of nerves that serve your shoulder, arm, and hand    A large and important vein (subclavian vein)    A very large important artery (subclavian artery)  What causes thoracic outlet syndrome?  Your shoulder muscles normally keep your clavicle raised and in place. But with thoracic outlet syndrome, the upper rib and clavicle are closer. This makes the thoracic outlet smaller. Nerves and blood vessels going through the area can be compressed. Conditions that can cause thoracic outlet syndrome include:    Having an extra rib at birth    An abnormality in the neck muscles at birth    Neck injury    Injury to the first rib or collarbone    Repetitive overhead arm  movements that may cause inflammatory changes  Poor posture and obesity may raise your risk for thoracic outlet syndrome. People who do repetitive overhead arm movements, such as swimmers or pitchers, may also have a higher risk.  Symptoms of thoracic outlet syndrome  Your symptoms may come and go. This may be partly based on your activity level. Overhead activities may make your symptoms worse. You most likely have symptoms on only 1 side. In some cases thoracic outlet syndrome can cause symptoms on both sides of the body.  Symptoms can include:    Aching in your neck, shoulder, arm, or hand    Pain, numbness, or tingling of your forearm or fingers    Hand weakness    Limited range of motion of your arm    A depression in your shoulder    Pain in your neck muscles    Swelling and redness of your arm    Pale and cool arm and hand  Diagnosing thoracic outlet syndrome  Thoracic outlet syndrome is often more difficult to diagnose than other shoulder problems. Your healthcare provider will ask about your health history and your symptoms. You will also have a physical exam. Your healthcare provider may move your hand and arm in different positions.  A specific test may be done to help diagnose thoracic outlet syndrome. Your healthcare provider may have you raise your arms and then open and close your fist for a few minutes. This often brings on symptoms.  You may have other tests, such as:    Nerve conduction tests. This is done to see how your nerves are affected.    Doppler ultrasound. This looks at blood flow through your arm and hand.    Chest X-ray. This is done to check for abnormal bone such as an extra rib.    CT scan. This may be done if your healthcare provider needs to see more detail.    CT angiography. This is done to get more information about blood flow through your arm.  Date Last Reviewed: 6/1/2017 2000-2017 [employer]. [address] [place] [address], [place] [address]. All rights reserved.  This information is not intended as a substitute for professional medical care. Always follow your healthcare professional's instructions.                Treatment for Thoracic Outlet Syndrome  Thoracic outlet syndrome is a set of symptoms in the shoulder, arm, or hand. It occurs from a narrowing of the thoracic outlet. This is the space between your collarbone and your first rib. It can result from injury, disease, or a problem present from birth. Thoracic outlet syndrome is not common. It can occur in people of any age.   Types of treatment  Treatments for thoracic outlet syndrome may be nonsurgical (conservative) or surgical, and may include:    Physical therapy to help strengthen shoulder muscles, improve posture, and enlarge the thoracic outlet space    Over-the-counter pain medicine to relieve pain and swelling    Loss of excess weight    Changes to daily activities that bring on symptoms    Botulinum toxin shots (injections)    Surgical decompression, which may be removing a part of a muscle (anterior scalene), or the first rib, or a fibrous band    Other treatments, when the condition is caused by a tumor (sometimes called Pancoast syndrome)  Possible complications of thoracic outlet syndrome  In some cases thoracic outlet syndrome can cause a blood clot to form in a vein in your arm. This blocks blood flow and may make your arm very swollen. The clot may also move to the lungs and cause a pulmonary embolism. This is a blood clot in an artery in your lung. Or a clot can move somewhere else in your body. You may need to take blood-thinner medicine to prevent clotting. You may need a procedure to remove the clot using a thin tube (catheter) inserted through a vein.  A blood clot may also form in one of the arteries of your arm. This may cause a sudden decrease in blood flow to your arm. The clot may be treated with blood thinners or a catheter inserted through an artery. In some cases, surgery may be done to  remove the clot.  Living with thoracic outlet syndrome  There are things you can do to help prevent symptoms. Don t put heavy bags over your shoulder. This increases pressure on the thoracic outlet. Also practice your physical therapy exercises. This will help keep your shoulder muscles strong.  You may have a poor response to nonsurgical treatment and a good response to Botulinum toxin shots. In that case, you may be a good candidate for surgical decompression.  When to call your healthcare provider  Call your healthcare provider right away if you have any of these:    Arm or hand that is suddenly cool, lighter in color, or swollen    Sudden weakness of your hand    Symptoms that don t get better with therapy   Date Last Reviewed: 12/1/2017 2000-2017 [employer]. [address] [place] [address], [place] [address]. All rights reserved. This information is not intended as a substitute for professional medical care. Always follow your healthcare professional's instructions.

## 2018-09-26 NOTE — PROGRESS NOTES
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  He underwent successful lytic therapy with only mild extrinsic compression.  He was discharged on 9/18/2018 on Xarelto.    He has had no swelling or arm pain since his discharge and has been taking his medications.  He has been avoiding any excessive overhead activities.  We had planned for a right transaxillary first rib resection-scalenectomy for the end of next week.      Patient saw Dr. Moon in the office this morning.  Duplex was performed which revealed reocclusion of the right subclavian vein with occlusive thrombus along with some thrombus in the brachial vein.  Due to this patient will be readmitted and undergo repeat right arm venogram with lytic therapy.    Exam: Patient was seen in the care suites.  He is here with his parents.             Alert and appropriate.             Chest = clear.   Cardiovascular=RR            No significant right arm swelling noted.             Normal CMS.      Hemoglobin= 14.5   serum creatinine= 0.86    Impression: Reocclusion of the right axillary/subclavian vein despite being on appropriate anticoagulation with Pradaxa.  This is secondary to thoracic outlet syndrome and we had planned his slightly delay in the surgical decompression due to his hypercoagulable state at the time of the thrombosis.  Now with this rethrombosis we plan a repeat venogram today and lytic therapy.  Patient will be placed on full dose heparin intravenously.  Tentatively plan surgical decompression on 9-28-18.  This is been discussed with the patient and his family.       Kong Madden MD

## 2018-09-26 NOTE — MR AVS SNAPSHOT
After Visit Summary   9/26/2018    Rickie Lagos    MRN: [id number]           Patient Information     Date Of Birth          1990        Visit Information        Provider Department      9/26/2018 12:10 PM Luther Moon MD [facility]  Vascular Center      Today's Diagnoses     Pre-op exam    -  1    Preop general physical exam          Care Instructions      Before Your Surgery      Call your surgeon if there is any change in your health. This includes signs of a cold or flu (such as a sore throat, runny nose, cough, rash or fever).    Do not smoke, drink alcohol or take over the counter medicine (unless your surgeon or primary care doctor tells you to) for the 24 hours before and after surgery.    If you take prescribed drugs: Follow your doctor s orders about which medicines to take and which to stop until after surgery.    Eating and drinking prior to surgery: follow the instructions from your surgeon    Take a shower or bath the night before surgery. Use the soap your surgeon gave you to gently clean your skin. If you do not have soap from your surgeon, use your regular soap. Do not shave or scrub the surgery site.  Wear clean pajamas and have clean sheets on your bed.           Follow-ups after your visit        Your next 10 appointments already scheduled     Sep 26, 2018  2:30 PM CDT   (Arrive by 1:00 PM)   IR UPPER EXTREMITY VENOGRAM RIGHT with SHIR1   [facility] ([facility])    [address] [place] [id number] [phone]           The day before the exam:   You may eat your regular diet.   You are encouraged to drink at least 8 eight ounce glasses of clear liquids.   Drink no alcoholic beverages for 24 hours before or after the exam.  The day of the exam:   Do not eat any solid food or milk products for 6 hours prior to the exam. You may drink clear liquids  until 2 hours prior to the exam. Clear liquids include the following: water, Jell-O, clear broth, apple juice or any non-carbonated drink that you can see through (no pop!)   The morning of the exam you may take medications as directed with a sip of water.  You should not have received barium (x-ray contrast) within 48 hours of this exam.  Please wear loose clothing, such as a sweat suit or jogging clothes. Avoid snaps, zippers and other metal. We may ask you to undress and put on a hospital gown.  Please bring any scans or X-rays taken at other hospitals, if similar tests were done. Also bring a list of your medicines, including vitamins, minerals and over-the-counter drugs. It is safest to leave personal items at home.  Someone will need to drive you to and from the hospital.  Tell your doctor in advance:   If you have allergies to x-ray contrast or iodine.   If you are or may be pregnant.   If you are taking Coumadin (or any other blood thinners) 5 days prior to the exam for any special instructions.   If you are diabetic to determine if your insulin needs have to be adjusted for the exam.  Your doctor will:   Need to do a history and physical within 30 days before this procedure.   Obtain necessary laboratory tests prior to the exam (creatinine, Hgb/Hct, platelet count, and INR).  Following the exam:   The possibility exists that you may need to remain at the hospital for 2-4 hours post procedure.   If you were given sedation, you cannot drive for 24 hours after the procedure, and an adult must be with you until then.  If you have any questions, please call the Imaging Department where you will have your exam. Directions, parking instructions, and other information are available on our website, [email or web address].            Oct 05, 2018   Procedure with Kong Madden MD   [facility] (--)    [address]  [facility] [address] [phone]              Future  tests that were ordered for you today     Open Standing Orders        Priority Remaining Interval Expires Ordered    INR STAT 1/1 STAT  9/26/2018    Partial thromboplastin time STAT 1/1 STAT  9/26/2018    Obtain affirmation of informed consent Routine 1/1 ONE TIME  9/26/2018    Vital signs Routine 1/1 UPON ADMISSION  9/26/2018    Patient care order Routine 1/1 EFFECTIVE NOW  9/26/2018    Patient education Routine 1/1 EFFECTIVE NOW  9/26/2018    Assess: IV contrast safety assessment Routine 1/1 EFFECTIVE NOW  9/26/2018    Initiate: Contrast Nephropathy Protection Order Routine 1/1 EFFECTIVE NOW  9/26/2018    Peripheral IV: Standard Routine 1/1 EFFECTIVE NOW  9/26/2018    Notify Radiologists  Routine 1/1 EFFECTIVE NOW  9/26/2018    Notify Provider Routine 1/1 EFFECTIVE NOW  9/26/2018    Glucose monitor nursing POCT Routine 1/1 PRN  9/26/2018    Activity: Up ad rupert Routine 1/1 EFFECTIVE NOW  9/26/2018    NPO for Medical/Clinical Reasons Except for: Other; Specify: 4 hours prior to procedure. Routine 1/1 DIET EFFECTIVE NOW  9/26/2018    CBC with platelets Routine 1/1 STAT  9/26/2018    Creatinine With GFR Routine 1/1 STAT  9/26/2018            Who to contact     If you have questions or need follow up information about today's clinic visit or your schedule please contact [facility] directly at [phone].  Normal or non-critical lab and imaging results will be communicated to you by [employer], letter or phone within 4 business days after the clinic has received the results. If you do not hear from us within 7 days, please contact the clinic through [employer] or phone. If you have a critical or abnormal lab result, we will notify you by phone as soon as possible.  Submit refill requests through [employer] or call your pharmacy and they will forward the refill request to us. Please allow 3 business days for your refill to be completed.          Additional Information About Your Visit        [employer]  "Information     [employer] lets you send messages to your doctor, view your test results, renew your prescriptions, schedule appointments and more. To sign up, go to [email or web address] . Click on \"Log in\" on the left side of the screen, which will take you to the Welcome page. Then click on \"Sign up Now\" on the right side of the page.     You will be asked to enter the access code listed below, as well as some personal information. Please follow the directions to create your username and password.     Your access code is: [email or web address]  Expires: 12/15/2018  2:56 PM     Your access code will  in 90 days. If you need help or a new code, please call your [facility] clinic or [phone].        Care EveryWhere ID     This is your [facility] EveryWhere ID. This could be used by other organizations to access your [facility] medical records  [id number]        Your Vitals Were     Pulse Height Pulse Oximetry BMI (Body Mass Index)          81 6' 4\" (1.93 m) 100% 21.03 kg/m2         Blood Pressure from Last 3 Encounters:   18 120/77   18 116/63   18 135/82    Weight from Last 3 Encounters:   18 172 lb 12.8 oz (78.4 kg)   18 169 lb 4.8 oz (76.8 kg)   18 169 lb (76.7 kg)              We Performed the Following     EKG 12-lead complete w/read - Clinics        Primary Care Provider Office Phone # Fax #    Rickie Ruiz [phone] [address]  [place] [address]        Equal Access to Services     [facility] HARDY AH: Hadii erika Marroquin, walonnyda lujono, qaybta kaalmada abhay, lashonda miller. So [facility] [phone].    ATENCIÓN: Si habla español, tiene a tariq disposición servicios gratuitos de asistencia lingüística. Llame al [phone].    We comply with applicable federal civil rights laws and [place] laws. We do not discriminate on the basis of race, color, national origin, age, disability, sex, sexual " orientation, or gender identity.            Thank you!     Thank you for choosing [facility] [place]  for your care. Our goal is always to provide you with excellent care. Hearing back from our patients is one way we can continue to improve our services. Please take a few minutes to complete the written survey that you may receive in the mail after your visit with us. Thank you!             Your Updated Medication List - Protect others around you: Learn how to safely use, store and throw away your medicines at www.disposemymeds.org.          This list is accurate as of 9/26/18  1:21 PM.  Always use your most recent med list.                   Brand Name Dispense Instructions for use Diagnosis    HYDROcodone-acetaminophen 5-325 MG per tablet    NORCO    12 tablet    Take 1 tablet by mouth every 6 hours as needed for severe pain Every 4 hours to every 6 hours as needed    Acute thrombosis of right subclavian vein (H)       LORazepam 0.5 MG tablet    ATIVAN    5 tablet    Take 1-2 tablets (0.5-1 mg) by mouth every 8 hours as needed (taken 1 hour before the procedure) Do not drive after taking this medication    Situational anxiety       rivaroxaban ANTICOAGULANT 15 MG Tabs tablet    XARELTO    42 tablet    Take 1 tablet (15 mg) by mouth 2 times daily (with meals)    Acute thrombosis of right subclavian vein (H)

## 2018-09-26 NOTE — PLAN OF CARE
Problem: Patient Care Overview  Goal: Plan of Care/Patient Progress Review  Outcome: No Change  Pt arrived to the unit at 1645. A/O x 4. Neuros intact. CMS intact. Pulses positive. TPA and heparin in place. On bedrest. Tolerating clear liquid diet. PCA placed. Family at the bedside. Questions answered. Will continue to monitor.

## 2018-09-26 NOTE — NURSING NOTE
"Rickie Lagos is a 27 year old male who presents for:  Chief Complaint   Patient presents with     RECHECK     Right arm venous u/s ([facility] 11:00; CAF 12:10) *TOS, Right axillary and subclavian vein DVT s/p lysis - follow-up prior to 1st rib resection.[facility] 09/19/18        Vitals:    There were no vitals filed for this visit.    BMI:  Estimated body mass index is 20.61 kg/(m^2) as calculated from the following:    Height as of 9/21/18: 1.93 m (6' 4\").    Weight as of 9/21/18: 76.8 kg (169 lb 4.8 oz).    Pain Score:  Data Unavailable        Dariel Vera    "

## 2018-09-26 NOTE — NURSING NOTE
Patient was walked down to check in desk for care suite/  Interventional radiology.  Geovanna Miller RN, BSN

## 2018-09-26 NOTE — PROGRESS NOTES
[facility]  [address]  [place] [address]  [phone]  Dept: [phone]    PRE-OP EVALUATION:  Today's date: 2018    Rickie Reji Lagos (: 1990) presents for pre-operative evaluation assessment as requested by Kong Rocha MD.  He requires evaluation and anesthesia risk assessment prior to undergoing surgery/procedure for treatment of RIGHT TRANSAXILLARY FIRST RIB RESECTION, SCALENECTOMY  .    Proposed Surgery/ Procedure: *RIGHT TRANSAXILLARY FIRST RIB RESECTION, SCALENECTOMY   Date of Surgery/ Procedure: 10/5/2018  Time of Surgery/ Procedure: 12:45PM  Hospital/Surgical Facility: [facility]  Fax number for surgical facility:   Primary Physician: Rickie Ruiz  Type of Anesthesia Anticipated: General    Patient has a Health Care Directive or Living Will:  NO    1. NO - Do you have a history of heart attack, stroke, stent, bypass or surgery on an artery in the head, neck, heart or legs?  2. yes - Do you ever have any pain or discomfort in your chest?  3. NO - Do you have a history of  Heart Failure?  4. NO - Are you troubled by shortness of breath when: walking on the level, up a slight hill or at night?  5. NO - Do you currently have a cold, bronchitis or other respiratory infection?  6. NO - Do you have a cough, shortness of breath or wheezing?  7. NO - Do you sometimes get pains in the calves of your legs when you walk?  8.yes - Do you or anyone in your family have previous history of blood clots?  9. NO - Do you or does anyone in your family have a serious bleeding problem such as prolonged bleeding following surgeries or cuts?  10. NO - Have you ever had problems with anemia or been told to take iron pills?  11. NO - Have you had any abnormal blood loss such as black, tarry or bloody stools, or abnormal vaginal bleeding?  12. NO - Have you ever had a blood transfusion?  13. NO - Have you or any of your relatives ever had  problems with anesthesia?  14. NO - Do you have sleep apnea, excessive snoring or daytime drowsiness?  15. NO - Do you have any prosthetic heart valves?  16. NO - Do you have prosthetic joints?  17. NO - Is there any chance that you may be pregnant?      HPI:     HPI related to upcoming procedure: The patient is a 27 year old right handed non smoking male who was found to have RUE DVT. CD lysis was undertaken 9-16 to  with resolution of thrombus. TOS anatomy was seen. The patient was anticoagulated with Xarelto and is presenting for elective definitive surgical treatment with first rib resection and scalenectomy      See problem list for active medical problems.  Problems all longstanding and stable, except as noted/documented.  See ROS for pertinent symptoms related to these conditions.                                                                                                                                                          .    MEDICAL HISTORY:   There are no active problems to display for this patient.     Past Medical History:   Diagnosis Date     DVT of axillary vein, acute right (H) 09/16/2018     TOS (thoracic outlet syndrome) 09/16/2018     Past Surgical History:   Procedure Laterality Date     HC TOOTH EXTRACTION W/FORCEP Bilateral     wisdom tooth extraction     XR EXTREMITY VENOGRAM INJ RIGHT Right 09/16/2018     Current Outpatient Prescriptions   Medication Sig Dispense Refill     HYDROcodone-acetaminophen (NORCO) 5-325 MG per tablet Take 1 tablet by mouth every 6 hours as needed for severe pain Every 4 hours to every 6 hours as needed 12 tablet 0     LORazepam (ATIVAN) 0.5 MG tablet Take 1-2 tablets (0.5-1 mg) by mouth every 8 hours as needed (taken 1 hour before the procedure) Do not drive after taking this medication 5 tablet 0     rivaroxaban ANTICOAGULANT (XARELTO) 15 MG TABS tablet Take 1 tablet (15 mg) by mouth 2 times daily (with meals) 42 tablet 0     clindamycin (CLEOCIN)  "300 MG capsule Take 300 mg by mouth 4 times daily       OTC products: none    Allergies   Allergen Reactions     Amoxicillin Rash      Latex Allergy: NO    Social History   Substance Use Topics     Smoking status: Current Every Day Smoker     Types: Other     Smokeless tobacco: Never Used     Alcohol use Yes     History   Drug Use No       REVIEW OF SYSTEMS:   CONSTITUTIONAL: NEGATIVE for fever, chills, change in weight  INTEGUMENTARY/SKIN: NEGATIVE for worrisome rashes, moles or lesions  EYES: NEGATIVE for vision changes or irritation  ENT/MOUTH: NEGATIVE for ear, mouth and throat problems  RESP: NEGATIVE for significant cough or SOB  BREAST: NEGATIVE for masses, tenderness or discharge  CV: NEGATIVE for chest pain, palpitations or peripheral edema  GI: NEGATIVE for nausea, abdominal pain, heartburn, or change in bowel habits  : NEGATIVE for frequency, dysuria, or hematuria  MUSCULOSKELETAL: NEGATIVE for significant arthralgias or myalgia  NEURO: NEGATIVE for weakness, dizziness or paresthesias  ENDOCRINE: NEGATIVE for temperature intolerance, skin/hair changes  HEME: NEGATIVE for bleeding problems  PSYCHIATRIC: NEGATIVE for changes in mood or affect    EXAM:   /77 (BP Location: Right arm, Patient Position: Sitting, Cuff Size: Adult Regular)  Pulse 81  Ht 6' 4\" (1.93 m)  Wt 172 lb 12.8 oz (78.4 kg)  SpO2 100%  BMI 21.03 kg/m2    GENERAL APPEARANCE: healthy, alert and no distress     EYES: EOMI,  PERRL     HENT: ear canals and TM's normal and nose and mouth without ulcers or lesions     NECK: no adenopathy, no asymmetry, masses, or scars and thyroid normal to palpation     RESP: lungs clear to auscultation - no rales, rhonchi or wheezes     CV: regular rates and rhythm, normal S1 S2, no S3 or S4 and no murmur, click or rub     ABDOMEN:  soft, nontender, no HSM or masses and bowel sounds normal     MS: extremities normal- no gross deformities noted, no evidence of inflammation in joints, FROM in all " extremities.     SKIN: no suspicious lesions or rashes     NEURO: Normal strength and tone, sensory exam grossly normal, mentation intact and speech normal     PSYCH: mentation appears normal. and affect normal/bright     LYMPHATICS: No cervical adenopathy    DIAGNOSTICS:   EKG: Normal Sinus Rhythm, normal axis, normal intervals, no acute ST/T changes c/w ischemia, no LVH by voltage criteria    Recent Labs   Lab Test  09/19/18   0520  09/18/18   0625  09/17/18   0719  09/16/18   1720   HGB  14.4  14.4   --   14.8   PLT  194  207   --   280   NA   --    --    --   138   POTASSIUM   --    --   4.5  3.3*   CR  0.83  0.82   --   0.92        IMPRESSION:   Reason for surgery/procedure: TOS  Diagnosis/reason for consult: RUE DVT    The proposed surgical procedure is considered LOW risk.    REVISED CARDIAC RISK INDEX  The patient has the following serious cardiovascular risks for perioperative complications such as (MI, PE, VFib and 3  AV Block):  No serious cardiac risks  INTERPRETATION: 0 risks: Class I (very low risk - 0.4% complication rate)    The patient has the following additional risks for perioperative complications:  No identified additional risks      ICD-10-CM    1. Pre-op exam Z01.818 EKG 12-lead complete w/read - Clinics   2. Preop general physical exam Z01.818        RECOMMENDATIONS:     --Please do not consult hospital rounder / IM to assist post-op medical management, rather please consult Vascular Medicine to assist with postoperative medical care.    --Patient is to take all scheduled medications on the day of surgery EXCEPT for modifications listed below.    Anticoagulant or Antiplatelet Medication Use  XARELTO: Bleeding risk is at least moderate for this procedure and rivaroxaban (Xarelto) should be stopped 36  hours prior to procedure.        Repeat venus duplex reveals reoccluded right subclavian vein. The patient will be readmitted. CD lysis will be reinitiated, and the patient will have first rib  resection expedited post CD lysis.Of note, the patient has had a TTE as well as a thrombophilia evaluation ordered. This RUE DVT is entirely due to TOS. His phospholipid Abs will likely be falsely abnormal due to Xarelto use.        Signed Electronically by: Luther Moon MD    Copy of this evaluation report is provided to requesting physician.    [facility] Preop Guidelines    Revised Cardiac Risk Index

## 2018-09-26 NOTE — H&P
Admitted:     09/26/2018      PRIMARY PHYSICIAN:  Rickie Ruiz MD       IMPRESSION:      Recurrent right upper extremity deep venous thrombosis due to thoracic outlet syndrome rapidly recurring despite 100% compliance with therapeutic anticoagulation in the form of Xarelto 1 week after successful catheter-directed thrombolysis was undertaken.        The patient had catheter-directed thrombolysis recently undertaken of his first lifetime right upper extremity DVT.  The patient was successfully lysed.  He is scheduled for elective right first rib resection on 10/05/2018.  He presented to my office today for preop examination.  Repeat right upper extremity duplex revealed reocclusion of the patient's right subclavian vein.  His axillary vein is noted to have flow within it.  He was noted to have nonocclusive thrombus in his right basilic vein associated with his prior vascular sheath.  At present, I recommend the following:        a.  Readmit for repeat catheter-directed thrombolysis.    b.  Undertake expedited first rib resection, anterior scalenectomy post lysis.  With the patient being therapeutically anticoagulated, he has clinically re-occluded despite therapeutic anticoagulation.   c.  The patient is advised to stop using vapor-inhaled nicotine products.      CHIEF COMPLAINT:  Presentation for preop exam.       HISTORY OF PRESENT ILLNESS:  The patient is a 27-year-old white male with a first lifetime DVT having been noted on 09/19/2018.  Catheter-directed thrombolysis was undertaken.  Anatomical findings consistent with thoracic outlet syndrome were noted.  The patient was scheduled to undergo elective first rib resection and anterior scalenectomy on 10/05/2018.  He presented for a preop examination in my office today.  Repeat duplex was undertaken.  This revealed unfortunate reocclusion of the subclavian vein along with basilic vein thrombosis from his prior sheath.      REVIEW OF SYSTEMS:  The patient is  very anxious pertaining to needles.  He is asymptomatic from his recurrent occlusion of his subclavian vein.  He has no sensation of arm or facial or neck engorgement.  He denies chest pain, shortness of breath, nausea, vomiting or diarrhea. The remainder of his fourteen point review of systems is WNL.     PREVIOUS MEDICAL HISTORY:   1.  Thoracic outlet syndrome.   2.  Deep venous thrombosis, axillary vein 09/16/2018 status post catheter-directed thrombolysis, was discharged from the hospital 09/19/2019.   3.  Dental extraction with all 4 wisdom teeth being extracted approximately 1 week prior to 09/16/2018.        FAMILY HISTORY:  Notable for second-degree relatives who had DVT; however, no one has had thoracic outlet syndrome.       CURRENT MEDICATIONS:     1.  Xarelto 15 mg p.o. b.i.d.   2.  Norco p.r.n.   3.  Ativan p.r.n.      ALLERGIES:  AMOXICILLIN CAUSES A RASH.        PHYSICAL EXAMINATION:   VITAL SIGNS:  Blood pressure is 120/77, respiratory rate is 12, pulse is 81, and pulse ox is 100% on room air.   HEENT:  Oropharynx is within normal limits.   NECK:  No JVD, thyromegaly, lymphadenopathy, carotid bruits.   LUNGS:  Clear to auscultation bilaterally without rales, wheezes or rhonchi.   HEART:  Regular rate and rhythm, normal S1, S2.  No S3, S4, murmur, gallop or rub.   ABDOMEN:  Normoactive bowel sounds, soft, nondistended, nontender.   NEUROLOGIC:  Exam nonfocal.   EXTREMITIES:  Without cyanosis, clubbing or edema.  There is no anterior chest wall venous engorgement noted.   PSYCHIATRIC:  The patient is anxious and frustrated with the news of his reocclusion.        IMAGING AND LABORATORY DATA:  Right upper extremity ultrasound reveals subclavian occlusion, basilic vein occlusive thrombus.  CBC with diff, INR, PTT, and basic metabolic panel are currently pending.       ASSESSMENT AND PLAN:  Please see the above.         ARTHUR BAXTER MD             D: 09/26/2018   T: 09/26/2018   MT: LAW      Name:      ERLIN LIN   MRN:      [id number]        Account:      [id number]   :      1990        Admitted:     2018                   Document: [id number]

## 2018-09-26 NOTE — PROCEDURES
RADIOLOGY POST PROCEDURE NOTE w/ SEDATION  Patient name: Rickie Lagos  MRN: [id number]  : 1990    Pre-procedure diagnosis: Recurrent DVT RUE  Post-procedure diagnosis: Same    Procedure Date/Time: 2018  4:03 PM  Procedure: RUE venogram.  Nonocclusive thrombus visible in the basilic vein.  Occlusion of the subclavian vein.  10 cm infusion length catheter placed for tPA at 0.5 mg/hour, heparin through sheath at 500 U/hour.  Followup tomorrow.  Estimated blood loss: < 5 ml  Specimen(s) collected with description: None    I determined this patient to be an appropriate candidate for the planned sedation and procedure and reassessed the patient IMMEDIATELY PRIOR to sedation and procedure.     The patient tolerated the procedure well with no immediate complications.  Significant findings:  Please see above    See imaging dictation for procedural details.    Provider name: Rickie Otf Vogt  Assistant(s):None

## 2018-09-26 NOTE — IR NOTE
Interventional Radiology Intra-procedural Nursing Note    Patient Name: Rickie Lagos  Medical Record Number: [id number]  Today's Date: September 26, 2018    Start Time: 1527  End of procedure time: 1552  Procedure: Right UE venogram  Report given to: JENNIFER Starr on st [address]  Time pt departs:        Other Notes: TPA initiated to right upper extremity. VSS. Pt presley in the upper 40's- MD aware- not new. Pt remains on RA. No c/o pain at this time. TPA gtt verified with Rad MD. Bicep measures 30cm.     AUGUSTA BUSTOS

## 2018-09-26 NOTE — IP AVS SNAPSHOT
[facility]    [address] [place] [address]    Phone:  [phone]                                       After Visit Summary   9/26/2018    Rickie Lagos    MRN: [id number]           After Visit Summary Signature Page     I have received my discharge instructions, and my questions have been answered. I have discussed any challenges I see with this plan with the nurse or doctor.    ..........................................................................................................................................  Patient/Patient Representative Signature      ..........................................................................................................................................  Patient Representative Print Name and Relationship to Patient    ..................................................               ................................................  Date                                   Time    ..........................................................................................................................................  Reviewed by Signature/Title    ...................................................              ..............................................  Date                                               Time          22EPIC Rev 08/18

## 2018-09-27 ENCOUNTER — APPOINTMENT (OUTPATIENT)
Dept: INTERVENTIONAL RADIOLOGY/VASCULAR | Facility: CLINIC | Age: 28
DRG: 271 | End: 2018-09-27
Attending: RADIOLOGY
Payer: COMMERCIAL

## 2018-09-27 LAB
ANION GAP SERPL CALCULATED.3IONS-SCNC: 7 MMOL/L (ref 3–14)
APTT PPP: 225 SEC (ref 22–37)
APTT PPP: 46 SEC (ref 22–37)
BASOPHILS # BLD AUTO: 0.1 10E9/L (ref 0–0.2)
BASOPHILS NFR BLD AUTO: 0.8 %
BUN SERPL-MCNC: 11 MG/DL (ref 7–30)
CALCIUM SERPL-MCNC: 8.6 MG/DL (ref 8.5–10.1)
CHLORIDE SERPL-SCNC: 109 MMOL/L (ref 94–109)
CO2 SERPL-SCNC: 26 MMOL/L (ref 20–32)
CREAT SERPL-MCNC: 0.88 MG/DL (ref 0.66–1.25)
DIFFERENTIAL METHOD BLD: ABNORMAL
EOSINOPHIL # BLD AUTO: 0.2 10E9/L (ref 0–0.7)
EOSINOPHIL NFR BLD AUTO: 3.6 %
ERYTHROCYTE [DISTWIDTH] IN BLOOD BY AUTOMATED COUNT: 12.1 % (ref 10–15)
GFR SERPL CREATININE-BSD FRML MDRD: >90 ML/MIN/1.7M2
GLUCOSE SERPL-MCNC: 91 MG/DL (ref 70–99)
HCT VFR BLD AUTO: 38.7 % (ref 40–53)
HGB BLD-MCNC: 13.1 G/DL (ref 13.3–17.7)
IMM GRANULOCYTES # BLD: 0 10E9/L (ref 0–0.4)
IMM GRANULOCYTES NFR BLD: 0.3 %
LMWH PPP CHRO-ACNC: 0.44 IU/ML
LMWH PPP CHRO-ACNC: 1.08 IU/ML
LYMPHOCYTES # BLD AUTO: 2.3 10E9/L (ref 0.8–5.3)
LYMPHOCYTES NFR BLD AUTO: 34.8 %
MCH RBC QN AUTO: 30.8 PG (ref 26.5–33)
MCHC RBC AUTO-ENTMCNC: 33.9 G/DL (ref 31.5–36.5)
MCV RBC AUTO: 91 FL (ref 78–100)
MONOCYTES # BLD AUTO: 0.5 10E9/L (ref 0–1.3)
MONOCYTES NFR BLD AUTO: 7.1 %
NEUTROPHILS # BLD AUTO: 3.5 10E9/L (ref 1.6–8.3)
NEUTROPHILS NFR BLD AUTO: 53.4 %
NRBC # BLD AUTO: 0 10*3/UL
NRBC BLD AUTO-RTO: 0 /100
PLATELET # BLD AUTO: 261 10E9/L (ref 150–450)
POTASSIUM SERPL-SCNC: 4 MMOL/L (ref 3.4–5.3)
RBC # BLD AUTO: 4.26 10E12/L (ref 4.4–5.9)
SODIUM SERPL-SCNC: 142 MMOL/L (ref 133–144)
WBC # BLD AUTO: 6.6 10E9/L (ref 4–11)

## 2018-09-27 PROCEDURE — C1769 GUIDE WIRE: HCPCS

## 2018-09-27 PROCEDURE — C1753 CATH, INTRAVAS ULTRASOUND: HCPCS

## 2018-09-27 PROCEDURE — 85730 THROMBOPLASTIN TIME PARTIAL: CPT | Performed by: INTERNAL MEDICINE

## 2018-09-27 PROCEDURE — 85025 COMPLETE CBC W/AUTO DIFF WBC: CPT | Performed by: INTERNAL MEDICINE

## 2018-09-27 PROCEDURE — 99231 SBSQ HOSP IP/OBS SF/LOW 25: CPT | Mod: 57 | Performed by: SURGERY

## 2018-09-27 PROCEDURE — 21400002 ZZH R&B CCU CICU CRITICAL

## 2018-09-27 PROCEDURE — 27210906 ZZH KIT CR8

## 2018-09-27 PROCEDURE — 05C53ZZ EXTIRPATION OF MATTER FROM RIGHT SUBCLAVIAN VEIN, PERCUTANEOUS APPROACH: ICD-10-PCS | Performed by: RADIOLOGY

## 2018-09-27 PROCEDURE — C1757 CATH, THROMBECTOMY/EMBOLECT: HCPCS

## 2018-09-27 PROCEDURE — 25000128 H RX IP 250 OP 636

## 2018-09-27 PROCEDURE — 37214 CESSJ THERAPY CATH REMOVAL: CPT

## 2018-09-27 PROCEDURE — 25000132 ZZH RX MED GY IP 250 OP 250 PS 637: Performed by: INTERNAL MEDICINE

## 2018-09-27 PROCEDURE — C1725 CATH, TRANSLUMIN NON-LASER: HCPCS

## 2018-09-27 PROCEDURE — 27210845 ZZH DEVICE INFLATION CR5

## 2018-09-27 PROCEDURE — 25000132 ZZH RX MED GY IP 250 OP 250 PS 637: Performed by: RADIOLOGY

## 2018-09-27 PROCEDURE — 25500064 ZZH RX 255 OP 636: Performed by: INTERNAL MEDICINE

## 2018-09-27 PROCEDURE — 25000125 ZZHC RX 250

## 2018-09-27 PROCEDURE — 25000128 H RX IP 250 OP 636: Performed by: RADIOLOGY

## 2018-09-27 PROCEDURE — 85520 HEPARIN ASSAY: CPT | Performed by: INTERNAL MEDICINE

## 2018-09-27 PROCEDURE — 99233 SBSQ HOSP IP/OBS HIGH 50: CPT | Performed by: INTERNAL MEDICINE

## 2018-09-27 PROCEDURE — 27210732 ZZH ACCESSORY CR1

## 2018-09-27 PROCEDURE — 05753ZZ DILATION OF RIGHT SUBCLAVIAN VEIN, PERCUTANEOUS APPROACH: ICD-10-PCS | Performed by: RADIOLOGY

## 2018-09-27 PROCEDURE — 80048 BASIC METABOLIC PNL TOTAL CA: CPT | Performed by: INTERNAL MEDICINE

## 2018-09-27 PROCEDURE — 36415 COLL VENOUS BLD VENIPUNCTURE: CPT | Performed by: INTERNAL MEDICINE

## 2018-09-27 RX ORDER — FLUMAZENIL 0.1 MG/ML
0.2 INJECTION, SOLUTION INTRAVENOUS
Status: DISCONTINUED | OUTPATIENT
Start: 2018-09-27 | End: 2018-09-27 | Stop reason: HOSPADM

## 2018-09-27 RX ORDER — HEPARIN SODIUM 1000 [USP'U]/ML
INJECTION, SOLUTION INTRAVENOUS; SUBCUTANEOUS
Status: COMPLETED
Start: 2018-09-27 | End: 2018-09-27

## 2018-09-27 RX ORDER — FENTANYL CITRATE 50 UG/ML
INJECTION, SOLUTION INTRAMUSCULAR; INTRAVENOUS
Status: COMPLETED
Start: 2018-09-27 | End: 2018-09-27

## 2018-09-27 RX ORDER — ONDANSETRON 2 MG/ML
4 INJECTION INTRAMUSCULAR; INTRAVENOUS
Status: COMPLETED | OUTPATIENT
Start: 2018-09-27 | End: 2018-09-27

## 2018-09-27 RX ORDER — NALOXONE HYDROCHLORIDE 0.4 MG/ML
.1-.4 INJECTION, SOLUTION INTRAMUSCULAR; INTRAVENOUS; SUBCUTANEOUS
Status: DISCONTINUED | OUTPATIENT
Start: 2018-09-27 | End: 2018-09-27 | Stop reason: HOSPADM

## 2018-09-27 RX ORDER — HEPARIN SODIUM 1000 [USP'U]/ML
4000 INJECTION, SOLUTION INTRAVENOUS; SUBCUTANEOUS
Status: COMPLETED | OUTPATIENT
Start: 2018-09-27 | End: 2018-09-27

## 2018-09-27 RX ORDER — LIDOCAINE HYDROCHLORIDE 10 MG/ML
INJECTION, SOLUTION INFILTRATION; PERINEURAL
Status: DISCONTINUED
Start: 2018-09-27 | End: 2018-09-27 | Stop reason: HOSPADM

## 2018-09-27 RX ORDER — LIDOCAINE HYDROCHLORIDE 10 MG/ML
1-30 INJECTION, SOLUTION EPIDURAL; INFILTRATION; INTRACAUDAL; PERINEURAL
Status: COMPLETED | OUTPATIENT
Start: 2018-09-27 | End: 2018-09-27

## 2018-09-27 RX ORDER — FENTANYL CITRATE 50 UG/ML
25-50 INJECTION, SOLUTION INTRAMUSCULAR; INTRAVENOUS EVERY 5 MIN PRN
Status: DISCONTINUED | OUTPATIENT
Start: 2018-09-27 | End: 2018-09-27 | Stop reason: HOSPADM

## 2018-09-27 RX ORDER — ONDANSETRON 2 MG/ML
INJECTION INTRAMUSCULAR; INTRAVENOUS
Status: COMPLETED
Start: 2018-09-27 | End: 2018-09-27

## 2018-09-27 RX ORDER — IOPAMIDOL 612 MG/ML
50 INJECTION, SOLUTION INTRAVASCULAR ONCE
Status: COMPLETED | OUTPATIENT
Start: 2018-09-27 | End: 2018-09-27

## 2018-09-27 RX ADMIN — DOCUSATE SODIUM 100 MG: 100 CAPSULE, LIQUID FILLED ORAL at 20:50

## 2018-09-27 RX ADMIN — FENTANYL CITRATE 50 MCG: 50 INJECTION INTRAMUSCULAR; INTRAVENOUS at 09:40

## 2018-09-27 RX ADMIN — MIDAZOLAM HYDROCHLORIDE 1 MG: 1 INJECTION, SOLUTION INTRAMUSCULAR; INTRAVENOUS at 09:12

## 2018-09-27 RX ADMIN — MIDAZOLAM HYDROCHLORIDE 0.5 MG: 1 INJECTION, SOLUTION INTRAMUSCULAR; INTRAVENOUS at 09:50

## 2018-09-27 RX ADMIN — ALTEPLASE 2 MG: 2.2 INJECTION, POWDER, LYOPHILIZED, FOR SOLUTION INTRAVENOUS at 09:40

## 2018-09-27 RX ADMIN — FENTANYL CITRATE 25 MCG: 50 INJECTION INTRAMUSCULAR; INTRAVENOUS at 09:50

## 2018-09-27 RX ADMIN — MIDAZOLAM HYDROCHLORIDE 1 MG: 1 INJECTION, SOLUTION INTRAMUSCULAR; INTRAVENOUS at 09:28

## 2018-09-27 RX ADMIN — FENTANYL CITRATE 25 MCG: 50 INJECTION INTRAMUSCULAR; INTRAVENOUS at 10:01

## 2018-09-27 RX ADMIN — POTASSIUM CHLORIDE 20 MEQ: 1500 TABLET, EXTENDED RELEASE ORAL at 06:03

## 2018-09-27 RX ADMIN — Medication 1 LOZENGE: at 13:22

## 2018-09-27 RX ADMIN — Medication 5000 UNITS: at 11:38

## 2018-09-27 RX ADMIN — ALTEPLASE 0.5 MG/HR: KIT at 01:44

## 2018-09-27 RX ADMIN — SODIUM CHLORIDE: 9 INJECTION, SOLUTION INTRAVENOUS at 22:42

## 2018-09-27 RX ADMIN — HEPARIN SODIUM 4000 UNITS: 1000 INJECTION, SOLUTION INTRAVENOUS; SUBCUTANEOUS at 10:06

## 2018-09-27 RX ADMIN — LIDOCAINE HYDROCHLORIDE 7 ML: 10 INJECTION, SOLUTION INFILTRATION; PERINEURAL at 10:24

## 2018-09-27 RX ADMIN — FENTANYL CITRATE 50 MCG: 50 INJECTION INTRAMUSCULAR; INTRAVENOUS at 09:12

## 2018-09-27 RX ADMIN — SODIUM CHLORIDE: 9 INJECTION, SOLUTION INTRAVENOUS at 10:59

## 2018-09-27 RX ADMIN — HEPARIN SODIUM 10000 UNITS: 10000 INJECTION, SOLUTION INTRAVENOUS; SUBCUTANEOUS at 09:20

## 2018-09-27 RX ADMIN — ONDANSETRON 4 MG: 2 INJECTION INTRAMUSCULAR; INTRAVENOUS at 09:59

## 2018-09-27 RX ADMIN — HEPARIN SODIUM 1500 UNITS/HR: 10000 INJECTION, SOLUTION INTRAVENOUS at 11:37

## 2018-09-27 RX ADMIN — LIDOCAINE HYDROCHLORIDE 7 ML: 10 INJECTION, SOLUTION EPIDURAL; INFILTRATION; INTRACAUDAL; PERINEURAL at 10:24

## 2018-09-27 RX ADMIN — PROCHLORPERAZINE EDISYLATE 10 MG: 5 INJECTION INTRAMUSCULAR; INTRAVENOUS at 19:09

## 2018-09-27 RX ADMIN — MIDAZOLAM HYDROCHLORIDE 0.5 MG: 1 INJECTION, SOLUTION INTRAMUSCULAR; INTRAVENOUS at 10:01

## 2018-09-27 RX ADMIN — IOPAMIDOL 60 ML: 612 INJECTION, SOLUTION INTRAVENOUS at 10:21

## 2018-09-27 RX ADMIN — ONDANSETRON 4 MG: 2 INJECTION INTRAMUSCULAR; INTRAVENOUS at 16:38

## 2018-09-27 ASSESSMENT — ACTIVITIES OF DAILY LIVING (ADL)
ADLS_ACUITY_SCORE: 10
ADLS_ACUITY_SCORE: 9

## 2018-09-27 NOTE — PROGRESS NOTES
Brief Progress Note    Chart Reviewed.  Finished lytics today.    26 yo M with venous thoracic outlet syndrome s/p lytics readmitted with reocclusion while on anticoaguluation s/p lytics.    Plan for OR tomorrow.  NPO at midnight.  Discussed with patient and his parents.

## 2018-09-27 NOTE — PROGRESS NOTES
HOSPITALIST CONSULT CHART CHECK:      Hospitalist service was consulted for cross coverage only. We will peripherally follow and chart check throughout the week.        - For vascular medical concerns during business hours M-F, call the [facility] at [phone] to have the rounding/on call Vascular Medicine (NOT VASCULAR SURGERY) MD paged.      - After business hours M-F, for medical concerns on this patient, please page hospitalist staff.      - For vascular surgical questions, please page the appropriate surgeon (primary vascular surgeon or on call vascular surgeon) based upon the time of day.     Praveena Torers [facility]  Hospitalist service  [phone]

## 2018-09-27 NOTE — PROGRESS NOTES
A/O, VSS on RA. Arm circumference 30.5. neuros intact. Pulses intact. Rating pain 6/10 with PCA.  Heparin and TPA running. Potassium replaced last night and again this morning, recheck tomorrow am. Up to BR with SBA x1. NPO for IR. Tele NSR.

## 2018-09-27 NOTE — PROGRESS NOTES
[facility]  Vascular Medicine Progress Note          Assessment and Plan:   Active Problems:      Recurrent RUE DVT despite therapeutic AC with Xarelto due to extrinsic venous compression secondary to venous TOS (H)    Assessment: now S/P lysis / mechanical thrombectomy    Plan: plan to go to OR for first rib resection tomorrow, maintain on therapeutic AC overnight with IV UFH             Interval History:   doing well; no cp, sob, n/v/d, or abd pain.              Review of Systems:   The 10 point Review of Systems is negative other than noted in the HPI             Medications:       docusate sodium  100 mg Oral BID                  Physical Exam:     Patient Vitals for the past 24 hrs:   BP Temp Temp src Pulse Heart Rate Resp SpO2 Height Weight   09/27/18 1100 - - - - (!) 48 12 97 % - -   09/27/18 1059 (!) 121/91 - - - - - - - -   09/27/18 1035 - - - - 53 15 95 % - -   09/27/18 1030 125/88 - - - (!) 44 10 99 % - -   09/27/18 1025 (!) 124/93 - - - (!) 47 16 100 % - -   09/27/18 1020 (!) 130/95 - - - (!) 44 16 100 % - -   09/27/18 1015 123/86 - - - (!) 46 16 100 % - -   09/27/18 1010 121/88 - - - 53 12 99 % - -   09/27/18 1005 (!) 126/95 - - - (!) 49 12 99 % - -   09/27/18 1000 134/84 - - - 53 12 100 % - -   09/27/18 0955 (!) 130/101 - - - (!) 45 10 100 % - -   09/27/18 0950 - - - - 61 19 100 % - -   09/27/18 0945 120/88 - - - (!) 48 13 100 % - -   09/27/18 0940 115/74 - - - 56 16 100 % - -   09/27/18 0935 121/75 - - - 56 12 100 % - -   09/27/18 0930 118/77 - - - 51 16 100 % - -   09/27/18 0925 128/83 - - - 59 11 100 % - -   09/27/18 0920 (!) 124/95 - - - 51 16 100 % - -   09/27/18 0915 115/81 - - - 54 25 98 % - -   09/27/18 0910 123/84 - - - 56 17 100 % - -   09/27/18 0905 128/86 - - - 59 12 100 % - -   09/27/18 0900 (!) 133/92 - - - 60 23 100 % - -   09/27/18 0803 - 97.5  F (36.4  C) - - - 16 - - -   09/27/18 0600 116/79 - - - 55 16 97 % - -   09/27/18 0400 109/70 - - - 54 24 97 % - -   09/27/18  "0200 112/79 - - - 58 14 96 % - -   09/27/18 0000 121/79 - - - 58 16 98 % - -   09/26/18 2300 - - - - 76 18 98 % - -   09/26/18 2200 122/81 - - - 68 20 97 % - -   09/26/18 2100 - - - - 60 16 98 % - -   09/26/18 2000 (!) 148/100 - - - 67 14 99 % - -   09/26/18 1932 - 97.9  F (36.6  C) Axillary - - - - - -   09/26/18 1930 133/86 - - - 60 18 100 % - -   09/26/18 1900 115/72 - - - 56 12 97 % - -   09/26/18 1830 118/78 - - - 61 16 97 % - -   09/26/18 1800 111/74 - - - 51 11 96 % - -   09/26/18 1745 109/67 - - - 51 14 96 % - -   09/26/18 1735 - - - - - 14 - - -   09/26/18 1730 108/69 - - - (!) 46 10 98 % - -   09/26/18 1715 114/74 - - - 54 15 97 % - -   09/26/18 1700 114/65 - - - (!) 47 19 99 % - -   09/26/18 1645 107/64 97.4  F (36.3  C) Oral 50 - 12 97 % - -   09/26/18 1610 - - - - 54 - 100 % - -   09/26/18 1605 109/72 - - - 51 11 99 % - -   09/26/18 1604 - - - - - 16 - - -   09/26/18 1600 116/63 - - - 54 17 99 % - -   09/26/18 1555 104/67 - - - 54 13 99 % - -   09/26/18 1550 115/58 - - - 57 14 98 % - -   09/26/18 1545 121/70 - - - (!) 47 15 98 % - -   09/26/18 1540 112/67 - - - 56 14 99 % - -   09/26/18 1535 113/71 - - - 58 12 99 % - -   09/26/18 1530 107/61 - - - 65 18 98 % - -   09/26/18 1525 113/62 - - - 61 11 100 % - -   09/26/18 1520 118/68 - - - 73 26 - - -   09/26/18 1336 130/76 - - - - - - - -   09/26/18 1334 135/74 98.6  F (37  C) Oral 72 - 18 100 % 6' 4\" (1.93 m) 172 lb 11.2 oz (78.3 kg)     Wt Readings from Last 4 Encounters:   09/26/18 172 lb 11.2 oz (78.3 kg)   09/26/18 172 lb 12.8 oz (78.4 kg)   09/21/18 169 lb 4.8 oz (76.8 kg)   09/16/18 169 lb (76.7 kg)     No intake or output data in the 24 hours ending 09/27/18 1147  Constitutional: normal  Eyes: normal  ENT: normal  Neck: Supple, symmetrical, trachea midline, no adenopathy, thyroid symmetric, not enlarged and no tenderness, skin normal.  Hematologic / Lymphatic: normal  Back: normal  Lungs: No increased work of breathing, good air exchange, clear to " auscultation bilaterally, no crackles or wheezing.  Cardiovascular: Regular rate and rhythm, normal S1 and S2, no S3 or S4, and no murmur noted.  Chest / Breast: normal  Abdomen: normal  Genitourinary: normal  Musculoskeletal: No redness, warmth, or swelling of the joints.  Full range of motion noted.  Motor strength is 5 out of 5 all extremities bilaterally.  Tone is normal.  Neurologic: Awake, alert, oriented to name, place and time.  Cranial nerves II-XII are grossly intact.  Motor is 5 out of 5 bilaterally.  Cerebellar finger to nose, heel to shin intact.  Sensory is intact.  Babinski down going, Romberg negative, and gait is normal.  Neuropsychiatric: normal  Skin: normal           Data:     Results for orders placed or performed during the hospital encounter of 09/26/18 (from the past 24 hour(s))   CBC with platelets   Result Value Ref Range    WBC 7.9 4.0 - 11.0 10e9/L    RBC Count 4.74 4.4 - 5.9 10e12/L    Hemoglobin 14.5 13.3 - 17.7 g/dL    Hematocrit 42.7 40.0 - 53.0 %    MCV 90 78 - 100 fl    MCH 30.6 26.5 - 33.0 pg    MCHC 34.0 31.5 - 36.5 g/dL    RDW 12.0 10.0 - 15.0 %    Platelet Count 314 150 - 450 10e9/L   Creatinine With GFR   Result Value Ref Range    Creatinine 0.86 0.66 - 1.25 mg/dL    GFR Estimate >90 >60 mL/min/1.7m2    GFR Estimate If Black >90 >60 mL/min/1.7m2   INR   Result Value Ref Range    INR 1.56 (H) 0.86 - 1.14   Partial thromboplastin time   Result Value Ref Range    PTT 49 (H) 22 - 37 sec   IR Upper Extremity Venogram Right    Narrative    IR UPPER EXTREMITY VENOGRAM RIGHT 9/26/2018 3:58 PM    HISTORY: 27-year-old patient with history of thoracic outlet syndrome.  Patient underwent thrombolysis and angioplasty of right subclavian  vein in preparation for resection of right first rib. The patient has  reoccluded in the interim.    TECHNIQUE: Patient was brought to interventional radiology department  and informed consent obtained. Patient was placed in a supine  position. Given lack  of palpable veins, ultrasound was used to  visualize the right upper arm basilic vein. Nonocclusive thrombus  noted in the mid to distal basilic vein. Access was obtained in a  patent segment of the vein more central. Given lack of palpable veins,  ultrasound was used to visualize the basilic vein and image stored for  documentation. After 1% lidocaine, a micropuncture kit was used to  access the basilic vein. Venogram was then performed in the right  upper extremity. 6 Lithuan[place] sheath was placed. KENNY 1 catheter was  advanced to the axillary vein where venogram performed. Subclavian  vein found to be occluded. Catheter was advanced through the occluded  subclavian vein and into the right innominate vein where venogram  performed. A 10 cm infusion length catheter was then placed throughout  the subclavian vein. Completion venogram performed demonstrating  appropriate position. Overall clot burden is relatively low.    Sedation: 4 mg IV Versed, 200 mcg IV fentanyl.  Sedation time: 25 minutes.  Please note the above medications were administered by the  Interventional Radiology Staff under my direct supervision. The  patient's vital signs were monitored and remained stable throughout  the procedure.  Fluoroscopic time: 1.6 minutes  Total fluoroscopic dose: 8.65 mGy.  Contrast: 20 mL Isovue administered intravenously without  complication.  Local anesthetic: 2 mL of 1% lidocaine.    FINDINGS: Total of four spot fluoroscopic images and venogram  sequences obtained throughout the procedure. Occlusion again noted in  the subclavian vein. Appropriate placement of infusion catheter at  completion.      Impression    IMPRESSION: Thrombolysis to begin in the right subclavian vein due to  thoracic outlet syndrome. TPA will begin with 10 cm infusion length  catheter and 0.5 mg/hour and heparin through basilic vein sheath at  500 units/hour. Patient will return the following day for repeat  venogram. As of now, plan is for surgical  resection of the first rib  in two days.    ERLIN ELDRIDGE MD   Basic metabolic panel   Result Value Ref Range    Sodium 142 133 - 144 mmol/L    Potassium 3.9 3.4 - 5.3 mmol/L    Chloride 108 94 - 109 mmol/L    Carbon Dioxide 27 20 - 32 mmol/L    Anion Gap 7 3 - 14 mmol/L    Glucose 85 70 - 99 mg/dL    Urea Nitrogen 12 7 - 30 mg/dL    Creatinine 0.89 0.66 - 1.25 mg/dL    GFR Estimate >90 >60 mL/min/1.7m2    GFR Estimate If Black >90 >60 mL/min/1.7m2    Calcium 8.8 8.5 - 10.1 mg/dL   INR   Result Value Ref Range    INR 1.33 (H) 0.86 - 1.14   Partial thromboplastin time   Result Value Ref Range    PTT 56 (H) 22 - 37 sec   CBC with platelets differential   Result Value Ref Range    WBC 7.1 4.0 - 11.0 10e9/L    RBC Count 4.43 4.4 - 5.9 10e12/L    Hemoglobin 13.4 13.3 - 17.7 g/dL    Hematocrit 39.9 (L) 40.0 - 53.0 %    MCV 90 78 - 100 fl    MCH 30.2 26.5 - 33.0 pg    MCHC 33.6 31.5 - 36.5 g/dL    RDW 12.0 10.0 - 15.0 %    Platelet Count 278 150 - 450 10e9/L    Diff Method Automated Method     % Neutrophils 62.4 %    % Lymphocytes 29.6 %    % Monocytes 4.9 %    % Eosinophils 2.4 %    % Basophils 0.6 %    % Immature Granulocytes 0.1 %    Nucleated RBCs 0 0 /100    Absolute Neutrophil 4.4 1.6 - 8.3 10e9/L    Absolute Lymphocytes 2.1 0.8 - 5.3 10e9/L    Absolute Monocytes 0.4 0.0 - 1.3 10e9/L    Absolute Eosinophils 0.2 0.0 - 0.7 10e9/L    Absolute Basophils 0.0 0.0 - 0.2 10e9/L    Abs Immature Granulocytes 0.0 0 - 0.4 10e9/L    Absolute Nucleated RBC 0.0    Basic metabolic panel   Result Value Ref Range    Sodium 142 133 - 144 mmol/L    Potassium 4.0 3.4 - 5.3 mmol/L    Chloride 109 94 - 109 mmol/L    Carbon Dioxide 26 20 - 32 mmol/L    Anion Gap 7 3 - 14 mmol/L    Glucose 91 70 - 99 mg/dL    Urea Nitrogen 11 7 - 30 mg/dL    Creatinine 0.88 0.66 - 1.25 mg/dL    GFR Estimate >90 >60 mL/min/1.7m2    GFR Estimate If Black >90 >60 mL/min/1.7m2    Calcium 8.6 8.5 - 10.1 mg/dL   CBC with platelets differential   Result Value Ref  Range    WBC 6.6 4.0 - 11.0 10e9/L    RBC Count 4.26 (L) 4.4 - 5.9 10e12/L    Hemoglobin 13.1 (L) 13.3 - 17.7 g/dL    Hematocrit 38.7 (L) 40.0 - 53.0 %    MCV 91 78 - 100 fl    MCH 30.8 26.5 - 33.0 pg    MCHC 33.9 31.5 - 36.5 g/dL    RDW 12.1 10.0 - 15.0 %    Platelet Count 261 150 - 450 10e9/L    Diff Method Automated Method     % Neutrophils 53.4 %    % Lymphocytes 34.8 %    % Monocytes 7.1 %    % Eosinophils 3.6 %    % Basophils 0.8 %    % Immature Granulocytes 0.3 %    Nucleated RBCs 0 0 /100    Absolute Neutrophil 3.5 1.6 - 8.3 10e9/L    Absolute Lymphocytes 2.3 0.8 - 5.3 10e9/L    Absolute Monocytes 0.5 0.0 - 1.3 10e9/L    Absolute Eosinophils 0.2 0.0 - 0.7 10e9/L    Absolute Basophils 0.1 0.0 - 0.2 10e9/L    Abs Immature Granulocytes 0.0 0 - 0.4 10e9/L    Absolute Nucleated RBC 0.0    Heparin 10a Level   Result Value Ref Range    Heparin 10A Level 0.44 IU/mL   Partial thromboplastin time   Result Value Ref Range    PTT 46 (H) 22 - 37 sec

## 2018-09-27 NOTE — PROGRESS NOTES
Vascular Surgery    Back on floor.  Venogram with reocclusion of right SCV. Catheters placed and on tPa with recheck tomorrow.  No complaints.  Discussed plan with pt and father and Girlfriend.      Kong Madden MD

## 2018-09-27 NOTE — PLAN OF CARE
Problem: Patient Care Overview  Goal: Plan of Care/Patient Progress Review  Outcome: Improving  Off IMC at 1430. TPA/heparin sheath removed around 1030. A/O, VSS on RA. neuros intact. Pulses intact. Rating pain 6/10 with PCA.  Heparin gtt at 15ml/hr. Up to BR with SBA. Regular diet-will be NPo at midnight for surgery in AM. Tele NSR.

## 2018-09-27 NOTE — IR NOTE
Interventional Radiology Intra-procedural Nursing Note    Patient Name: Rickie Lagos  Medical Record Number: [id number]  Today's Date: September 27, 2018    Start Time: 0935  End of procedure time: 1015  Procedure: right arm venogram, declot, angioplasty  Report given to: loulou Rivas and flying alireza thompson  Time pt departs: 1035  Other Notes: pt tolerated well. Rue declot and angioplasty, sheath and infusion catheters removed, rue access site c/d/i with gauze/tegaderm dressing. Strong right radial pulse, hand warm. IV heparin infusion started at 1500 units/hr per Dr. Hernandez. VSS. Returns to room air with unlabored respirations. Sedation 3mg Versed and 150mcg Fentanyl    Kimberly Vital RN

## 2018-09-28 ENCOUNTER — ANESTHESIA (OUTPATIENT)
Dept: SURGERY | Facility: CLINIC | Age: 28
DRG: 271 | End: 2018-09-28
Payer: COMMERCIAL

## 2018-09-28 ENCOUNTER — OFFICE VISIT (OUTPATIENT)
Dept: SURGERY | Facility: PHYSICIAN GROUP | Age: 28
End: 2018-09-28
Payer: COMMERCIAL

## 2018-09-28 ENCOUNTER — ANESTHESIA EVENT (OUTPATIENT)
Dept: SURGERY | Facility: CLINIC | Age: 28
DRG: 271 | End: 2018-09-28
Payer: COMMERCIAL

## 2018-09-28 ENCOUNTER — APPOINTMENT (OUTPATIENT)
Dept: GENERAL RADIOLOGY | Facility: CLINIC | Age: 28
DRG: 271 | End: 2018-09-28
Attending: STUDENT IN AN ORGANIZED HEALTH CARE EDUCATION/TRAINING PROGRAM
Payer: COMMERCIAL

## 2018-09-28 PROBLEM — G54.0 THORACIC OUTLET SYNDROME: Status: ACTIVE | Noted: 2018-09-28

## 2018-09-28 LAB
APTT PPP: 118 SEC (ref 22–37)
CARDIOLIPIN ANTIBODY IGG: <1.6 GPL-U/ML (ref 0–19.9)
CARDIOLIPIN ANTIBODY IGM: 0.3 MPL-U/ML (ref 0–19.9)
CARDIOLIPIN IGA SERPL-ACNC: 0.5 APL U/ML (ref 0–19.9)
COPATH REPORT: NORMAL
LMWH PPP CHRO-ACNC: 0.12 IU/ML
LMWH PPP CHRO-ACNC: 0.59 IU/ML

## 2018-09-28 PROCEDURE — 25000125 ZZHC RX 250: Performed by: NURSE ANESTHETIST, CERTIFIED REGISTERED

## 2018-09-28 PROCEDURE — 25000566 ZZH SEVOFLURANE, EA 15 MIN: Performed by: SURGERY

## 2018-09-28 PROCEDURE — 40000170 ZZH STATISTIC PRE-PROCEDURE ASSESSMENT II: Performed by: SURGERY

## 2018-09-28 PROCEDURE — 25000128 H RX IP 250 OP 636: Performed by: NURSE ANESTHETIST, CERTIFIED REGISTERED

## 2018-09-28 PROCEDURE — 25000128 H RX IP 250 OP 636: Performed by: STUDENT IN AN ORGANIZED HEALTH CARE EDUCATION/TRAINING PROGRAM

## 2018-09-28 PROCEDURE — 25000132 ZZH RX MED GY IP 250 OP 250 PS 637: Performed by: STUDENT IN AN ORGANIZED HEALTH CARE EDUCATION/TRAINING PROGRAM

## 2018-09-28 PROCEDURE — 85520 HEPARIN ASSAY: CPT | Performed by: INTERNAL MEDICINE

## 2018-09-28 PROCEDURE — 99233 SBSQ HOSP IP/OBS HIGH 50: CPT | Performed by: INTERNAL MEDICINE

## 2018-09-28 PROCEDURE — 40000986 XR CHEST PORT 1 VW

## 2018-09-28 PROCEDURE — 71000013 ZZH RECOVERY PHASE 1 LEVEL 1 EA ADDTL HR: Performed by: SURGERY

## 2018-09-28 PROCEDURE — 37000008 ZZH ANESTHESIA TECHNICAL FEE, 1ST 30 MIN: Performed by: SURGERY

## 2018-09-28 PROCEDURE — 21400002 ZZH R&B CCU CICU CRITICAL

## 2018-09-28 PROCEDURE — 36415 COLL VENOUS BLD VENIPUNCTURE: CPT | Performed by: PHYSICIAN ASSISTANT

## 2018-09-28 PROCEDURE — 36000093 ZZH SURGERY LEVEL 4 1ST 30 MIN: Performed by: SURGERY

## 2018-09-28 PROCEDURE — 85730 THROMBOPLASTIN TIME PARTIAL: CPT | Performed by: INTERNAL MEDICINE

## 2018-09-28 PROCEDURE — 71000012 ZZH RECOVERY PHASE 1 LEVEL 1 FIRST HR: Performed by: SURGERY

## 2018-09-28 PROCEDURE — 36000063 ZZH SURGERY LEVEL 4 EA 15 ADDTL MIN: Performed by: SURGERY

## 2018-09-28 PROCEDURE — 86923 COMPATIBILITY TEST ELECTRIC: CPT | Performed by: SURGERY

## 2018-09-28 PROCEDURE — 21615 EXCISION 1ST &/CERVICAL RIB: CPT | Mod: RT | Performed by: SURGERY

## 2018-09-28 PROCEDURE — 25000128 H RX IP 250 OP 636: Performed by: SURGERY

## 2018-09-28 PROCEDURE — 86900 BLOOD TYPING SEROLOGIC ABO: CPT | Performed by: SURGERY

## 2018-09-28 PROCEDURE — 36415 COLL VENOUS BLD VENIPUNCTURE: CPT | Performed by: INTERNAL MEDICINE

## 2018-09-28 PROCEDURE — 25000132 ZZH RX MED GY IP 250 OP 250 PS 637

## 2018-09-28 PROCEDURE — 01N30ZZ RELEASE BRACHIAL PLEXUS, OPEN APPROACH: ICD-10-PCS | Performed by: SURGERY

## 2018-09-28 PROCEDURE — 25000128 H RX IP 250 OP 636: Performed by: ANESTHESIOLOGY

## 2018-09-28 PROCEDURE — 86901 BLOOD TYPING SEROLOGIC RH(D): CPT | Performed by: SURGERY

## 2018-09-28 PROCEDURE — 85520 HEPARIN ASSAY: CPT | Performed by: PHYSICIAN ASSISTANT

## 2018-09-28 PROCEDURE — 27210794 ZZH OR GENERAL SUPPLY STERILE: Performed by: SURGERY

## 2018-09-28 PROCEDURE — 37000009 ZZH ANESTHESIA TECHNICAL FEE, EACH ADDTL 15 MIN: Performed by: SURGERY

## 2018-09-28 PROCEDURE — 86850 RBC ANTIBODY SCREEN: CPT | Performed by: SURGERY

## 2018-09-28 PROCEDURE — 25000125 ZZHC RX 250: Performed by: SURGERY

## 2018-09-28 RX ORDER — HEPARIN SODIUM 5000 [USP'U]/.5ML
5000 INJECTION, SOLUTION INTRAVENOUS; SUBCUTANEOUS EVERY 8 HOURS
Status: DISCONTINUED | OUTPATIENT
Start: 2018-09-29 | End: 2018-09-29

## 2018-09-28 RX ORDER — SODIUM CHLORIDE, SODIUM LACTATE, POTASSIUM CHLORIDE, CALCIUM CHLORIDE 600; 310; 30; 20 MG/100ML; MG/100ML; MG/100ML; MG/100ML
INJECTION, SOLUTION INTRAVENOUS CONTINUOUS
Status: DISCONTINUED | OUTPATIENT
Start: 2018-09-28 | End: 2018-09-29 | Stop reason: CLARIF

## 2018-09-28 RX ORDER — VECURONIUM BROMIDE 1 MG/ML
INJECTION, POWDER, LYOPHILIZED, FOR SOLUTION INTRAVENOUS PRN
Status: DISCONTINUED | OUTPATIENT
Start: 2018-09-28 | End: 2018-09-28

## 2018-09-28 RX ORDER — DIPHENHYDRAMINE HYDROCHLORIDE 50 MG/ML
25 INJECTION INTRAMUSCULAR; INTRAVENOUS EVERY 6 HOURS PRN
Status: DISCONTINUED | OUTPATIENT
Start: 2018-09-28 | End: 2018-10-05

## 2018-09-28 RX ORDER — CLINDAMYCIN PHOSPHATE 900 MG/50ML
900 INJECTION, SOLUTION INTRAVENOUS
Status: DISCONTINUED | OUTPATIENT
Start: 2018-09-28 | End: 2018-09-28 | Stop reason: HOSPADM

## 2018-09-28 RX ORDER — HYDROMORPHONE HYDROCHLORIDE 1 MG/ML
.3-.5 INJECTION, SOLUTION INTRAMUSCULAR; INTRAVENOUS; SUBCUTANEOUS EVERY 5 MIN PRN
Status: DISCONTINUED | OUTPATIENT
Start: 2018-09-28 | End: 2018-09-28 | Stop reason: HOSPADM

## 2018-09-28 RX ORDER — ONDANSETRON 2 MG/ML
4 INJECTION INTRAMUSCULAR; INTRAVENOUS EVERY 30 MIN PRN
Status: DISCONTINUED | OUTPATIENT
Start: 2018-09-28 | End: 2018-09-28 | Stop reason: HOSPADM

## 2018-09-28 RX ORDER — FENTANYL CITRATE 50 UG/ML
25-50 INJECTION, SOLUTION INTRAMUSCULAR; INTRAVENOUS
Status: DISCONTINUED | OUTPATIENT
Start: 2018-09-28 | End: 2018-09-28 | Stop reason: HOSPADM

## 2018-09-28 RX ORDER — ACETAMINOPHEN 325 MG/1
325-650 TABLET ORAL EVERY 4 HOURS PRN
Status: DISCONTINUED | OUTPATIENT
Start: 2018-09-28 | End: 2018-09-28

## 2018-09-28 RX ORDER — NALOXONE HYDROCHLORIDE 0.4 MG/ML
.1-.4 INJECTION, SOLUTION INTRAMUSCULAR; INTRAVENOUS; SUBCUTANEOUS
Status: DISCONTINUED | OUTPATIENT
Start: 2018-09-28 | End: 2018-10-05

## 2018-09-28 RX ORDER — HYDROMORPHONE HYDROCHLORIDE 1 MG/ML
.3-.5 INJECTION, SOLUTION INTRAMUSCULAR; INTRAVENOUS; SUBCUTANEOUS
Status: DISCONTINUED | OUTPATIENT
Start: 2018-09-28 | End: 2018-10-01

## 2018-09-28 RX ORDER — TRAMADOL HYDROCHLORIDE 50 MG/1
50 TABLET ORAL EVERY 6 HOURS PRN
Qty: 10 TABLET | Refills: 0 | Status: SHIPPED | OUTPATIENT
Start: 2018-09-28 | End: 2018-11-13 | Stop reason: DRUGHIGH

## 2018-09-28 RX ORDER — MAGNESIUM HYDROXIDE 1200 MG/15ML
LIQUID ORAL PRN
Status: DISCONTINUED | OUTPATIENT
Start: 2018-09-28 | End: 2018-09-28 | Stop reason: HOSPADM

## 2018-09-28 RX ORDER — ONDANSETRON 2 MG/ML
4 INJECTION INTRAMUSCULAR; INTRAVENOUS EVERY 6 HOURS PRN
Status: DISCONTINUED | OUTPATIENT
Start: 2018-09-28 | End: 2018-10-05

## 2018-09-28 RX ORDER — SODIUM CHLORIDE, SODIUM LACTATE, POTASSIUM CHLORIDE, CALCIUM CHLORIDE 600; 310; 30; 20 MG/100ML; MG/100ML; MG/100ML; MG/100ML
INJECTION, SOLUTION INTRAVENOUS CONTINUOUS
Status: DISCONTINUED | OUTPATIENT
Start: 2018-09-28 | End: 2018-09-28 | Stop reason: HOSPADM

## 2018-09-28 RX ORDER — ONDANSETRON 4 MG/1
4 TABLET, ORALLY DISINTEGRATING ORAL EVERY 30 MIN PRN
Status: DISCONTINUED | OUTPATIENT
Start: 2018-09-28 | End: 2018-09-28 | Stop reason: HOSPADM

## 2018-09-28 RX ORDER — ONDANSETRON 4 MG/1
4 TABLET, ORALLY DISINTEGRATING ORAL EVERY 6 HOURS PRN
Status: DISCONTINUED | OUTPATIENT
Start: 2018-09-28 | End: 2018-10-05

## 2018-09-28 RX ORDER — TRAMADOL HYDROCHLORIDE 50 MG/1
50-100 TABLET ORAL EVERY 6 HOURS PRN
Status: DISCONTINUED | OUTPATIENT
Start: 2018-09-28 | End: 2018-09-30

## 2018-09-28 RX ORDER — PROPOFOL 10 MG/ML
INJECTION, EMULSION INTRAVENOUS PRN
Status: DISCONTINUED | OUTPATIENT
Start: 2018-09-28 | End: 2018-09-28

## 2018-09-28 RX ORDER — ONDANSETRON 2 MG/ML
INJECTION INTRAMUSCULAR; INTRAVENOUS PRN
Status: DISCONTINUED | OUTPATIENT
Start: 2018-09-28 | End: 2018-09-28

## 2018-09-28 RX ORDER — CLINDAMYCIN PHOSPHATE 900 MG/50ML
900 INJECTION, SOLUTION INTRAVENOUS SEE ADMIN INSTRUCTIONS
Status: DISCONTINUED | OUTPATIENT
Start: 2018-09-28 | End: 2018-09-28 | Stop reason: HOSPADM

## 2018-09-28 RX ORDER — NALOXONE HYDROCHLORIDE 0.4 MG/ML
.1-.4 INJECTION, SOLUTION INTRAMUSCULAR; INTRAVENOUS; SUBCUTANEOUS
Status: DISCONTINUED | OUTPATIENT
Start: 2018-09-28 | End: 2018-09-28

## 2018-09-28 RX ORDER — LIDOCAINE 40 MG/G
CREAM TOPICAL
Status: DISCONTINUED | OUTPATIENT
Start: 2018-09-28 | End: 2018-10-05

## 2018-09-28 RX ORDER — METHOCARBAMOL 750 MG/1
750 TABLET, FILM COATED ORAL 4 TIMES DAILY PRN
Status: DISCONTINUED | OUTPATIENT
Start: 2018-09-28 | End: 2018-10-06 | Stop reason: HOSPADM

## 2018-09-28 RX ORDER — KETOROLAC TROMETHAMINE 30 MG/ML
30 INJECTION, SOLUTION INTRAMUSCULAR; INTRAVENOUS EVERY 6 HOURS
Status: DISPENSED | OUTPATIENT
Start: 2018-09-28 | End: 2018-09-29

## 2018-09-28 RX ORDER — GLYCOPYRROLATE 0.2 MG/ML
INJECTION, SOLUTION INTRAMUSCULAR; INTRAVENOUS PRN
Status: DISCONTINUED | OUTPATIENT
Start: 2018-09-28 | End: 2018-09-28

## 2018-09-28 RX ORDER — FENTANYL CITRATE 50 UG/ML
INJECTION, SOLUTION INTRAMUSCULAR; INTRAVENOUS PRN
Status: DISCONTINUED | OUTPATIENT
Start: 2018-09-28 | End: 2018-09-28

## 2018-09-28 RX ORDER — BUPIVACAINE HYDROCHLORIDE 5 MG/ML
INJECTION, SOLUTION PERINEURAL PRN
Status: DISCONTINUED | OUTPATIENT
Start: 2018-09-28 | End: 2018-09-28 | Stop reason: HOSPADM

## 2018-09-28 RX ORDER — ACETAMINOPHEN 325 MG/1
650 TABLET ORAL EVERY 4 HOURS PRN
Status: DISCONTINUED | OUTPATIENT
Start: 2018-10-01 | End: 2018-09-29

## 2018-09-28 RX ORDER — KETOROLAC TROMETHAMINE 30 MG/ML
INJECTION, SOLUTION INTRAMUSCULAR; INTRAVENOUS PRN
Status: DISCONTINUED | OUTPATIENT
Start: 2018-09-28 | End: 2018-09-28

## 2018-09-28 RX ORDER — SODIUM CHLORIDE, SODIUM LACTATE, POTASSIUM CHLORIDE, CALCIUM CHLORIDE 600; 310; 30; 20 MG/100ML; MG/100ML; MG/100ML; MG/100ML
INJECTION, SOLUTION INTRAVENOUS CONTINUOUS PRN
Status: DISCONTINUED | OUTPATIENT
Start: 2018-09-28 | End: 2018-09-28

## 2018-09-28 RX ORDER — LIDOCAINE HYDROCHLORIDE 20 MG/ML
INJECTION, SOLUTION INFILTRATION; PERINEURAL PRN
Status: DISCONTINUED | OUTPATIENT
Start: 2018-09-28 | End: 2018-09-28

## 2018-09-28 RX ORDER — DIPHENHYDRAMINE HCL 25 MG
25 CAPSULE ORAL EVERY 6 HOURS PRN
Status: DISCONTINUED | OUTPATIENT
Start: 2018-09-28 | End: 2018-10-05

## 2018-09-28 RX ORDER — ACETAMINOPHEN 325 MG/1
TABLET ORAL
Status: COMPLETED
Start: 2018-09-28 | End: 2018-09-28

## 2018-09-28 RX ORDER — CEFAZOLIN SODIUM 1 G/3ML
INJECTION, POWDER, FOR SOLUTION INTRAMUSCULAR; INTRAVENOUS PRN
Status: DISCONTINUED | OUTPATIENT
Start: 2018-09-28 | End: 2018-09-28

## 2018-09-28 RX ORDER — ACETAMINOPHEN 325 MG/1
650 TABLET ORAL EVERY 4 HOURS PRN
Status: DISCONTINUED | OUTPATIENT
Start: 2018-09-28 | End: 2018-09-28

## 2018-09-28 RX ORDER — NEOSTIGMINE METHYLSULFATE 1 MG/ML
VIAL (ML) INJECTION PRN
Status: DISCONTINUED | OUTPATIENT
Start: 2018-09-28 | End: 2018-09-28

## 2018-09-28 RX ORDER — ACETAMINOPHEN 325 MG/1
975 TABLET ORAL EVERY 8 HOURS
Status: DISPENSED | OUTPATIENT
Start: 2018-09-28 | End: 2018-10-02

## 2018-09-28 RX ADMIN — ROCURONIUM BROMIDE 50 MG: 10 INJECTION INTRAVENOUS at 14:05

## 2018-09-28 RX ADMIN — ACETAMINOPHEN 650 MG: 325 TABLET, FILM COATED ORAL at 07:44

## 2018-09-28 RX ADMIN — CEFAZOLIN 2 G: 1 INJECTION, POWDER, FOR SOLUTION INTRAMUSCULAR; INTRAVENOUS at 13:41

## 2018-09-28 RX ADMIN — ONDANSETRON 4 MG: 2 INJECTION INTRAMUSCULAR; INTRAVENOUS at 15:40

## 2018-09-28 RX ADMIN — PHENYLEPHRINE HYDROCHLORIDE 100 MCG: 10 INJECTION, SOLUTION INTRAMUSCULAR; INTRAVENOUS; SUBCUTANEOUS at 13:27

## 2018-09-28 RX ADMIN — PROPOFOL 40 MG: 10 INJECTION, EMULSION INTRAVENOUS at 14:07

## 2018-09-28 RX ADMIN — MIDAZOLAM 2 MG: 1 INJECTION INTRAMUSCULAR; INTRAVENOUS at 13:13

## 2018-09-28 RX ADMIN — SODIUM CHLORIDE, POTASSIUM CHLORIDE, SODIUM LACTATE AND CALCIUM CHLORIDE: 600; 310; 30; 20 INJECTION, SOLUTION INTRAVENOUS at 14:29

## 2018-09-28 RX ADMIN — Medication 0.5 MG: at 16:46

## 2018-09-28 RX ADMIN — FENTANYL CITRATE 50 MCG: 50 INJECTION INTRAMUSCULAR; INTRAVENOUS at 16:14

## 2018-09-28 RX ADMIN — PROPOFOL 200 MG: 10 INJECTION, EMULSION INTRAVENOUS at 13:18

## 2018-09-28 RX ADMIN — Medication 0.5 MG: at 16:32

## 2018-09-28 RX ADMIN — GLYCOPYRROLATE 0.5 MG: 0.2 INJECTION, SOLUTION INTRAMUSCULAR; INTRAVENOUS at 15:36

## 2018-09-28 RX ADMIN — FENTANYL CITRATE 50 MCG: 50 INJECTION, SOLUTION INTRAMUSCULAR; INTRAVENOUS at 14:34

## 2018-09-28 RX ADMIN — HYDROMORPHONE HYDROCHLORIDE 0.5 MG: 1 INJECTION, SOLUTION INTRAMUSCULAR; INTRAVENOUS; SUBCUTANEOUS at 15:27

## 2018-09-28 RX ADMIN — LIDOCAINE HYDROCHLORIDE 100 MG: 20 INJECTION, SOLUTION INFILTRATION; PERINEURAL at 13:15

## 2018-09-28 RX ADMIN — NEOSTIGMINE METHYLSULFATE 3.5 MG: 1 INJECTION, SOLUTION INTRAVENOUS at 15:36

## 2018-09-28 RX ADMIN — SODIUM CHLORIDE, POTASSIUM CHLORIDE, SODIUM LACTATE AND CALCIUM CHLORIDE: 600; 310; 30; 20 INJECTION, SOLUTION INTRAVENOUS at 13:14

## 2018-09-28 RX ADMIN — VECURONIUM BROMIDE 1 MG: 1 INJECTION, POWDER, LYOPHILIZED, FOR SOLUTION INTRAVENOUS at 14:46

## 2018-09-28 RX ADMIN — FENTANYL CITRATE 50 MCG: 50 INJECTION, SOLUTION INTRAMUSCULAR; INTRAVENOUS at 13:52

## 2018-09-28 RX ADMIN — KETOROLAC TROMETHAMINE 30 MG: 30 INJECTION, SOLUTION INTRAMUSCULAR at 21:16

## 2018-09-28 RX ADMIN — FENTANYL CITRATE 50 MCG: 50 INJECTION INTRAMUSCULAR; INTRAVENOUS at 16:23

## 2018-09-28 RX ADMIN — PHENYLEPHRINE HYDROCHLORIDE 100 MCG: 10 INJECTION, SOLUTION INTRAMUSCULAR; INTRAVENOUS; SUBCUTANEOUS at 14:48

## 2018-09-28 RX ADMIN — Medication 0.5 MG: at 22:52

## 2018-09-28 RX ADMIN — FENTANYL CITRATE 50 MCG: 50 INJECTION, SOLUTION INTRAMUSCULAR; INTRAVENOUS at 13:19

## 2018-09-28 RX ADMIN — ACETAMINOPHEN 650 MG: 325 TABLET, FILM COATED ORAL at 10:50

## 2018-09-28 RX ADMIN — KETOROLAC TROMETHAMINE 30 MG: 30 INJECTION, SOLUTION INTRAMUSCULAR at 15:57

## 2018-09-28 RX ADMIN — SODIUM CHLORIDE, POTASSIUM CHLORIDE, SODIUM LACTATE AND CALCIUM CHLORIDE: 600; 310; 30; 20 INJECTION, SOLUTION INTRAVENOUS at 19:24

## 2018-09-28 RX ADMIN — METHOCARBAMOL 750 MG: 750 TABLET ORAL at 20:20

## 2018-09-28 RX ADMIN — SODIUM CHLORIDE, POTASSIUM CHLORIDE, SODIUM LACTATE AND CALCIUM CHLORIDE: 600; 310; 30; 20 INJECTION, SOLUTION INTRAVENOUS at 17:09

## 2018-09-28 RX ADMIN — PROPOFOL 70 MG: 10 INJECTION, EMULSION INTRAVENOUS at 13:52

## 2018-09-28 RX ADMIN — FENTANYL CITRATE 50 MCG: 50 INJECTION, SOLUTION INTRAMUSCULAR; INTRAVENOUS at 13:55

## 2018-09-28 RX ADMIN — TRAMADOL HYDROCHLORIDE 100 MG: 50 TABLET, COATED ORAL at 19:22

## 2018-09-28 RX ADMIN — ACETAMINOPHEN 650 MG: 325 TABLET ORAL at 07:44

## 2018-09-28 RX ADMIN — Medication 0.5 MG: at 17:07

## 2018-09-28 RX ADMIN — DEXMEDETOMIDINE HYDROCHLORIDE 4 MCG: 100 INJECTION, SOLUTION INTRAVENOUS at 13:49

## 2018-09-28 RX ADMIN — Medication 0.5 MG: at 18:59

## 2018-09-28 RX ADMIN — ACETAMINOPHEN 975 MG: 325 TABLET, FILM COATED ORAL at 21:17

## 2018-09-28 ASSESSMENT — COPD QUESTIONNAIRES: COPD: 0

## 2018-09-28 ASSESSMENT — ACTIVITIES OF DAILY LIVING (ADL)
ADLS_ACUITY_SCORE: 9

## 2018-09-28 ASSESSMENT — PAIN DESCRIPTION - DESCRIPTORS: DESCRIPTORS: HEADACHE

## 2018-09-28 ASSESSMENT — LIFESTYLE VARIABLES: TOBACCO_USE: 1

## 2018-09-28 NOTE — BRIEF OP NOTE
[facility] Brief Operative Note    Pre-operative diagnosis: THORACIC OUTLET SYNDROME    Post-operative diagnosis As above   Procedure: Procedure(s):  RIGHT TRANSAXILLARY FIRST RIB RESECTION, SCALENECTOMY  - Wound Class: I-Clean   Surgeon(s): Surgeon(s) and Role:     * Kong Madden MD - Primary     * Kwesi Bonilla MD - Resident - Assisting     * Davon Rios MD - Resident - Assisting   Estimated blood loss: 5 mL    Specimens: none   Findings: Large pleural tear noted, ALICIA drain left in place  Will see CXR and ALICIA output prior to restarting flat rate heparin gtt

## 2018-09-28 NOTE — PROGRESS NOTES
Vascular Surgery    I saw Rickie Lagos late this morning after he just returned from his venogram.  They were able to successfully open up again in the right axillary and subclavian veins felt to be occluded from thoracic outlet syndrome.    He is on intravenous heparin which we will continue until his surgery tomorrow at noon or we plan decompression of the right thoracic outlet via a transaxillary approach.  Risks and benefits were again reviewed with he and his family were with him he had no further questions.    Images were reviewed following the venogram today and intervention.  The subclavian vein adjacent to the first rib has evidence of external compression which we hope to relieve with the surgery tomorrow.  There is a high likelihood of the vein may be occluded with the surgical manipulation and repeat venogram and intervention once we have open up the thoracic outlet and relieved all extrinsic compression of the vein will be necessary.  We discussed this previously with the patient and his family and they are aware of this.       Kong Madden MD

## 2018-09-28 NOTE — PROGRESS NOTES
[facility]  Vascular Medicine Progress Note          Assessment and Plan:   Active Problems:      Recurrent RUE DVT despite therapeutic AC with Xarelto due to extrinsic venous compression secondary to venous TOS (H)      Assessment: sleeping comfortably, now S/P lysis / mechanical thrombectomy      Plan: plan to go to OR for first rib resection today, maintain on IV UFH until in preinduction             Interval History:   doing well; no cp, sob, n/v/d, or abd pain.              Review of Systems:   The 10 point Review of Systems is unable to be obtained secondary to patient sleeping             Medications:       docusate sodium  100 mg Oral BID                  Physical Exam:     Patient Vitals for the past 24 hrs:   BP Temp Temp src Pulse Heart Rate Resp SpO2   09/28/18 0200 - - - - - 16 -   09/28/18 0000 116/55 97.8  F (36.6  C) Oral - 50 16 100 %   09/27/18 2100 - - - - - 16 -   09/27/18 1915 (!) 140/92 98.4  F (36.9  C) Oral 54 54 16 100 %   09/27/18 1541 133/85 97.7  F (36.5  C) Oral 53 53 16 99 %   09/27/18 1300 (!) 122/93 - - - 72 20 95 %   09/27/18 1200 (!) 150/106 - - - 70 27 100 %   09/27/18 1145 - - - - (!) 49 15 100 %   09/27/18 1130 (!) 157/106 - - - 52 9 -   09/27/18 1100 - - - - (!) 48 12 97 %   09/27/18 1059 (!) 121/91 - - - - - -   09/27/18 1035 - - - - 53 15 95 %   09/27/18 1030 125/88 - - - (!) 44 10 99 %   09/27/18 1025 (!) 124/93 - - - (!) 47 16 100 %   09/27/18 1020 (!) 130/95 - - - (!) 44 16 100 %   09/27/18 1015 123/86 - - - (!) 46 16 100 %   09/27/18 1010 121/88 - - - 53 12 99 %   09/27/18 1005 (!) 126/95 - - - (!) 49 12 99 %   09/27/18 1000 134/84 - - - 53 12 100 %   09/27/18 0955 (!) 130/101 - - - (!) 45 10 100 %   09/27/18 0950 - - - - 61 19 100 %   09/27/18 0945 120/88 - - - (!) 48 13 100 %   09/27/18 0940 115/74 - - - 56 16 100 %   09/27/18 0935 121/75 - - - 56 12 100 %   09/27/18 0930 118/77 - - - 51 16 100 %     Wt Readings from Last 4 Encounters:   09/26/18 78.3 kg  (172 lb 11.2 oz)   09/26/18 78.4 kg (172 lb 12.8 oz)   09/21/18 76.8 kg (169 lb 4.8 oz)   09/16/18 76.7 kg (169 lb)     No intake or output data in the 24 hours ending 09/27/18 1147  Constitutional: normal  Eyes: normal  ENT: normal  Neck: Supple, symmetrical, trachea midline, no adenopathy, thyroid symmetric, not enlarged and no tenderness, skin normal.  Hematologic / Lymphatic: normal  Back: normal  Lungs: No increased work of breathing, good air exchange, clear to auscultation bilaterally, no crackles or wheezing.  Cardiovascular: Regular rate and rhythm, normal S1 and S2, no S3 or S4, and no murmur noted.  Chest / Breast: normal  Abdomen: normal  Genitourinary: normal  Musculoskeletal: No redness, warmth, or swelling of the joints.  Full range of motion noted.  Motor strength is 5 out of 5 all extremities bilaterally.  Tone is normal.  Neurologic: Awake, alert, oriented to name, place and time.  Cranial nerves II-XII are grossly intact.  Motor is 5 out of 5 bilaterally.  Cerebellar finger to nose, heel to shin intact.  Sensory is intact.  Babinski down going, Romberg negative, and gait is normal.  Neuropsychiatric: normal  Skin: normal           Data:     Results for orders placed or performed during the hospital encounter of 09/26/18 (from the past 24 hour(s))   IR Angiogram through Catheter Follow Up    Narrative    INTERVENTIONAL RADIOLOGY ANGIOGRAM THROUGH CATHETER FOLLOW UP   9/27/2018 10:28 AM     HISTORY: Patient has recurrent DVT in the right subclavian vein felt  to be secondary to thoracic outlet syndrome.    COMPARISON: Venogram dated 9/19/2018    DESCRIPTION OF PROCEDURE: The patient was placed in a supine position  on the fluoroscopy table. A venogram was performed through existing  right arm sheath as well as and infusion catheter.    FINDINGS: There had been some interval thrombolysis of thrombus within  the right subclavian vein. There remained stagnant flow within the  right axillary and lateral  subclavian veins.    Intervention: Mechanical thrombectomy was performed using an AngioJet  device and  device. Balloon angioplasty was performed with a 10  mm balloon. The site of angioplasty was evaluated angiographically as  well as with endovascular ultrasound. A small lumen was achieved  measuring approximately 7 mm in diameter on endovascular ultrasound.  There was spontaneous flow across the right subclavian vein which had  not been seen previously. At this time, it was elected to accept these  results. The patient will undergo a rib resection tomorrow. Patient  will likely need a repeat venography and possible reintervention on  the subclavian vein at that time. Patient will be maintained on  adequate anticoagulation prior to rib resection and following rib  resection.    The right arm sheath was removed. Pressure was held at the puncture  site for 10 minutes with good hemostasis.    I determined this patient to be an appropriate candidate for the  planned sedation and procedure and reassessed the patient immediately  prior to sedation and procedure. The patient tolerated the procedure  well. There were no immediate postprocedure complications. The  patient's vital signs were monitored by radiology nursing staff under  my supervision and remained stable throughout the study.     MEDICATIONS: 3 mg Versed, 150 mcg fentanyl    Sedation time: 40 minutes    TPA 2 mg, Zofran 4 mg, heparin 4000 units    Fluoroscopy time: 5.9 minutes    Total fluoroscopy dose: 38 mGy    Contrast: 60 mL Isovue      Impression    IMPRESSION: Post catheter directed thrombolysis, mechanical  thrombectomy, antegrade flow through the medial right subclavian vein  has been reestablished. There remains a moderate stenosis in the right  subclavian vein. This will likely need to be re-intervened upon  following first rib resection.   Heparin 10a Level   Result Value Ref Range    Heparin 10A Level 1.08 (HH) IU/mL   Partial  thromboplastin time   Result Value Ref Range     (HH) 22 - 37 sec   Heparin 10a Level   Result Value Ref Range    Heparin 10A Level 0.59 IU/mL   Partial thromboplastin time   Result Value Ref Range     (HH) 22 - 37 sec

## 2018-09-28 NOTE — PROGRESS NOTES
HOSPITALIST CONSULT CHART CHECK:      Hospitalist service was consulted for cross coverage only. We will peripherally follow and chart check throughout the week.        - For vascular medical concerns during business hours M-F, call the [facility] at [phone] to have the rounding/on call Vascular Medicine (NOT VASCULAR SURGERY) MD paged.      - After business hours M-F, for medical concerns on this patient, please page hospitalist staff.      - For vascular surgical questions, please page the appropriate surgeon (primary vascular surgeon or on call vascular surgeon) based upon the time of day.     Praveena Torres [facility]  Hospitalist service  [phone]

## 2018-09-28 NOTE — ANESTHESIA POSTPROCEDURE EVALUATION
Patient: Rickie Lagos    Procedure(s):  RIGHT TRANSAXILLARY FIRST RIB RESECTION, SCALENECTOMY  - Wound Class: I-Clean    Diagnosis:THORACIC OUTLET SYNDROME   Diagnosis Additional Information: No value filed.    Anesthesia Type:  General    Note:  Anesthesia Post Evaluation    Patient location during evaluation: bedside  Patient participation: Able to fully participate in evaluation  Level of consciousness: awake  Pain management: adequate  Airway patency: patent  Cardiovascular status: acceptable  Respiratory status: acceptable  Hydration status: acceptable  PONV: none     Anesthetic complications: None    Comments: No anesthetic complications noted.         Last vitals:  Vitals:    09/28/18 1620 09/28/18 1630 09/28/18 1640   BP: 146/82 142/81 138/83   Pulse:      Resp: 19 17 10   Temp:  36.4  C (97.5  F)    SpO2: 100% 99% 99%         Electronically Signed By: Andry Jansen DO, DO  September 28, 2018  4:45 PM

## 2018-09-28 NOTE — PLAN OF CARE
Problem: Patient Care Overview  Goal: Plan of Care/Patient Progress Review  Outcome: No Change  Pt NPO. Heparin dose changed to 500 units/hr. Headache pain 7/10, tylenol given. Pt went down for right transaxillary resection of first rib.

## 2018-09-28 NOTE — ANESTHESIA PREPROCEDURE EVALUATION
Procedure: Procedure(s):  TRANSAXILLARY RESECT FIRST RIB  Preop diagnosis: THORACIC OUTLET SYNDROME     Allergies   Allergen Reactions     Amoxicillin Rash     Past Medical History:   Diagnosis Date     DVT of axillary vein, acute right (H) 09/16/2018     TOS (thoracic outlet syndrome) 09/16/2018     Past Surgical History:   Procedure Laterality Date     HC TOOTH EXTRACTION W/FORCEP Bilateral     wisdom tooth extraction     XR EXTREMITY VENOGRAM INJ RIGHT Right 09/16/2018     Social History   Substance Use Topics     Smoking status: Current Every Day Smoker     Types: Other     Smokeless tobacco: Never Used      Comment: Patient vapes     Alcohol use Yes      Comment: very occasionally     Prior to Admission medications    Medication Sig Start Date End Date Taking? Authorizing Provider   HYDROcodone-acetaminophen (NORCO) 5-325 MG per tablet Take 1 tablet by mouth every 6 hours as needed for severe pain Every 4 hours to every 6 hours as needed 9/19/18  Yes Nemo Montalvo MD   LORazepam (ATIVAN) 0.5 MG tablet Take 1-2 tablets (0.5-1 mg) by mouth every 8 hours as needed (taken 1 hour before the procedure) Do not drive after taking this medication 9/21/18  Yes Chalo Mazariegos MD   rivaroxaban ANTICOAGULANT (XARELTO) 15 MG TABS tablet Take 1 tablet (15 mg) by mouth 2 times daily (with meals) 9/19/18  Yes Suzan Sweet PA-C   traMADol (ULTRAM) 50 MG tablet Take 1 tablet (50 mg) by mouth every 6 hours as needed for moderate to severe pain or severe pain 9/28/18  Yes Kwesi Bonilla MD     Current Facility-Administered Medications Ordered in Epic   Medication Dose Route Frequency Last Rate Last Dose     acetaminophen (TYLENOL) tablet 325-650 mg  325-650 mg Oral Q4H PRN         benzocaine-menthol (CHLORASEPTIC) 6-10 MG lozenge 1 lozenge  1 lozenge Buccal Q1H PRN   1 lozenge at 09/27/18 1322     diphenhydrAMINE (BENADRYL) capsule 25 mg  25 mg Oral Q4H PRN        Or     diphenhydrAMINE (BENADRYL)  injection 25 mg  25 mg Intravenous Q4H PRN         docusate sodium (COLACE) capsule 100 mg  100 mg Oral BID   100 mg at 09/27/18 2050     heparin infusion 25,000 units in 0.45% NaCl 250 mL  500 Units/hr Intravenous Continuous 5 mL/hr at 09/28/18 0748 500 Units/hr at 09/28/18 0748     HYDROmorphone (DILAUDID) PCA 1 mg/mL OPIOID NAIVE   Intravenous Continuous         metoclopramide (REGLAN) tablet 10 mg  10 mg Oral Q6H PRN        Or     metoclopramide (REGLAN) injection 10 mg  10 mg Intravenous Q6H PRN         ondansetron (ZOFRAN-ODT) ODT tab 4 mg  4 mg Oral Q6H PRN        Or     ondansetron (ZOFRAN) injection 4 mg  4 mg Intravenous Q6H PRN   4 mg at 09/27/18 1638     sodium chloride 0.9% infusion   Intravenous Continuous 75 mL/hr at 09/28/18 0934       Current Outpatient Prescriptions Ordered in [facility]   Medication     traMADol (ULTRAM) 50 MG tablet       HEParin 500 Units/hr (09/28/18 0748)     HYDROmorphone       sodium chloride 75 mL/hr at 09/28/18 0934     Wt Readings from Last 1 Encounters:   09/26/18 78.3 kg (172 lb 11.2 oz)     Temp Readings from Last 1 Encounters:   09/28/18 36.6  C (97.8  F) (Oral)     BP Readings from Last 6 Encounters:   09/28/18 116/55   09/26/18 120/77   09/21/18 116/63   09/19/18 135/82   09/16/18 101/70     Pulse Readings from Last 4 Encounters:   09/27/18 54   09/26/18 81   09/21/18 117   09/17/18 55     Resp Readings from Last 1 Encounters:   09/28/18 16     SpO2 Readings from Last 1 Encounters:   09/28/18 100%     Recent Labs   Lab Test  09/27/18   0423  09/26/18   1930   NA  142  142   POTASSIUM  4.0  3.9   CHLORIDE  109  108   CO2  26  27   ANIONGAP  7  7   GLC  91  85   BUN  11  12   CR  0.88  0.89   JC  8.6  8.8     No results for input(s): AST, ALT, ALKPHOS, BILITOTAL, LIPASE in the last 22064 hours.  Recent Labs   Lab Test  09/27/18   0423  09/26/18   1930   WBC  6.6  7.1   HGB  13.1*  13.4   PLT  261  278     No results for input(s): ABO, RH in the last 59098 hours.  Recent  Labs   Lab Test  09/28/18   0325  09/27/18   1833   09/26/18   1930  09/26/18   1348   INR   --    --    --   1.33*  1.56*   PTT  118*  225*   < >  56*  49*    < > = values in this interval not displayed.      No results for input(s): TROPI in the last 39519 hours.  No results for input(s): PH, PCO2, PO2, HCO3 in the last 71723 hours.  No results for input(s): HCG in the last 16889 hours.  Recent Results (from the past 744 hour(s))   US Upper Extremity Venous Duplex Right    Narrative    US UPPER EXTREMITY VENOUS DUPLEX RIGHT  9/16/2018 4:55 PM     HISTORY: Paresthesias, color change.    COMPARISON: None.    TECHNIQUE: Examination of the upper extremity veins was performed with  graded compression and 2-D ultrasound and color doppler spectral  waveform analysis.     FINDINGS:  There is occlusive thrombus in the right subclavian vein  through the axillary vein. The basilic and cephalic and brachial veins  are patent.  The veins in the forearm show no evidence of thrombosis.      Impression    IMPRESSION: Occlusive thrombus in the right subclavian and axillary  veins.        LAUREEN MENDIETA MD   Chest CT, IV contrast only - PE protocol    Narrative    CT CHEST PULMONARY EMBOLISM WITH CONTRAST  9/16/2018 6:22 PM     HISTORY:   Shortness of breath. Evaluate for pulmonary embolism.    TECHNIQUE:    Helical axial scans from lung apices through lung bases  with 65 mL Isovue-370 IV contrast. Radiation dose for this scan was  reduced using automated exposure control, adjustment of the mA and/or  kV according to patient size, or iterative reconstruction technique.    COMPARISON:    None.    FINDINGS:    There is no CT evidence for pulmonary embolism or other  acute vascular abnormality of the chest. The mediastinal and hilar  structures are unremarkable. The lungs are clear bilaterally.  Visualized upper abdomen shows mild splenomegaly and no other  abnormality.      Impression    IMPRESSION:  1. No evidence for pulmonary  embolism.  2. Mild splenomegaly.      JAMES CLAYTON MD   XR Chest 1 View    Narrative    XR CHEST 1 VW 9/17/2018 10:55 AM     HISTORY: hx of subclavian/axillary DVT; please eval for cervical rib;     COMPARISON: None      Impression    IMPRESSION: The cardiac silhouette and pulmonary vasculature are  normal. The lungs are clear. There is no evidence of a cervical rib.    DAQUAN LISA MD   IR Upper Extremity Venogram Right    Addendum: 9/28/2018    Total sedation time 37 minutes.    JESSICA MALDONADO MD      Narrative    PROCEDURE:     Right upper extremity venogram  Right Central venogram  Initiation chemical thrombolysis.    DATE OF PROCEDURE:  9/17/2018 1:56 PM    OPERATORS:    Jessica Maldonado MD    MEDICATIONS:  1% lidocaine SQ, 2.5 mg IV Versed, 125 mcg IV fentanyl, 6 mg IV TPA    CONTRAST:    12    REFERENCED AIR KERMA: 18 mGy  FLUOROSCOPY TIME: 4 minutes    ESTIMATED BLOOD LOSS:  Minimal    COMPLICATIONS:  None    PRE-PROCEDURE DIAGNOSIS: Right axillary and subclavian venous thrombus  POST-PROCEDURE DIAGNOSIS: Same    CLINICAL HISTORY/INDICATION:  27-year-old male with unprovoked right axillary and subclavian venous  thrombus presents for catheter directed thrombolysis.    PROCEDURE AND FINDINGS:    Following a discussion of the risks, benefits, indications and  alternatives to treatment, appropriate informed consent was obtained.   The patient was brought to the interventional radiology suite and  placed supine on the table. The patient's right arm was prepped and  draped using maximum sterile barrier technique for tunneled line  placement including: cap AND mask AND sterile gown AND sterile gloves  AND sterile full body drape AND hand hygiene AND skin preparation 2%  chlorhexidine for cutaneous antisepsis (or acceptable alternative  antiseptics).  A timeout was performed per hospital universal protocol  policy to ensure correct patient, site, and procedure to be performed.    Ultrasound was utilized to  evaluate the veins of the right arm and a  permanent record of the image was obtained, which demonstrated the  basilic vein to be patent. Under direct ultrasound guidance, 1%  lidocaine was infiltrated and access was gained easily into the  basilic vein utilizing micropuncture technique.    A guide wire was then advanced into the vein. Exchange made for a 5  [place] micropuncture dilator. Right upper extremity venogram was  performed showing patent brachial and basilic veins. The axillary vein  is occluded. Exchange was then made for a 6 [place] vascular sheath  through which a 0.035 inch Glidewire and angled Berenstein catheter  were used in tandem to traverse the axillary occlusion. With the  catheter tip within the subclavian vein, digital subtraction  angiography was performed showing occlusive and nonocclusive thrombus  within the subclavian vein extending into numerous sidebranches.  Contrast flows via collateral pathways into the SVC. The wire and  catheter were then used to traverse the occluded subclavian vein into  the SVC. With the catheter tip in the superior vena cava, digital  subtraction angiography was performed showing widely patent superior  vena cava without evidence of caval thrombus. The catheter was removed  over a wire and a 20 cm infusion length catheter was advanced over the  wire and positioned with the leading marker in the superior vena cava  and the trailing marker in the trailing axillary vein. 6 mg of TPA was  bolused through the infusion catheter. The sheath was sutured in place  with a 2-0 Ethilon suture and the infusion catheter secured.     Throughout the procedure, the patient was monitored by a radiology  nurse for cardiac rhythm which remained stable. The patient tolerated  the procedure well and left the interventional radiology suite in  stable condition.      Impression    IMPRESSION:     1.  Right upper extremity and central venogram showing occlusive and  nonocclusive  thrombus present within the axillary and subclavian  veins. The superior vena cava is patent. Peripheral brachial and  basilic veins are patent.  2.  Initiation thrombolytic therapy via a 20 cm infusion length  catheter extending from the axillary vein to the SVC.    PLAN:  TPA will be administered at a rate of 1 mg per hour via the infusion  catheter  Heparin 500 units IV via the side arm of the vascular sheath.    JESSICA HIGH MD   IR Angiogram through Catheter Follow Up    Addendum: 9/21/2018    Procedure was performed on 9/18/2018.    Comparison is made to the exam dated 9/17/2018.    SILAS WATT MD      Narrative    INTERVENTIONAL RADIOLOGY ANGIOGRAM THROUGH CATHETER FOLLOW UP  September 19, 2018 4:43 PM     HISTORY: Patient undergoing catheter directed thrombolysis to treat  thrombus in the right medial subclavian vein.    COMPARISON: 9/18/2018.    DESCRIPTION OF PROCEDURE: The patient was placed in a supine position  on the fluoroscopy table. The right arm including external portions of  existing right arm sheath and infusion catheter were prepped and  draped in the usual sterile manner. 1% lidocaine was injected for  local anesthesia. A venogram was performed through existing infusion  catheter.    FINDINGS: There had been significant interval lysis of thrombus from  the medial right subclavian vein. Severe stenosis within the  subclavian vein was identified. This was treated with balloon  angioplasty using a 12 mm and 14 mm balloons. Follow-up venogram  showed improvement in luminal diameter and improved flow however flow  remained fairly slow with persistent filling of multiple collaterals  lateral to the right subclavian vein. Therefore, it was elected to  continue thrombolysis for an additional night. A 5 Citizen of [place] infusion  catheter was deployed across the medial subclavian vein and catheter  directed thrombolysis was restarted.    I determined this patient to be an appropriate candidate for  the  planned sedation and procedure and reassessed the patient immediately  prior to sedation and procedure. The patient tolerated the procedure  well. There were no immediate postprocedure complications. The  patient's vital signs were monitored by radiology nursing staff under  my supervision and remained stable throughout the study.     MEDICATIONS: 4 mg Versed, 200 mcg fentanyl.    Sedation time: 51 minutes.    Fluoroscopy time: 3.8 minutes.    Total fluoroscopy dose: 15.2 mGy.    Contrast: 30 mL Isovue.      Impression    IMPRESSION: Venography shows improvement in amounts of thrombus from  the right subclavian vein. A stenosis within the vein was  angioplastied as above. There remains some residual thrombus with  obstruction to flow. Therefore, catheter directed thrombolysis will be  continued overnight. TPA check would be performed tomorrow.    SILAS WATT MD   IR Angiogram through Catheter Follow Up    Narrative    PROCEDURE(S):  1.  Right subclavian and central venogram  2.  AngioJet mechanical/aspiration thrombectomy  3.  Balloon maceration of subclavian thrombus.  4.  Conventional angioplasty of central subclavian vein    DATE OF PROCEDURE:  9/19/2018 12:32 PM    OPERATORS:    Jey Maldonado MD    MEDICATIONS:   1% Lidocaine SQ, Versed 3 mg IV, Fentanyl 150 mcg IV    CONTRAST:   50 mL Isovue 370 IV    REFERENCED AIR KERMA: 26 mGy  FLUOROSCOPY TIME: 7 minutes     ESTIMATED BLOOD LOSS:   Minimal    COMPLICATIONS:   None    PRE-PROCEDURE DIAGNOSIS: Subclavian vein thrombus  POST-PROCEDURE DIAGNOSIS: Same    CLINICAL HISTORY/INDICATION:   27-year-old male with suspected thoracic outlet syndrome and  subclavian vein thrombus now 2 days post initiation of thrombolytic  therapy.    PROCEDURES AND FINDINGS:  Following a discussion of the risks, benefits, indications, and  alternatives to treatment, informed consent was obtained. The patient  was brought to the interventional radiology suite and placed supine  on  the table. The right arm inclusive of the infusion catheter and  vascular sheath were prepped and draped in a routine sterile fashion.  A timeout was performed per hospital universal protocol policy to  ensure correct patient, site, and procedure to be performed.      Contrast was injected via the side arm of the vascular sheath and  digital subtraction angiography was performed showing nonocclusive  thrombus within the peripheral subclavian vein and occlusive thrombus  within the central subclavian vein. The innominate and SVC filter via  collateral vessels. Indication catheter was removed over a wire and  digital subtraction angiography was repeated again showing occlusive  thrombus within the central subclavian vein and nonocclusive thrombus  in the more peripheral subclavian vein. Vascular sheath was exchanged  for an 8 [place] vascular sheath and 8 [place] AngioJet  mechanical/aspiration thrombectomy was performed of the subclavian  vein. Post AngioJet angiography shows decreased thrombus burden with  now only partially occlusive thrombus within the central subclavian  vein. 10 mm angioplasty followed by balloon sweep was performed of the  subclavian vein. This is followed by 12 mm angioplasty of the central  subclavian vein. Completion imaging shows antegrade flow through the  subclavian vein with narrowing of the central portion. No residual  thrombus is seen. Decreased filling of collateral vessels.    Throughout the procedure, the patient was monitored by a radiology  nurse for cardiac rhythm, blood pressure and oxygen saturation which  remained stable. Total sedation time was 34 minutes. The patient  tolerated the procedure well and left the interventional radiology  suite in stable condition.      Impression    IMPRESSION:  1.  Aspiration/mechanical thrombectomy followed by balloon maceration  and Central subclavian angioplasty with no residual thrombus seen on  completion angiogram.    JESSICA HIGH,  MD   US Upper Extremity Venous Duplex Right    Narrative    PROCEDURE:  Venous Doppler ultrasound of the right upper extremity    DATE OF PROCEDURE:  9/26/2018 11:06 AM    CLINICAL HISTORY/INDICATION:   27-year-old male with thoracic outlet syndrome status post upper  extremity venous lysis completed 9/19/2018.    COMPARISON:   9/16/2018.    TECHNIQUE:   Grayscale, color-flow, and spectral waveform analysis were performed  of the deep veins of the right upper extremity    FINDINGS:   The right jugular vein demonstrates normal compressibility, color-flow  and spectral waveform.    There is occlusive thrombus within the subclavian vein. The axillary,  cephalic, brachial, radial and ulnar veins are all patent. There is  nonocclusive thrombus within the right basilic vein are likely  associated with catheter access site during thrombolytic therapy.    Rouleaux flow within the axillary vein.      Impression    IMPRESSION:   1.  Occlusive thrombus within the right subclavian vein.  2.  Nonocclusive thrombus in right basilic vein likely associated with  prior vascular sheath.    Results communicated with Kacey Sweet PA-C 9/26/2018 12:48 PM    JESSICA HIGH MD   IR Upper Extremity Venogram Right    Narrative    IR UPPER EXTREMITY VENOGRAM RIGHT 9/26/2018 3:58 PM    HISTORY: 27-year-old patient with history of thoracic outlet syndrome.  Patient underwent thrombolysis and angioplasty of right subclavian  vein in preparation for resection of right first rib. The patient has  reoccluded in the interim.    TECHNIQUE: Patient was brought to interventional radiology department  and informed consent obtained. Patient was placed in a supine  position. Given lack of palpable veins, ultrasound was used to  visualize the right upper arm basilic vein. Nonocclusive thrombus  noted in the mid to distal basilic vein. Access was obtained in a  patent segment of the vein more central. Given lack of palpable veins,  ultrasound was used to  visualize the basilic vein and image stored for  documentation. After 1% lidocaine, a micropuncture kit was used to  access the basilic vein. Venogram was then performed in the right  upper extremity. 6 [place] sheath was placed. KENNY 1 catheter was  advanced to the axillary vein where venogram performed. Subclavian  vein found to be occluded. Catheter was advanced through the occluded  subclavian vein and into the right innominate vein where venogram  performed. A 10 cm infusion length catheter was then placed throughout  the subclavian vein. Completion venogram performed demonstrating  appropriate position. Overall clot burden is relatively low.    Sedation: 4 mg IV Versed, 200 mcg IV fentanyl.  Sedation time: 25 minutes.  Please note the above medications were administered by the  Interventional Radiology Staff under my direct supervision. The  patient's vital signs were monitored and remained stable throughout  the procedure.  Fluoroscopic time: 1.6 minutes  Total fluoroscopic dose: 8.65 mGy.  Contrast: 20 mL Isovue administered intravenously without  complication.  Local anesthetic: 2 mL of 1% lidocaine.    FINDINGS: Total of four spot fluoroscopic images and venogram  sequences obtained throughout the procedure. Occlusion again noted in  the subclavian vein. Appropriate placement of infusion catheter at  completion.      Impression    IMPRESSION: Thrombolysis to begin in the right subclavian vein due to  thoracic outlet syndrome. TPA will begin with 10 cm infusion length  catheter and 0.5 mg/hour and heparin through basilic vein sheath at  500 units/hour. Patient will return the following day for repeat  venogram. As of now, plan is for surgical resection of the first rib  in two days.    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    COMPARISON: Venogram dated 9/19/2018    DESCRIPTION OF PROCEDURE: The patient was placed in a supine position  on the fluoroscopy table. A venogram was performed through existing  right arm sheath as well as and infusion catheter.    FINDINGS: There had been some interval thrombolysis of thrombus within  the right subclavian vein. There remained stagnant flow within the  right axillary and lateral subclavian veins.    Intervention: Mechanical thrombectomy was performed using an AngioJet  device and  device. Balloon angioplasty was performed with a 10  mm balloon. The site of angioplasty was evaluated angiographically as  well as with endovascular ultrasound. A small lumen was achieved  measuring approximately 7 mm in diameter on endovascular ultrasound.  There was spontaneous flow across the right subclavian vein which had  not been seen previously. At this time, it was elected to accept these  results. The patient will undergo a rib resection tomorrow. Patient  will likely need a repeat venography and possible reintervention on  the subclavian vein at that time. Patient will be maintained on  adequate anticoagulation prior to rib resection and following rib  resection.    The right arm sheath was removed. Pressure was held at the puncture  site for 10 minutes with good hemostasis.    I determined this patient to be an appropriate candidate for the  planned sedation and procedure and reassessed the patient immediately  prior to sedation and procedure. The patient tolerated the procedure  well. There were no immediate postprocedure complications. The  patient's vital signs were monitored by radiology nursing staff under  my supervision and remained stable throughout the study.     MEDICATIONS: 3 mg Versed, 150 mcg fentanyl    Sedation time: 40 minutes    TPA 2 mg, Zofran 4 mg, heparin 4000 units    Fluoroscopy time: 5.9 minutes    Total fluoroscopy dose: 38  mGy    Contrast: 60 mL Isovue      Impression    IMPRESSION: Post catheter directed thrombolysis, mechanical  thrombectomy, antegrade flow through the medial right subclavian vein  has been reestablished. There remains a moderate stenosis in the right  subclavian vein. This will likely need to be re-intervened upon  following first rib resection.       RECENT LABS:   ECG:   ECHO:     Anesthesia Evaluation     . Pt has had prior anesthetic. Type: MAC    No history of anesthetic complications          ROS/MED HX    ENT/Pulmonary:     (+)tobacco use, Current use , . .   (-) asthma, COPD, sleep apnea, GOPI risk factors, recent URI and allergic rhinitis   Neurologic:     (+)other neuro Thoracic Outlet Syndrome    Cardiovascular:  - neg cardiovascular ROS   (+) ----. : . . . :. . Previous cardiac testing date:results:date: results:ECG reviewed date:9/26/18 results:NSR date: results:          METS/Exercise Tolerance:  >4 METS   Hematologic:     (+) History of blood clots pt is anticoagulated, Anemia, -      Musculoskeletal:  - neg musculoskeletal ROS       GI/Hepatic:  - neg GI/hepatic ROS      (-) GERD   Renal/Genitourinary:  - ROS Renal section negative       Endo:  - neg endo ROS       Psychiatric:     (+) psychiatric history anxiety      Infectious Disease:  - neg infectious disease ROS       Malignancy:      - no malignancy   Other:    - neg other ROS                 Physical Exam  Normal systems: cardiovascular, pulmonary and dental    Airway   Mallampati: I  TM distance: >3 FB  Neck ROM: full    Dental   Comment: Recent wisdom tooth extraction that is healing/has continued to ooze while being on a heparin gtt    Cardiovascular       Pulmonary                     Anesthesia Plan      History & Physical Review  History and physical reviewed and following examination; no interval change.    ASA Status:  2 .    NPO Status:  > 8 hours    Plan for General and LMA with Intravenous and Propofol induction. Maintenance will be  Inhalation.    PONV prophylaxis:  Ondansetron (or other 5HT-3) and Dexamethasone or Solumedrol  Additional equipment: 2nd IV      Postoperative Care  Postoperative pain management:  IV analgesics, Oral pain medications and Multi-modal analgesia.      Consents  Anesthetic plan, risks, benefits and alternatives discussed with:  Patient..                          .

## 2018-09-28 NOTE — PLAN OF CARE
Problem: Patient Care Overview  Goal: Plan of Care/Patient Progress Review  Outcome: Improving  Alert and oriented x4. Vital signs stable on room air. Up independently. Tolerating regular diet. Aware NPO at midnight. Lung sounds clear. Bowel sound active, + flatus, 1 maroon stool this shift. Voiding adequately. 1 emesis this shift. Neuros intact. CMS intact. Pain to right arm - declined interventions. Nausea managed with PRN zofran and compazine. Tele NSR.

## 2018-09-28 NOTE — ANESTHESIA CARE TRANSFER NOTE
Patient: Rickie Lagos    Procedure(s):  RIGHT TRANSAXILLARY FIRST RIB RESECTION, SCALENECTOMY  - Wound Class: I-Clean    Diagnosis: THORACIC OUTLET SYNDROME   Diagnosis Additional Information: No value filed.    Anesthesia Type:   General     Note:  Airway :Face Mask  Patient transferred to:PACU  Comments: BP: 123/71  Pulse: 54  Resp: 16  SpO2: 97 %  Temp: 36.4  C (97.6  F)    TOF 4/4 with sustained tetany > 5secs. Spontaneous resp with tidal volume >400ml.. Followed commands et purposeful. Strong tongue thrust. Extubated with cuff down. Pt maintains resp. O2 sat > 97%. Simple face mask on with 10l O2.To PACU: VSS, placed on monitors with alarms on. Report given to RN.;       Vitals: (Last set prior to Anesthesia Care Transfer)    CRNA VITALS  9/28/2018 1534 - 9/28/2018 1611      9/28/2018             NIBP: 141/72    NIBP Mean: 107                Electronically Signed By: VANNESSA Hall CRNA  September 28, 2018  4:11 PM

## 2018-09-28 NOTE — OR NURSING
PNDS Criteria met.  Order received to transfer to [facility] for continued recovery.  Hand-off report to Fredy RODRIGUEZ.  To 309-1 per cart; will transport with Capnography monitoring.

## 2018-09-28 NOTE — PROVIDER NOTIFICATION
AMINTA Bonilla @ 21:05 on 9/27/18--Manuela to re-enter Pharmacy consult for Heparin drip and labs.  Danielle Hooks [facility]

## 2018-09-28 NOTE — PROGRESS NOTES
Vascular Surgery Progress Note    S: Headache this morning otherwise fine.  No shortness of breath.    O:   Vitals:  BP  Min: 115/74  Max: 157/106  Temp  Av.9  F (36.6  C)  Min: 97.5  F (36.4  C)  Max: 98.4  F (36.9  C)  Pulse  Av.5  Min: 53  Max: 54  I/O last 3 completed shifts:  In: 2228 [P.O.:480; I.V.:1748]  Out: -     Physical Exam: No change.  Warm arm.  Mild swelling.      Heparin was turned off this morning due to elevated Xa level.    Assessment/Plan: To OR at 1200 hrs. for TOS surgery.  We will keep on low-dose heparin until preinduction.  Tylenol for headache.      Wm. Maryanne MD

## 2018-09-28 NOTE — PLAN OF CARE
Problem: Patient Care Overview  Goal: Plan of Care/Patient Progress Review  Outcome: No Change  Vital signs stable. Alert and oriented. Lung sounds clear. Bowel sounds active and audible, flatus present. Old TPA site gauze clean dry and intact. Pain controlled with PCA. Up independently in room. Tolerating NPO status since midnight. CMS intact, patient denies any numbness or tingling. TELE: sinus presley. Heparin decreased to 12 from 13 per pharmacy orders, recheck ordered for 1030.

## 2018-09-28 NOTE — PLAN OF CARE
Family planning on leaving for lunch during procedure, please call father CRISTO cell with any updates as he can update rest of family. Cell: [phone]    Report given to Preop RN, pt transferred with heparin gtt running at 500u/hr per Dr Madden's instruction.

## 2018-09-28 NOTE — PROGRESS NOTES
POST -OP CHECK    Patient is in recovery.  He is alert and appropriate.  Pain is well controlled.  Gavin-Zapata drain does have some bloody drainage which is expected.    Postoperative chest x-ray reveals near complete removal of the first rib.  We did have a pleural tear at the time of surgery and the Gavin-Zapata drain is up in the axilla and into the pleural cavity with no pneumothorax noted on chest x-ray.    At the time of surgery is felt that very likely the subclavian vein had reoccluded due to the extrinsic compression which we somewhat expected.  I will hold off with restarting the heparin for now.  Patient scheduled for a repeat right arm venogram with Dr. Barajas tomorrow morning and this is been discussed with the patient and family.      Kong Madden MD

## 2018-09-29 ENCOUNTER — APPOINTMENT (OUTPATIENT)
Dept: INTERVENTIONAL RADIOLOGY/VASCULAR | Facility: CLINIC | Age: 28
DRG: 271 | End: 2018-09-29
Attending: SURGERY
Payer: COMMERCIAL

## 2018-09-29 LAB
ERYTHROCYTE [DISTWIDTH] IN BLOOD BY AUTOMATED COUNT: 12 % (ref 10–15)
GLUCOSE BLDC GLUCOMTR-MCNC: 82 MG/DL (ref 70–99)
HCT VFR BLD AUTO: 35.7 % (ref 40–53)
HGB BLD-MCNC: 12.4 G/DL (ref 13.3–17.7)
LMWH PPP CHRO-ACNC: 0.61 IU/ML
MCH RBC QN AUTO: 31.2 PG (ref 26.5–33)
MCHC RBC AUTO-ENTMCNC: 34.7 G/DL (ref 31.5–36.5)
MCV RBC AUTO: 90 FL (ref 78–100)
PLATELET # BLD AUTO: 250 10E9/L (ref 150–450)
RBC # BLD AUTO: 3.97 10E12/L (ref 4.4–5.9)
WBC # BLD AUTO: 9.3 10E9/L (ref 4–11)

## 2018-09-29 PROCEDURE — C1757 CATH, THROMBECTOMY/EMBOLECT: HCPCS

## 2018-09-29 PROCEDURE — 36415 COLL VENOUS BLD VENIPUNCTURE: CPT | Performed by: INTERNAL MEDICINE

## 2018-09-29 PROCEDURE — 27210742 ZZH CATH CR1

## 2018-09-29 PROCEDURE — 00000146 ZZHCL STATISTIC GLUCOSE BY METER IP

## 2018-09-29 PROCEDURE — 05C53ZZ EXTIRPATION OF MATTER FROM RIGHT SUBCLAVIAN VEIN, PERCUTANEOUS APPROACH: ICD-10-PCS | Performed by: RADIOLOGY

## 2018-09-29 PROCEDURE — 21400002 ZZH R&B CCU CICU CRITICAL

## 2018-09-29 PROCEDURE — 25000132 ZZH RX MED GY IP 250 OP 250 PS 637: Performed by: RADIOLOGY

## 2018-09-29 PROCEDURE — 85027 COMPLETE CBC AUTOMATED: CPT | Performed by: RADIOLOGY

## 2018-09-29 PROCEDURE — 25000128 H RX IP 250 OP 636: Performed by: STUDENT IN AN ORGANIZED HEALTH CARE EDUCATION/TRAINING PROGRAM

## 2018-09-29 PROCEDURE — 27210886 ZZH ACCESSORY CR5

## 2018-09-29 PROCEDURE — 27210905 ZZH KIT CR7

## 2018-09-29 PROCEDURE — 85520 HEPARIN ASSAY: CPT | Performed by: INTERNAL MEDICINE

## 2018-09-29 PROCEDURE — 36415 COLL VENOUS BLD VENIPUNCTURE: CPT | Performed by: RADIOLOGY

## 2018-09-29 PROCEDURE — 25000125 ZZHC RX 250

## 2018-09-29 PROCEDURE — 37187 VENOUS MECH THROMBECTOMY: CPT

## 2018-09-29 PROCEDURE — C1769 GUIDE WIRE: HCPCS

## 2018-09-29 PROCEDURE — 25500064 ZZH RX 255 OP 636: Performed by: INTERNAL MEDICINE

## 2018-09-29 PROCEDURE — 25000128 H RX IP 250 OP 636: Performed by: RADIOLOGY

## 2018-09-29 PROCEDURE — C1725 CATH, TRANSLUMIN NON-LASER: HCPCS

## 2018-09-29 PROCEDURE — 25000132 ZZH RX MED GY IP 250 OP 250 PS 637: Performed by: STUDENT IN AN ORGANIZED HEALTH CARE EDUCATION/TRAINING PROGRAM

## 2018-09-29 PROCEDURE — 25000128 H RX IP 250 OP 636

## 2018-09-29 PROCEDURE — 27210845 ZZH DEVICE INFLATION CR5

## 2018-09-29 PROCEDURE — 27210906 ZZH KIT CR8

## 2018-09-29 RX ORDER — HEPARIN SODIUM 1000 [USP'U]/ML
INJECTION, SOLUTION INTRAVENOUS; SUBCUTANEOUS
Status: COMPLETED
Start: 2018-09-29 | End: 2018-09-29

## 2018-09-29 RX ORDER — HEPARIN SODIUM 1000 [USP'U]/ML
2000 INJECTION, SOLUTION INTRAVENOUS; SUBCUTANEOUS ONCE
Status: COMPLETED | OUTPATIENT
Start: 2018-09-29 | End: 2018-09-29

## 2018-09-29 RX ORDER — ACETAMINOPHEN 500 MG
500 TABLET ORAL EVERY 6 HOURS PRN
Status: COMPLETED | OUTPATIENT
Start: 2018-09-29 | End: 2018-10-03

## 2018-09-29 RX ORDER — DIPHENHYDRAMINE HYDROCHLORIDE 50 MG/ML
INJECTION INTRAMUSCULAR; INTRAVENOUS
Status: COMPLETED
Start: 2018-09-29 | End: 2018-09-29

## 2018-09-29 RX ORDER — LIDOCAINE HYDROCHLORIDE 10 MG/ML
1-30 INJECTION, SOLUTION EPIDURAL; INFILTRATION; INTRACAUDAL; PERINEURAL
Status: COMPLETED | OUTPATIENT
Start: 2018-09-29 | End: 2018-09-29

## 2018-09-29 RX ORDER — FENTANYL CITRATE 50 UG/ML
INJECTION, SOLUTION INTRAMUSCULAR; INTRAVENOUS
Status: COMPLETED
Start: 2018-09-29 | End: 2018-09-29

## 2018-09-29 RX ORDER — DIPHENHYDRAMINE HYDROCHLORIDE 50 MG/ML
50 INJECTION INTRAMUSCULAR; INTRAVENOUS ONCE
Status: COMPLETED | OUTPATIENT
Start: 2018-09-29 | End: 2018-09-29

## 2018-09-29 RX ORDER — FENTANYL CITRATE 50 UG/ML
INJECTION, SOLUTION INTRAMUSCULAR; INTRAVENOUS
Status: DISCONTINUED
Start: 2018-09-29 | End: 2018-09-29 | Stop reason: HOSPADM

## 2018-09-29 RX ORDER — LIDOCAINE HYDROCHLORIDE 10 MG/ML
INJECTION, SOLUTION INFILTRATION; PERINEURAL
Status: DISCONTINUED
Start: 2018-09-29 | End: 2018-09-29 | Stop reason: WASHOUT

## 2018-09-29 RX ORDER — LIDOCAINE HYDROCHLORIDE 10 MG/ML
INJECTION, SOLUTION INFILTRATION; PERINEURAL
Status: DISCONTINUED
Start: 2018-09-29 | End: 2018-09-29 | Stop reason: HOSPADM

## 2018-09-29 RX ORDER — FLUMAZENIL 0.1 MG/ML
0.2 INJECTION, SOLUTION INTRAVENOUS
Status: DISCONTINUED | OUTPATIENT
Start: 2018-09-29 | End: 2018-09-29 | Stop reason: HOSPADM

## 2018-09-29 RX ORDER — SODIUM CHLORIDE 9 MG/ML
INJECTION, SOLUTION INTRAVENOUS CONTINUOUS
Status: DISCONTINUED | OUTPATIENT
Start: 2018-09-29 | End: 2018-09-30

## 2018-09-29 RX ORDER — NALOXONE HYDROCHLORIDE 0.4 MG/ML
.1-.4 INJECTION, SOLUTION INTRAMUSCULAR; INTRAVENOUS; SUBCUTANEOUS
Status: DISCONTINUED | OUTPATIENT
Start: 2018-09-29 | End: 2018-09-29 | Stop reason: HOSPADM

## 2018-09-29 RX ORDER — IOPAMIDOL 612 MG/ML
50 INJECTION, SOLUTION INTRAVASCULAR ONCE
Status: COMPLETED | OUTPATIENT
Start: 2018-09-29 | End: 2018-09-29

## 2018-09-29 RX ORDER — FENTANYL CITRATE 50 UG/ML
25-50 INJECTION, SOLUTION INTRAMUSCULAR; INTRAVENOUS EVERY 5 MIN PRN
Status: DISCONTINUED | OUTPATIENT
Start: 2018-09-29 | End: 2018-09-29 | Stop reason: HOSPADM

## 2018-09-29 RX ORDER — DEXTROSE MONOHYDRATE 25 G/50ML
25-50 INJECTION, SOLUTION INTRAVENOUS
Status: DISCONTINUED | OUTPATIENT
Start: 2018-09-29 | End: 2018-10-05

## 2018-09-29 RX ORDER — NICOTINE POLACRILEX 4 MG
15-30 LOZENGE BUCCAL
Status: DISCONTINUED | OUTPATIENT
Start: 2018-09-29 | End: 2018-10-05

## 2018-09-29 RX ADMIN — SODIUM CHLORIDE: 9 INJECTION, SOLUTION INTRAVENOUS at 17:57

## 2018-09-29 RX ADMIN — IOPAMIDOL 45 ML: 612 INJECTION, SOLUTION INTRAVENOUS at 10:52

## 2018-09-29 RX ADMIN — SODIUM CHLORIDE, POTASSIUM CHLORIDE, SODIUM LACTATE AND CALCIUM CHLORIDE: 600; 310; 30; 20 INJECTION, SOLUTION INTRAVENOUS at 01:07

## 2018-09-29 RX ADMIN — MIDAZOLAM HYDROCHLORIDE 0.5 MG: 1 INJECTION, SOLUTION INTRAMUSCULAR; INTRAVENOUS at 10:23

## 2018-09-29 RX ADMIN — HEPARIN SODIUM 2000 UNITS: 1000 INJECTION, SOLUTION INTRAVENOUS; SUBCUTANEOUS at 10:13

## 2018-09-29 RX ADMIN — FENTANYL CITRATE 25 MCG: 50 INJECTION INTRAMUSCULAR; INTRAVENOUS at 10:38

## 2018-09-29 RX ADMIN — Medication 0.5 MG: at 03:14

## 2018-09-29 RX ADMIN — FENTANYL CITRATE 25 MCG: 50 INJECTION INTRAMUSCULAR; INTRAVENOUS at 10:26

## 2018-09-29 RX ADMIN — HEPARIN SODIUM 2000 UNITS: 1000 INJECTION, SOLUTION INTRAVENOUS; SUBCUTANEOUS at 10:20

## 2018-09-29 RX ADMIN — KETOROLAC TROMETHAMINE 30 MG: 30 INJECTION, SOLUTION INTRAMUSCULAR at 02:51

## 2018-09-29 RX ADMIN — KETOROLAC TROMETHAMINE 30 MG: 30 INJECTION, SOLUTION INTRAMUSCULAR at 14:57

## 2018-09-29 RX ADMIN — MIDAZOLAM HYDROCHLORIDE 0.5 MG: 1 INJECTION, SOLUTION INTRAMUSCULAR; INTRAVENOUS at 10:26

## 2018-09-29 RX ADMIN — Medication 0.5 MG: at 08:00

## 2018-09-29 RX ADMIN — ACETAMINOPHEN 500 MG: 500 TABLET, FILM COATED ORAL at 17:56

## 2018-09-29 RX ADMIN — LIDOCAINE HYDROCHLORIDE 5 ML: 10 INJECTION, SOLUTION INFILTRATION; PERINEURAL at 10:43

## 2018-09-29 RX ADMIN — MIDAZOLAM HYDROCHLORIDE 1 MG: 1 INJECTION, SOLUTION INTRAMUSCULAR; INTRAVENOUS at 10:05

## 2018-09-29 RX ADMIN — MIDAZOLAM HYDROCHLORIDE 1 MG: 1 INJECTION, SOLUTION INTRAMUSCULAR; INTRAVENOUS at 09:57

## 2018-09-29 RX ADMIN — METHOCARBAMOL 750 MG: 750 TABLET ORAL at 14:20

## 2018-09-29 RX ADMIN — FENTANYL CITRATE 25 MCG: 50 INJECTION INTRAMUSCULAR; INTRAVENOUS at 10:24

## 2018-09-29 RX ADMIN — ACETAMINOPHEN 975 MG: 325 TABLET, FILM COATED ORAL at 12:53

## 2018-09-29 RX ADMIN — DIPHENHYDRAMINE HYDROCHLORIDE 50 MG: 50 INJECTION INTRAMUSCULAR; INTRAVENOUS at 10:18

## 2018-09-29 RX ADMIN — Medication 0.5 MG: at 05:26

## 2018-09-29 RX ADMIN — MIDAZOLAM HYDROCHLORIDE 0.5 MG: 1 INJECTION, SOLUTION INTRAMUSCULAR; INTRAVENOUS at 10:38

## 2018-09-29 RX ADMIN — DIPHENHYDRAMINE HYDROCHLORIDE 50 MG: 50 INJECTION, SOLUTION INTRAMUSCULAR; INTRAVENOUS at 10:18

## 2018-09-29 RX ADMIN — FENTANYL CITRATE 50 MCG: 50 INJECTION INTRAMUSCULAR; INTRAVENOUS at 09:53

## 2018-09-29 RX ADMIN — TRAMADOL HYDROCHLORIDE 100 MG: 50 TABLET, COATED ORAL at 12:52

## 2018-09-29 RX ADMIN — ACETAMINOPHEN 975 MG: 325 TABLET, FILM COATED ORAL at 05:26

## 2018-09-29 RX ADMIN — Medication 0.5 MG: at 11:28

## 2018-09-29 RX ADMIN — HEPARIN SODIUM 5000 UNITS: 5000 INJECTION, SOLUTION INTRAVENOUS; SUBCUTANEOUS at 02:49

## 2018-09-29 RX ADMIN — Medication 0.5 MG: at 22:55

## 2018-09-29 RX ADMIN — LIDOCAINE HYDROCHLORIDE 5 ML: 10 INJECTION, SOLUTION EPIDURAL; INFILTRATION; INTRACAUDAL; PERINEURAL at 10:43

## 2018-09-29 RX ADMIN — TRAMADOL HYDROCHLORIDE 100 MG: 50 TABLET, COATED ORAL at 18:52

## 2018-09-29 RX ADMIN — HEPARIN SODIUM 10000 UNITS: 10000 INJECTION, SOLUTION INTRAVENOUS; SUBCUTANEOUS at 09:58

## 2018-09-29 RX ADMIN — ACETAMINOPHEN 975 MG: 325 TABLET, FILM COATED ORAL at 21:47

## 2018-09-29 RX ADMIN — FENTANYL CITRATE 50 MCG: 50 INJECTION INTRAMUSCULAR; INTRAVENOUS at 10:06

## 2018-09-29 RX ADMIN — HEPARIN SODIUM 1500 UNITS/HR: 10000 INJECTION, SOLUTION INTRAVENOUS at 11:19

## 2018-09-29 RX ADMIN — METHOCARBAMOL 750 MG: 750 TABLET ORAL at 21:50

## 2018-09-29 RX ADMIN — Medication 0.5 MG: at 01:07

## 2018-09-29 ASSESSMENT — ACTIVITIES OF DAILY LIVING (ADL)
ADLS_ACUITY_SCORE: 9

## 2018-09-29 NOTE — PLAN OF CARE
Problem: Patient Care Overview  Goal: Plan of Care/Patient Progress Review  Outcome: Improving  Pt A&O x4. VSS. Up with SBA.  Refused capno. R axillary incision dressing CDI. Dressing on R upper arm from venogram CDI with minimal saturation. ALICIA drain to suction, stripped once per shift per order. On continuous heparin drip. Pain managed with Roboxin q4 hours PRN and Tramadol q6 hours PRN and ice packs. Will continue to monitor. Ambulates with SBA. Tolerating regular diet.

## 2018-09-29 NOTE — PROGRESS NOTES
HOSPITALIST CONSULT CHART CHECK:     Hospitalist service was consulted for cross coverage only. We will peripherally follow and chart check throughout the week. Vascular Medicine is rounding this weekend.      - For vascular medical concerns during business hours M-F, call the [facility] at [phone] to have the rounding/on call Vascular Medicine (NOT VASCULAR SURGERY) MD paged.     - After business hours M-F, for medical concerns on this patient, please page hospitalist staff.     - For vascular surgical questions, please page the appropriate surgeon (primary vascular surgeon or on call vascular surgeon) based upon the time of day.     VANNESSA Torres, CNP  Hospitalist Service, House Officer  [facility]     Text Page  Pager: [phone]    No charge, chart check only.

## 2018-09-29 NOTE — PROVIDER NOTIFICATION
AMINTA Bonilla @ 19:22 on 9/28/18--D/C low fixed rate Heparin drip for now and start Heparin 5000 units sq q8h at 02:00 on 9/29/18 as ordered.  Danielle Hooks RPH

## 2018-09-29 NOTE — OP NOTE
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      PROCEDURE:     1.  Right transaxillary first rib resection.   a.  Anterior and middle scalenectomy.   b.  Mobilization of right subclavian vein.      SURGEON: Kong Madden MD      FIRST ASSISTANT:  Kwesi Bonilla MD (Vascular Fellow)      : Davon Rios MD ([facility] Surgery Resident)      ANESTHESIA:  General.      PREOPERATIVE MEDICATIONS:  Ancef 2 grams IV.      INDICATIONS:  A 27-year-old patient who presented with thrombosis of right axillary and subclavian vein felt due to vasogenic thoracic outlet syndrome.  He had undergone successful lytic therapy and balloon venoplasty.  He was placed on Xarelto with a planned surgical decompression of the thoracic outlet 2 weeks later.  However, he presented to the clinics 2 days ago with re-thrombosis of the axillary and subclavian vein despite use of the anticoagulation and limiting his overhead arm activities.  The patient has undergone lysis of the clot with repeat Possis thrombectomy and balloon venoplasty.  He still has an obvious extrinsic compression of the subclavian vein with very little flow despite these measures.  We therefore felt that he should undergo more urgent decompression of the thoracic outlet.  The patient has been kept on heparin up to the time of surgery following completion of his interventional procedure yesterday.      DESCRIPTION OF PROCEDURE:  The patient was brought to the operating room, induced under general anesthesia and orally intubated.  He was placed in a partial right lateral thoracotomy position with appropriate padding.  Calf pneumatic compression boots were used.  The entire right arm and shoulder area were  prepped and draped in the usual fashion, leaving the arm free. Timeout was called and the sites were identified.      A low right axillary incision was made.  Dissection was carried down to the chest wall.  There was noted to be an arterial and vein coming out of the second intercostal space, which was ligated and divided between 3-0 silk suture.  The second intercostal brachial nerve was not identified.  The patient is thin, but had a very deep location of his first rib.  With elevation of the arm, deep retractors and loupe magnification along with the light source, we dissected down until we identified the first rib.  The subclavian artery was visualized and was unremarkable, as was the brachial plexus.  However, the subclavian vein had inflammatory scar tissue as one would have expected from the DVT and had a high likelihood of having rethrombosed.  We tried to minimally manipulate any retraction on the subclavian vein due to the high propensity of rethrombosis since we had held the heparin in preinduction.      ANTERIOR SCALENECTOMY:  We dissected down until we could identify both the superior and inferior borders of the first rib.  These were scored with electrocautery.  We identified the anterior scalene muscle which had a very tight encroachment on the subclavian vein between the clavicle and subclavius tendon as the most likely cause of the extrinsic compression causing the thrombosis.  Right angle clamp was placed posteriorly and anteriorly to the anterior scalene muscle, which was very well-developed.  Under direct visualization, we then cauterized and divided the anterior scalene muscle.  This had attachments to the pleura, which were taken down with the aid of electrocautery and Metzenbaum scissors to allow the anterior scalene muscle to retract above the subclavian artery and vein.  A pleural tear did occur at this time due to the very tight adherence of the anterior scalene muscle to the pleura, but we  needed to mobilize this completely to allow retraction and prevent any extrinsic compression.  We then directed our attention to the middle scalene muscle.  With good visualization of the brachial plexus and some retraction on the lower portion of the brachial plexus, we divided with electrocautery the middle scalene muscle with good hemostasis off the first rib in its entirety.      DIVISION OF SUBCLAVIUS TENDON:  Due to the encroachment on the subclavian vein causing the thrombosis, we wanted to completely mobilize this and remove any source of extrinsic compression.  With good visualization of the subclavian vein, we then divided the attachments of the subclavius tendon to the first rib very medially.  A very small amount of bleeding occurred when we were working on the posterior-superior border of the medial rib.  This was stopped with electrocautery and I put a small amount of Surgicel with excellent hemostasis being noted.      FIRST RIB RESECTION: With the aid of a Cotton retractor, we mobilized the rib both anteriorly and posteriorly.  A 45 degree  was used to cut the rib posteriorly with good visualization of the lower portion of the brachial plexus.  Due to the patient's anatomy, the  would not allow placement anteriorly.  We therefore used a Leksell rongeur to come across the more anterior border of the first rib, removing the first rib.  We then used the Leksell posteriorly on the rib until we were almost at the transverse process.  Electrocautery was used to stop bone marrow bleeding.  Anteriorly, we used the Leksell until we were medial to the lateral aspect of the previously divided subclavius tendon, again to prevent encroachment.  Good hemostasis was noted.      MOBILIZATION OF SUBCLAVIAN VEIN: With good visualization of the vein and use of Metzenbaum scissors and electrocautery, we freed this up on its medial and lateral borders and posterior border into the thoracic inlet to make  sure there was no evidence of any extrinsic compression remaining.      We had a very dry field noted.  A 1.5 cm pleural tear occurred with mobilization of the anterior scalene muscle as described above.  No evidence of any extrinsic compression of the neurovascular structures remained.  A #19 round Gavin-Zapata drain was brought through an inferior axillary stab incision and placed into the operative field with the tip going into the pleural cavity through the pleural tear, and this was hooked to bulb suction.      Wounds were infiltrated with 0.5% Marcaine for post-analgesia along with Toradol 30 mg IV.  We closed the axillary incision in layers with interrupted 3-0 Vicryl suture and the skin with 4-0 Monocryl in subcuticular fashion.  Gauze dressings were applied.      The patient tolerated the procedure well.      ESTIMATED BLOOD LOSS:  Less than 10 mL.      COMPLICATIONS:  None.      Operation was more difficult due to the patient's body habitus.  We also did extensive mobilization of the subclavian vein to make sure there was no remaining extrinsic compression.  The vein was more adherent than most cases, but this is consistent with his recent deep venous thrombosis.         FRITZ EMMANUEL MD             D: 2018   T: 2018   MT:       Name:     ERLIN LIN   MRN:      [id number]        Account:        [id number]   :      1990           Procedure Date: 2018      Document: [id number]       cc: [facility]

## 2018-09-29 NOTE — PLAN OF CARE
Problem: Patient Care Overview  Goal: Plan of Care/Patient Progress Review  Outcome: No Change  Patient is A&O, VSS on RA. Taking IV dilaudid, Toradol, Tylenol for pain. Also using ice packs. Up SBA. Voiding. Incision covered, intact: Small amount of drainage outlined; unchanged. Capno, WNL throughout night. ALICIA drain: 20 ml out. Denies numbness/tingling. Pulse to R arm WNL. NPO since midnight for procedure today.

## 2018-09-29 NOTE — PLAN OF CARE
Problem: Patient Care Overview  Goal: Plan of Care/Patient Progress Review  Outcome: Therapy, progress towards functional goals is fair  Pt arrived on the unit at 1825hrs from PACU.A/OX4. On capno, otherwise VSS. R axillary incision drsg ,CDI. ALICIA patent with small bloody output. LS dim. CMS intact. Pain managed with Robaxin, tramadol, Toradol and Dilaudid. Ambulated to the bathroom , adequate UOP.

## 2018-09-29 NOTE — PROGRESS NOTES
Vascular Surgery Progress Note:  POD#1 right first rib resection /scalenectomy    S: Sore with deep breathing as expected.  Otherwise comfortable with use of narcotics.    O:   Vitals:  BP  Min: 119/69  Max: 155/85  Temp  Av.8  F (36.6  C)  Min: 97.2  F (36.2  C)  Max: 98.3  F (36.8  C)  I/O last 3 completed shifts:  In: 2344 [P.O.:240; I.V.:2104]  Out: 105 [Drains:100; Blood:5]    Physical Exam: Up in chair.  Breathing easily.                             ALICIA= 100 ml since surgery but only 20 ml last shift. (serous)                              Chest= clear.      Assessment/Plan: Doing well.  Held off heparin post procedure.                                 Right arm venogram with Dr. Barajas this morning.                                     His subclavian vein is rethrombosed we will plan Possis/PTA.                                     Full dose IV heparin following this.                                  Leave Gavin-Zapata in.  Incentive spirometer.  PCD.      Wm. Maryanne MD

## 2018-09-29 NOTE — IR NOTE
Interventional Radiology Intra-procedural Nursing Note    Patient Name: Rickie Lagos  Medical Record Number: [id number]  Today's Date: September 29, 2018    Start Time: 1003  End of procedure time: 1043  Procedure: right arm venogram, possis thrombectomy and angioplasty  Report given to: loulou foreman  Time pt departs:  1106  Other Notes:  Pt tolerated procedure well. Vss. Back on room air with o2 sats >94% remains connected to etCo2 monitoring, respirations regular, unlabored. He is drowsy responds readily. Heparin gtt started at 1500 units/hr per verbal order by Dr. Hernandez.  Procedure meds given: Versed 3.5mg, Fentanyl 175mcg, Benadryl 50mg IV,   Heparin 4,000 units Iv    Kimberly Vital RN

## 2018-09-29 NOTE — PROGRESS NOTES
Patient currently in the IR lab Nayan will examined the patient once he is back in his room andg his venogram reviewed his notes but I did not examine him personally yet    Aurelio Ricketts MD

## 2018-09-30 ENCOUNTER — APPOINTMENT (OUTPATIENT)
Dept: GENERAL RADIOLOGY | Facility: CLINIC | Age: 28
DRG: 271 | End: 2018-09-30
Attending: SURGERY
Payer: COMMERCIAL

## 2018-09-30 ENCOUNTER — APPOINTMENT (OUTPATIENT)
Dept: CT IMAGING | Facility: CLINIC | Age: 28
DRG: 271 | End: 2018-09-30
Attending: NURSE PRACTITIONER
Payer: COMMERCIAL

## 2018-09-30 ENCOUNTER — APPOINTMENT (OUTPATIENT)
Dept: GENERAL RADIOLOGY | Facility: CLINIC | Age: 28
DRG: 271 | End: 2018-09-30
Attending: NURSE PRACTITIONER
Payer: COMMERCIAL

## 2018-09-30 LAB
ERYTHROCYTE [DISTWIDTH] IN BLOOD BY AUTOMATED COUNT: 12.2 % (ref 10–15)
GLUCOSE BLDC GLUCOMTR-MCNC: 145 MG/DL (ref 70–99)
GLUCOSE SERPL-MCNC: 103 MG/DL (ref 70–99)
HCT VFR BLD AUTO: 20.8 % (ref 40–53)
HGB BLD-MCNC: 7.1 G/DL (ref 13.3–17.7)
INR PPP: 1.21 (ref 0.86–1.14)
LMWH PPP CHRO-ACNC: 0.56 IU/ML
LMWH PPP CHRO-ACNC: 0.71 IU/ML
MCH RBC QN AUTO: 30.7 PG (ref 26.5–33)
MCHC RBC AUTO-ENTMCNC: 34.1 G/DL (ref 31.5–36.5)
MCV RBC AUTO: 90 FL (ref 78–100)
PLATELET # BLD AUTO: 241 10E9/L (ref 150–450)
PLATELET # BLD AUTO: 316 10E9/L (ref 150–450)
RBC # BLD AUTO: 2.31 10E12/L (ref 4.4–5.9)
WBC # BLD AUTO: 14.8 10E9/L (ref 4–11)

## 2018-09-30 PROCEDURE — 25000128 H RX IP 250 OP 636: Performed by: NURSE PRACTITIONER

## 2018-09-30 PROCEDURE — 25000128 H RX IP 250 OP 636: Performed by: INTERNAL MEDICINE

## 2018-09-30 PROCEDURE — 25000125 ZZHC RX 250: Performed by: STUDENT IN AN ORGANIZED HEALTH CARE EDUCATION/TRAINING PROGRAM

## 2018-09-30 PROCEDURE — 25000132 ZZH RX MED GY IP 250 OP 250 PS 637: Performed by: SURGERY

## 2018-09-30 PROCEDURE — 00000146 ZZHCL STATISTIC GLUCOSE BY METER IP

## 2018-09-30 PROCEDURE — 36415 COLL VENOUS BLD VENIPUNCTURE: CPT | Performed by: NURSE PRACTITIONER

## 2018-09-30 PROCEDURE — 99207 ZZC APP CREDIT; MD BILLING SHARED VISIT: CPT | Performed by: NURSE PRACTITIONER

## 2018-09-30 PROCEDURE — 71045 X-RAY EXAM CHEST 1 VIEW: CPT

## 2018-09-30 PROCEDURE — 99291 CRITICAL CARE FIRST HOUR: CPT | Performed by: INTERNAL MEDICINE

## 2018-09-30 PROCEDURE — 71260 CT THORAX DX C+: CPT

## 2018-09-30 PROCEDURE — 25000132 ZZH RX MED GY IP 250 OP 250 PS 637: Performed by: INTERNAL MEDICINE

## 2018-09-30 PROCEDURE — 40000986 XR CHEST PORT 1 VW

## 2018-09-30 PROCEDURE — 93010 ELECTROCARDIOGRAM REPORT: CPT | Performed by: INTERNAL MEDICINE

## 2018-09-30 PROCEDURE — 25000128 H RX IP 250 OP 636

## 2018-09-30 PROCEDURE — 25000128 H RX IP 250 OP 636: Performed by: SURGERY

## 2018-09-30 PROCEDURE — 93005 ELECTROCARDIOGRAM TRACING: CPT

## 2018-09-30 PROCEDURE — 25000125 ZZHC RX 250: Performed by: INTERNAL MEDICINE

## 2018-09-30 PROCEDURE — 25000128 H RX IP 250 OP 636: Performed by: RADIOLOGY

## 2018-09-30 PROCEDURE — 85520 HEPARIN ASSAY: CPT | Performed by: NURSE PRACTITIONER

## 2018-09-30 PROCEDURE — 20000003 ZZH R&B ICU

## 2018-09-30 PROCEDURE — 85520 HEPARIN ASSAY: CPT | Performed by: INTERNAL MEDICINE

## 2018-09-30 PROCEDURE — 71045 X-RAY EXAM CHEST 1 VIEW: CPT | Mod: 77

## 2018-09-30 PROCEDURE — 25000128 H RX IP 250 OP 636: Performed by: STUDENT IN AN ORGANIZED HEALTH CARE EDUCATION/TRAINING PROGRAM

## 2018-09-30 PROCEDURE — 85049 AUTOMATED PLATELET COUNT: CPT | Performed by: INTERNAL MEDICINE

## 2018-09-30 PROCEDURE — 82947 ASSAY GLUCOSE BLOOD QUANT: CPT | Performed by: INTERNAL MEDICINE

## 2018-09-30 PROCEDURE — 25000132 ZZH RX MED GY IP 250 OP 250 PS 637: Performed by: STUDENT IN AN ORGANIZED HEALTH CARE EDUCATION/TRAINING PROGRAM

## 2018-09-30 PROCEDURE — 85027 COMPLETE CBC AUTOMATED: CPT | Performed by: NURSE PRACTITIONER

## 2018-09-30 PROCEDURE — 85610 PROTHROMBIN TIME: CPT | Performed by: NURSE PRACTITIONER

## 2018-09-30 PROCEDURE — 36415 COLL VENOUS BLD VENIPUNCTURE: CPT | Performed by: INTERNAL MEDICINE

## 2018-09-30 PROCEDURE — 25000132 ZZH RX MED GY IP 250 OP 250 PS 637: Performed by: RADIOLOGY

## 2018-09-30 PROCEDURE — 99232 SBSQ HOSP IP/OBS MODERATE 35: CPT | Performed by: INTERNAL MEDICINE

## 2018-09-30 RX ORDER — AMOXICILLIN 250 MG
1-2 CAPSULE ORAL 2 TIMES DAILY
Status: DISCONTINUED | OUTPATIENT
Start: 2018-09-30 | End: 2018-10-05

## 2018-09-30 RX ORDER — NALOXONE HYDROCHLORIDE 0.4 MG/ML
.1-.4 INJECTION, SOLUTION INTRAMUSCULAR; INTRAVENOUS; SUBCUTANEOUS
Status: DISCONTINUED | OUTPATIENT
Start: 2018-09-30 | End: 2018-09-30

## 2018-09-30 RX ORDER — IOPAMIDOL 755 MG/ML
80 INJECTION, SOLUTION INTRAVASCULAR ONCE
Status: COMPLETED | OUTPATIENT
Start: 2018-09-30 | End: 2018-09-30

## 2018-09-30 RX ORDER — LORAZEPAM 0.5 MG/1
0.5 TABLET ORAL EVERY 8 HOURS PRN
Status: DISCONTINUED | OUTPATIENT
Start: 2018-09-30 | End: 2018-10-06 | Stop reason: HOSPADM

## 2018-09-30 RX ORDER — ONDANSETRON 4 MG/1
4 TABLET, ORALLY DISINTEGRATING ORAL EVERY 6 HOURS PRN
Status: DISCONTINUED | OUTPATIENT
Start: 2018-09-30 | End: 2018-09-30

## 2018-09-30 RX ORDER — ONDANSETRON 2 MG/ML
4 INJECTION INTRAMUSCULAR; INTRAVENOUS EVERY 6 HOURS PRN
Status: DISCONTINUED | OUTPATIENT
Start: 2018-09-30 | End: 2018-09-30

## 2018-09-30 RX ORDER — IBUPROFEN 600 MG/1
600 TABLET, FILM COATED ORAL EVERY 6 HOURS PRN
Status: DISCONTINUED | OUTPATIENT
Start: 2018-09-30 | End: 2018-10-06 | Stop reason: HOSPADM

## 2018-09-30 RX ORDER — OXYCODONE HYDROCHLORIDE 5 MG/1
5-10 TABLET ORAL EVERY 4 HOURS PRN
Status: DISCONTINUED | OUTPATIENT
Start: 2018-09-30 | End: 2018-10-06 | Stop reason: HOSPADM

## 2018-09-30 RX ORDER — LORAZEPAM 2 MG/ML
0.5 INJECTION INTRAMUSCULAR ONCE
Status: COMPLETED | OUTPATIENT
Start: 2018-09-30 | End: 2018-09-30

## 2018-09-30 RX ORDER — SODIUM CHLORIDE 9 MG/ML
INJECTION, SOLUTION INTRAVENOUS CONTINUOUS
Status: DISCONTINUED | OUTPATIENT
Start: 2018-09-30 | End: 2018-10-03

## 2018-09-30 RX ADMIN — TRAMADOL HYDROCHLORIDE 100 MG: 50 TABLET, COATED ORAL at 07:40

## 2018-09-30 RX ADMIN — ONDANSETRON 4 MG: 2 INJECTION INTRAMUSCULAR; INTRAVENOUS at 19:31

## 2018-09-30 RX ADMIN — LORAZEPAM 0.5 MG: 2 INJECTION INTRAMUSCULAR; INTRAVENOUS at 21:25

## 2018-09-30 RX ADMIN — SODIUM CHLORIDE, POTASSIUM CHLORIDE, SODIUM LACTATE AND CALCIUM CHLORIDE 1000 ML: 600; 310; 30; 20 INJECTION, SOLUTION INTRAVENOUS at 22:52

## 2018-09-30 RX ADMIN — HEPARIN SODIUM 1500 UNITS/HR: 10000 INJECTION, SOLUTION INTRAVENOUS at 04:04

## 2018-09-30 RX ADMIN — OXYCODONE HYDROCHLORIDE 5 MG: 5 TABLET ORAL at 14:16

## 2018-09-30 RX ADMIN — METHOCARBAMOL 750 MG: 750 TABLET ORAL at 11:05

## 2018-09-30 RX ADMIN — Medication: at 22:11

## 2018-09-30 RX ADMIN — ACETAMINOPHEN 500 MG: 500 TABLET, FILM COATED ORAL at 11:06

## 2018-09-30 RX ADMIN — OXYCODONE HYDROCHLORIDE 5 MG: 5 TABLET ORAL at 09:12

## 2018-09-30 RX ADMIN — TRAMADOL HYDROCHLORIDE 100 MG: 50 TABLET, COATED ORAL at 01:45

## 2018-09-30 RX ADMIN — SODIUM CHLORIDE: 9 INJECTION, SOLUTION INTRAVENOUS at 07:40

## 2018-09-30 RX ADMIN — OXYCODONE HYDROCHLORIDE 5 MG: 5 TABLET ORAL at 09:46

## 2018-09-30 RX ADMIN — IOPAMIDOL 80 ML: 755 INJECTION, SOLUTION INTRAVENOUS at 21:07

## 2018-09-30 RX ADMIN — Medication 0.5 MG: at 21:21

## 2018-09-30 RX ADMIN — SODIUM CHLORIDE, PRESERVATIVE FREE 70 ML: 5 INJECTION INTRAVENOUS at 21:07

## 2018-09-30 RX ADMIN — SODIUM CHLORIDE, POTASSIUM CHLORIDE, SODIUM LACTATE AND CALCIUM CHLORIDE 1000 ML: 600; 310; 30; 20 INJECTION, SOLUTION INTRAVENOUS at 19:34

## 2018-09-30 RX ADMIN — SODIUM CHLORIDE: 9 INJECTION, SOLUTION INTRAVENOUS at 22:29

## 2018-09-30 RX ADMIN — ACETAMINOPHEN 975 MG: 325 TABLET, FILM COATED ORAL at 05:40

## 2018-09-30 RX ADMIN — IBUPROFEN 600 MG: 600 TABLET, FILM COATED ORAL at 18:32

## 2018-09-30 RX ADMIN — OXYCODONE HYDROCHLORIDE 10 MG: 5 TABLET ORAL at 13:16

## 2018-09-30 RX ADMIN — OXYCODONE HYDROCHLORIDE 10 MG: 5 TABLET ORAL at 18:31

## 2018-09-30 RX ADMIN — ONDANSETRON 4 MG: 2 INJECTION INTRAMUSCULAR; INTRAVENOUS at 22:30

## 2018-09-30 RX ADMIN — LIDOCAINE HYDROCHLORIDE 1 ML: 10 INJECTION, SOLUTION INFILTRATION; PERINEURAL at 22:30

## 2018-09-30 RX ADMIN — IBUPROFEN 600 MG: 600 TABLET, FILM COATED ORAL at 12:19

## 2018-09-30 RX ADMIN — LORAZEPAM 0.5 MG: 0.5 TABLET ORAL at 15:20

## 2018-09-30 RX ADMIN — METHOCARBAMOL 750 MG: 750 TABLET ORAL at 04:01

## 2018-09-30 RX ADMIN — SODIUM CHLORIDE, POTASSIUM CHLORIDE, SODIUM LACTATE AND CALCIUM CHLORIDE 1000 ML: 600; 310; 30; 20 INJECTION, SOLUTION INTRAVENOUS at 20:30

## 2018-09-30 ASSESSMENT — ACTIVITIES OF DAILY LIVING (ADL)
ADLS_ACUITY_SCORE: 9

## 2018-09-30 NOTE — PROGRESS NOTES
Spoke with patient and family about the importance of ambulating, sitting up in a chair. Discussed constipation, prevention of pneumonia, urinary retention etc. Patient continues to refused to sit up in a chair, ambulate in the hallway. Discussed with patient about double voiding to help with his urinary retention and discussed the PRN orders for straight cath. Patient refusing to be straight cathed at this time. Heat pack applied to his back and front chest- patient now sleeping.

## 2018-09-30 NOTE — PROGRESS NOTES
[facility]  Vascular Medicine Progress Note          Assessment and Plan:   Active Problems:      Recurrent RUE DVT despite therapeutic AC with Xarelto due to extrinsic venous compression secondary to venous TOS (H)    Assessment: now S/P lysis / mechanical thrombectomy     first rib resection Done, maintain on therapeutic AC overnight with IV UFH  Patient was complaining of shortness of breath this morning with chest pain clinically no signs of pneumothorax/hemothorax patient has bilateral air entry both sides of the chest, x-ray was done showed no evidence of pneumo or hemothorax only evidence of pleural tear at the upper right lung zone  We will continue with IV heparin for now  Pain control  Patient is very anxious he is on meds               Interval History:   doing well;  cp, sob, n/v/d, or abd pain.              Review of Systems:   The 10 point Review of Systems is negative other than noted in the HPI             Medications:       acetaminophen  975 mg Oral Q8H     sodium chloride (PF)  3 mL Intracatheter Q8H                  Physical Exam:     Patient Vitals for the past 24 hrs:   BP Temp Temp src Pulse Heart Rate Resp SpO2 Weight   09/30/18 0746 128/78 97.7  F (36.5  C) Oral 59 - 16 99 % -   09/30/18 0700 - - - - - - - 168 lb 14 oz (76.6 kg)   09/30/18 0245 - - - - - 16 - -   09/30/18 0145 - - - - - 16 - -   09/29/18 2310 - - - - - 16 - -   09/29/18 2255 - - - - - 16 98 % -   09/29/18 2147 - - - - - 16 98 % -   09/29/18 2120 129/70 98.3  F (36.8  C) Oral - 58 16 96 % -   09/29/18 1945 - - - - - 16 97 % -   09/29/18 1852 - - - - - 16 100 % -   09/29/18 1756 - - - - - 18 97 % -   09/29/18 1518 135/82 98  F (36.7  C) Oral - 59 18 97 % -   09/29/18 1400 (!) 139/100 - - 56 - - - -   09/29/18 1330 - - - - - 16 - -   09/29/18 1300 (!) 147/92 - - 63 - 16 96 % -   09/29/18 1253 - - - - - 16 - -   09/29/18 1230 (!) 147/93 - - - 51 - - -   09/29/18 1205 (!) 136/92 - - (!) 48 - - 98 % -   09/29/18  1150 (!) 131/93 - - 50 - - - -   09/29/18 1130 132/84 - - (!) 48 - - 99 % -   09/29/18 1120 142/88 - - 54 - 16 97 % -   09/29/18 1055 - - - - (!) 47 14 95 % -   09/29/18 1050 131/86 - - - 55 11 95 % -   09/29/18 1045 123/83 - - - 55 12 99 % -   09/29/18 1040 123/79 - - - 52 11 99 % -   09/29/18 1035 130/90 - - - (!) 49 16 99 % -   09/29/18 1030 128/84 - - - 53 18 99 % -   09/29/18 1025 (!) 129/92 - - - 54 30 99 % -   09/29/18 1020 112/85 - - - 56 23 99 % -   09/29/18 1015 122/73 - - - 58 20 99 % -     Wt Readings from Last 4 Encounters:   09/30/18 168 lb 14 oz (76.6 kg)   09/26/18 172 lb 12.8 oz (78.4 kg)   09/21/18 169 lb 4.8 oz (76.8 kg)   09/16/18 169 lb (76.7 kg)     No intake or output data in the 24 hours ending 09/27/18 1147  Constitutional: normal  Eyes: normal  ENT: normal  Neck: Supple, symmetrical, trachea midline, no adenopathy, thyroid symmetric, not enlarged and no tenderness, skin normal.  Hematologic / Lymphatic: normal  Back: normal  Lungs: No increased work of breathing, good air exchange, clear to auscultation bilaterally, no crackles or wheezing.  Cardiovascular: Regular rate and rhythm, normal S1 and S2, no S3 or S4, and no murmur noted.  Chest / Breast: normal  Abdomen: normal  Genitourinary: normal  Musculoskeletal: No redness, warmth, or swelling of the joints.  Full range of motion noted.  Motor strength is 5 out of 5 all extremities bilaterally.  Tone is normal.  Neurologic: Awake, alert, oriented to name, place and time.  Cranial nerves II-XII are grossly intact.  Motor is 5 out of 5 bilaterally.  Cerebellar finger to nose, heel to shin intact.  Sensory is intact.  Babinski down going, Romberg negative, and gait is normal.  Neuropsychiatric: normal  Skin: normal           Data:     Results for orders placed or performed during the hospital encounter of 09/26/18 (from the past 24 hour(s))   IR Upper Extremity Venogram Right    Narrative    INTERVENTIONAL RADIOLOGY RIGHT UPPER EXTREMITY  VENOGRAM  9/29/2018  10:54 AM     HISTORY:  27-year-old male with right subclavian vein deep venous  thrombus status post catheter-directed thrombolysis. Yesterday, the  patient underwent a right transaxillary first rib resection, anterior  and middle scalenectomy, and mobilization of the right subclavian  vein.    COMPARISON: Venogram dated 9/27/2018.    FINDINGS: After obtaining informed consent, the patient was placed in  a supine position on the fluoroscopy table. The right arm was prepped  and draped in the usual sterile manner. 1% lidocaine was injected for  local anesthesia. Ultrasound was used to evaluate and document patency  of the right basilic vein. Under sterile ultrasound guidance, access  into the right basilic vein was obtained. An image was saved for  documentation. An 8 [place] vascular sheath was placed. A right upper  extremity venogram was performed.    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    INTERVENTION: A KENNY 1 catheter was used to manipulate a guidewire  across the occluded right subclavian vein. Mechanical thrombectomy  using an 8 [place] AngioJet device was performed to remove thrombus  from the subclavian vein. Subsequently, balloon angioplasty of the  subclavian vein was performed with a 12 mm balloon. Followup venogram  showed improvement in luminal diameter and improved flow with  persistent nonocclusive thrombus within the mid right subclavian vein.  This was further intervened upon with the 8 [place] AngioJet device.  Subsequent venography _______ mild residual thrombus within the mid  right subclavian vein but overall good flow and washout of contrast  from the right subclavian vein. In addition, there was no significant  filling of previously seen collaterals in the right upper extremity  which had drained into the right internal jugular vein. This was felt  to be a satisfactory endpoint for the procedure.    The right arm sheath was removed. Pressure was  held at the puncture  site for 10 minutes with good hemostasis.    I determined this patient to be an appropriate candidate for the  planned sedation and procedure and reassessed the patient immediately  prior to sedation and procedure. The patient tolerated the procedure  well. There were no immediate postprocedure complications. The  patient's vital signs were monitored by radiology nursing staff under  my supervision and remained stable throughout the study.     Medications: 3.5 mg Versed, 175 mcg fentanyl, heparin 4000 units,  Benadryl 50 mg    Sedation time: 40 minutes    Fluoroscopy time: 6.1 minutes    Total fluoroscopy dose: 32 mGy    Contrast: 45 mL Isovue      Impression    IMPRESSION: Post mechanical thrombectomy and balloon angioplasty of  the right subclavian vein, there is now satisfactory flow through the  right subclavian vein. Patient was restarted on anticoagulation with  intravenous heparin high-intensity protocol. Patient will likely  undergo repeat venography of the right subclavian vein in two weeks.   CBC with platelets   Result Value Ref Range    WBC 9.3 4.0 - 11.0 10e9/L    RBC Count 3.97 (L) 4.4 - 5.9 10e12/L    Hemoglobin 12.4 (L) 13.3 - 17.7 g/dL    Hematocrit 35.7 (L) 40.0 - 53.0 %    MCV 90 78 - 100 fl    MCH 31.2 26.5 - 33.0 pg    MCHC 34.7 31.5 - 36.5 g/dL    RDW 12.0 10.0 - 15.0 %    Platelet Count 250 150 - 450 10e9/L   Heparin 10a Level   Result Value Ref Range    Heparin 10A Level 0.61 IU/mL   Platelet count   Result Value Ref Range    Platelet Count 241 150 - 450 10e9/L   Heparin Xa (10a) Level   Result Value Ref Range    Heparin 10A Level 0.71 IU/mL   Glucose   Result Value Ref Range    Glucose 103 (H) 70 - 99 mg/dL

## 2018-09-30 NOTE — PLAN OF CARE
Problem: Patient Care Overview  Goal: Plan of Care/Patient Progress Review  Outcome: No Change  A/O x4. AVSS on RA. LS diminished. C/o pain R incision site, ultram and roboxin given, ice applied. R upper arm dressing from venogram CDI. R axillary dressing marked, no movement. ALICIA drain to suction, stripped per order, bright red output. Heparin drip infusing at 15ml and NS at 75ml. Up w/SBA to BR. Regular diet. Will continue to monitor.

## 2018-09-30 NOTE — PROVIDER NOTIFICATION
Patient stating his pain is 11/10 despite receiving Robaxin, Oxycodone, ibuprofen and tylenol. Refusing to walk d/t to his pain. Patient feeling anxious. Bladder scan for 900 ml.  Wondering if patient can get orders for Ativan?     --- Ok for Ativan PRN and straight cath for greater than 300 ml.

## 2018-09-30 NOTE — PROGRESS NOTES
Vascular Surgery Progress Note    S: Still very sore related to the surgery.  Complaining of pleuritic chest pain that is worse this morning and feeling more short of breath.    O:   Vitals:  BP  Min: 112/85  Max: 147/92  Temp  Av  F (36.7  C)  Min: 97.7  F (36.5  C)  Max: 98.3  F (36.8  C)  Pulse  Av  Min: 48  Max: 63  I/O last 3 completed shifts:  In: 240 [P.O.:240]  Out: 40 [Drains:40]    Physical Exam: Alert and appropriate.                            Chest= some rhonchi noted in the right lower lung fields but breath                           sounds sounds are present.  Left chest is clear.                             No axillary swelling.                             ALICIA with total of 40 mL last 24 hours      Reviewed venogram from yesterday performed by Dr. Barajas.  As expected the right subclavian vein had reoccluded.  He was able to pass a guidewire through the newer thrombus.  AngioJet was used for mechanical thrombolysis followed byA 10 mm balloon venoplasty.  Very good result was noted with no obvious residual extrinsic compression good flow through the subclavian vein.    Post procedure hemoglobin= 12.4  Morning platelet= 241      Patient was initiated on heparin following the procedure and this is continued.  Heparin 10 A level= 0.71 this morning      Assessment/Plan: #1.  Successful recanalization of right subclavian vein following thoracic outlet decompression surgery.  On intravenous heparin with eventual plan to go over to Pradaxa.                                  #2.  Patient is still significantly sore which can be expected from the surgical procedure.  However, he is noting some more breathing issues this morning.  No evidence of extravasation on the completion venogram film from yesterday.  However, there is certainly a chance of bleeding from the surgical site associated with the heparinization.  Chest x-ray will be taken now.  Patient is not ready to go home.  Will leave ALICIA drain in  which has minimal output and keep on intravenous heparin.      . MD Maryanne      ADDENDUM: Chest x-ray just completed.  Pleural hematoma noted in apex of lung.  No evidence of hemothorax or pneumothorax or atelectasis.      Kong Madden MD

## 2018-09-30 NOTE — PROGRESS NOTES
HOSPITALIST CONSULT CHART CHECK:      Hospitalist service was consulted for cross coverage only. We will peripherally follow and chart check throughout the week. Vascular Medicine is rounding this weekend.       - For vascular medical concerns during business hours M-F, call the [facility] at [phone] to have the rounding/on call Vascular Medicine (NOT VASCULAR SURGERY) MD paged.      - After business hours M-F, for medical concerns on this patient, please page hospitalist staff.      - For vascular surgical questions, please page the appropriate surgeon (primary vascular surgeon or on call vascular surgeon) based upon the time of day.      VANNESSA Torres, CNP  Hospitalist Service, House Officer  [facility]      Text Page  Pager: [phone]     No charge, chart check only.

## 2018-09-30 NOTE — PLAN OF CARE
Problem: Patient Care Overview  Goal: Individualization & Mutuality  Outcome: Improving  Pt A&O x4. VSS. Up with SBA. R axillary incision dressing CDI. Dressing on R upper arm from venogram today CDI with minimal saturation. ALICIA drain to suction, stripped once per shift per order. On continuous heparin drip, hep10a tomorrow am. NS at 75cc/hr.Pain managed with Roboxin PRN and Tramadol q6 hours PRN and ice packs, scheduled tylenol. Will continue to monitor. Tolerating regular diet.

## 2018-09-30 NOTE — PLAN OF CARE
Problem: Patient Care Overview  Goal: Plan of Care/Patient Progress Review  Outcome: No Change   A&O x4. VSS, with bradycardia. Up with SBA.  R upper arm dressing from venogram with small drainage. ALICIA drain stripped once per order, minimal output. Pt states pain 11/10, given oxy 10mg PRN q6, tylenol, robaxin, pain not controlled with these. Given new ice packs q2 hours. Heparin running at 14mL/hr. Urinary retention. Pt declines straight catheter at this time. Ambulated once per shift, refused to move and get up due to pain. Education completed on the importance of moving and getting up in chair. Last BM 9/28/18, new orders for senna twice a day. Poor appetite, nutrition consult ordered.

## 2018-10-01 ENCOUNTER — APPOINTMENT (OUTPATIENT)
Dept: GENERAL RADIOLOGY | Facility: CLINIC | Age: 28
DRG: 271 | End: 2018-10-01
Attending: SURGERY
Payer: COMMERCIAL

## 2018-10-01 LAB
ABO + RH BLD: NORMAL
ABO + RH BLD: NORMAL
ANION GAP SERPL CALCULATED.3IONS-SCNC: 9 MMOL/L (ref 3–14)
BLD GP AB SCN SERPL QL: NORMAL
BLD PROD TYP BPU: NORMAL
BLD UNIT ID BPU: 0
BLOOD BANK CMNT PATIENT-IMP: NORMAL
BLOOD PRODUCT CODE: NORMAL
BPU ID: NORMAL
BUN SERPL-MCNC: 12 MG/DL (ref 7–30)
CALCIUM SERPL-MCNC: 8 MG/DL (ref 8.5–10.1)
CHLORIDE SERPL-SCNC: 101 MMOL/L (ref 94–109)
CO2 SERPL-SCNC: 27 MMOL/L (ref 20–32)
CREAT SERPL-MCNC: 0.8 MG/DL (ref 0.66–1.25)
ERYTHROCYTE [DISTWIDTH] IN BLOOD BY AUTOMATED COUNT: 12.4 % (ref 10–15)
GFR SERPL CREATININE-BSD FRML MDRD: >90 ML/MIN/1.7M2
GLUCOSE BLDC GLUCOMTR-MCNC: 125 MG/DL (ref 70–99)
GLUCOSE SERPL-MCNC: 144 MG/DL (ref 70–99)
HCT VFR BLD AUTO: 21.1 % (ref 40–53)
HCT VFR BLD AUTO: 26.7 % (ref 40–53)
HGB BLD-MCNC: 6.7 G/DL (ref 13.3–17.7)
HGB BLD-MCNC: 6.8 G/DL (ref 13.3–17.7)
HGB BLD-MCNC: 7.3 G/DL (ref 13.3–17.7)
HGB BLD-MCNC: 9.1 G/DL (ref 13.3–17.7)
LMWH PPP CHRO-ACNC: 0.57 IU/ML
MCH RBC QN AUTO: 30.8 PG (ref 26.5–33)
MCHC RBC AUTO-ENTMCNC: 34.6 G/DL (ref 31.5–36.5)
MCV RBC AUTO: 89 FL (ref 78–100)
NUM BPU REQUESTED: 4
PLATELET # BLD AUTO: 325 10E9/L (ref 150–450)
POTASSIUM SERPL-SCNC: 4.7 MMOL/L (ref 3.4–5.3)
RBC # BLD AUTO: 2.37 10E12/L (ref 4.4–5.9)
SODIUM SERPL-SCNC: 137 MMOL/L (ref 133–144)
SPECIMEN EXP DATE BLD: NORMAL
TRANSFUSION STATUS PATIENT QL: NORMAL
WBC # BLD AUTO: 14.8 10E9/L (ref 4–11)

## 2018-10-01 PROCEDURE — 36415 COLL VENOUS BLD VENIPUNCTURE: CPT | Performed by: SURGERY

## 2018-10-01 PROCEDURE — 25000128 H RX IP 250 OP 636: Performed by: NURSE PRACTITIONER

## 2018-10-01 PROCEDURE — P9016 RBC LEUKOCYTES REDUCED: HCPCS | Performed by: SURGERY

## 2018-10-01 PROCEDURE — 85027 COMPLETE CBC AUTOMATED: CPT | Performed by: SURGERY

## 2018-10-01 PROCEDURE — 85014 HEMATOCRIT: CPT | Performed by: SURGERY

## 2018-10-01 PROCEDURE — 80048 BASIC METABOLIC PNL TOTAL CA: CPT | Performed by: SURGERY

## 2018-10-01 PROCEDURE — 85018 HEMOGLOBIN: CPT | Performed by: SURGERY

## 2018-10-01 PROCEDURE — 25000132 ZZH RX MED GY IP 250 OP 250 PS 637: Performed by: INTERNAL MEDICINE

## 2018-10-01 PROCEDURE — 25000125 ZZHC RX 250

## 2018-10-01 PROCEDURE — 40000986 XR CHEST PORT 1 VW

## 2018-10-01 PROCEDURE — 25000128 H RX IP 250 OP 636: Performed by: STUDENT IN AN ORGANIZED HEALTH CARE EDUCATION/TRAINING PROGRAM

## 2018-10-01 PROCEDURE — 20000003 ZZH R&B ICU

## 2018-10-01 PROCEDURE — 00000146 ZZHCL STATISTIC GLUCOSE BY METER IP

## 2018-10-01 PROCEDURE — 25000132 ZZH RX MED GY IP 250 OP 250 PS 637: Performed by: STUDENT IN AN ORGANIZED HEALTH CARE EDUCATION/TRAINING PROGRAM

## 2018-10-01 PROCEDURE — 99232 SBSQ HOSP IP/OBS MODERATE 35: CPT | Performed by: INTERNAL MEDICINE

## 2018-10-01 RX ORDER — SODIUM CHLORIDE, SODIUM LACTATE, POTASSIUM CHLORIDE, CALCIUM CHLORIDE 600; 310; 30; 20 MG/100ML; MG/100ML; MG/100ML; MG/100ML
INJECTION, SOLUTION INTRAVENOUS CONTINUOUS
Status: DISCONTINUED | OUTPATIENT
Start: 2018-10-01 | End: 2018-10-02

## 2018-10-01 RX ADMIN — LORAZEPAM 0.5 MG: 0.5 TABLET ORAL at 10:51

## 2018-10-01 RX ADMIN — METHOCARBAMOL 750 MG: 750 TABLET ORAL at 10:51

## 2018-10-01 RX ADMIN — PROCHLORPERAZINE EDISYLATE 5 MG: 5 INJECTION INTRAMUSCULAR; INTRAVENOUS at 02:47

## 2018-10-01 RX ADMIN — SODIUM CHLORIDE, POTASSIUM CHLORIDE, SODIUM LACTATE AND CALCIUM CHLORIDE: 600; 310; 30; 20 INJECTION, SOLUTION INTRAVENOUS at 22:38

## 2018-10-01 RX ADMIN — LIDOCAINE HYDROCHLORIDE 10 ML: 20 JELLY TOPICAL at 03:06

## 2018-10-01 RX ADMIN — ONDANSETRON 4 MG: 2 INJECTION INTRAMUSCULAR; INTRAVENOUS at 20:29

## 2018-10-01 RX ADMIN — ACETAMINOPHEN 975 MG: 325 TABLET, FILM COATED ORAL at 18:10

## 2018-10-01 RX ADMIN — SENNOSIDES AND DOCUSATE SODIUM 1 TABLET: 8.6; 5 TABLET ORAL at 22:09

## 2018-10-01 RX ADMIN — SENNOSIDES AND DOCUSATE SODIUM 1 TABLET: 8.6; 5 TABLET ORAL at 10:10

## 2018-10-01 RX ADMIN — SODIUM CHLORIDE, POTASSIUM CHLORIDE, SODIUM LACTATE AND CALCIUM CHLORIDE: 600; 310; 30; 20 INJECTION, SOLUTION INTRAVENOUS at 03:05

## 2018-10-01 RX ADMIN — SODIUM CHLORIDE, POTASSIUM CHLORIDE, SODIUM LACTATE AND CALCIUM CHLORIDE: 600; 310; 30; 20 INJECTION, SOLUTION INTRAVENOUS at 12:44

## 2018-10-01 RX ADMIN — PROCHLORPERAZINE EDISYLATE 5 MG: 5 INJECTION INTRAMUSCULAR; INTRAVENOUS at 22:46

## 2018-10-01 RX ADMIN — ACETAMINOPHEN 975 MG: 325 TABLET, FILM COATED ORAL at 10:11

## 2018-10-01 ASSESSMENT — ACTIVITIES OF DAILY LIVING (ADL)
ADLS_ACUITY_SCORE: 9

## 2018-10-01 ASSESSMENT — PAIN DESCRIPTION - DESCRIPTORS
DESCRIPTORS: ACHING

## 2018-10-01 NOTE — PLAN OF CARE
Problem: Surgery Nonspecified (Adult)  Goal: Signs and Symptoms of Listed Potential Problems Will be Absent, Minimized or Managed (Surgery Nonspecified)  Signs and symptoms of listed potential problems will be absent, minimized or managed by discharge/transition of care (reference Surgery Nonspecified (Adult) CPG).  Outcome: Therapy, progress toward functional goals is gradual  Able to get pt. Pain in control better this pm with robaxin and increased dilaudid PCA.  Ice also applied, hgb still low after one unit of rbc's, 2 more ordered per MD.  Good urine output.  Eating more this pm.  Cont. To monitor, ronal one unit for rbc's well.

## 2018-10-01 NOTE — CODE/RAPID RESPONSE
[facility]  House JAIME RRT Note  9/30/2018   Time Called: 1915  RRT called for: hypotension, tachycardia, dyspnea  Code Status: Prior - presumably full code    Assessment & Plan   I was paged to the bedside to evaluate Mr. Rickie Lagos for an acute episode of hypotension, tachycardia, and dyspnea. Patient is POD #2 from right 1st rib resection. Patient was ill appearing, hypotensive with sBPs in the low 80's upper 70's. On pulmonary exam, patient had absent breath sounds to right chest therefore a liter of LR was ordered along with ECG and chest x-ray. ECG showed some demand ischemia with elevated HR. Chest x-ray was indicative of increasing hemothorax. Vascular surgery resident/fellow, Dr Way, was notified. I spoke with Dr. Houston of critical-care medicine about this patient and his potential for acute deterioration. I ordered a stat chest CT with contrast per Dr. Houston's recommendation. Vascular resident was notified of updated plan (transfer to ICU, get imaging, labs), she stated Dr. Barbosa was on his way in. A stat CBC, heparin 10a, and INR were drawn shortly before leaving for CT scan. Patient was fluid responsive initially but became less-so as the RRT progressed. Patient was pale, diaphoretic, and hypotensive prior to going to the scanner, he appeared to be in florid shock. Dr. Barbosa with vascular surgery met us in the CT scanner, where he was updated on imaging and assessment findings. Patient was transported to the ICU without issue. Chest tube placed by Dr. Barbosa while in the intensive care unit.    Diagnosis:  -- obstructive shock 2/2 right hemothorax (POD #2 right 1st rib resection)  -- hemorrhagic shock? CBC drawn in the RRT lost by lab    Interventions ordered/provided:  -- 12 lead ECG - demand ischemia noted  -- Chest x-ray - expansion of hemothorax from 0900 this AM  -- STAT chest CT with contrast to evaluate for hemorrhage -   -- STAT CBC, heparin 10a level, INR  -- stopped  heparin infusion  -- transfer    At the conclusion of this RRT patient was transferred to the CT scanner then the ICU where chest tube placement was performed by Dr. Barbosa. Intensivist Dr. Houston was present at the bedside during transfer to ICU.    Interval History     Mr. Rickie Lagos is a 27 year old male who was admitted on 9/26/2018 for right rib resection 2/2 TOS.    His history is significant for:  Past Medical History:   Diagnosis Date     DVT of axillary vein, acute right (H) 09/16/2018     TOS (thoracic outlet syndrome) 09/16/2018     Past Surgical History:   Procedure Laterality Date     HC TOOTH EXTRACTION W/FORCEP Bilateral     wisdom tooth extraction     XR EXTREMITY VENOGRAM INJ RIGHT Right 09/16/2018       Allergies   Allergies   Allergen Reactions     Amoxicillin Rash       Physical Exam   Physical Exam   Constitutional: He is oriented to person, place, and time. He appears to be writhing in pain. He appears unhealthy. He has a sickly appearance. He appears distressed.   HENT:   Head: Normocephalic and atraumatic.   Eyes: Pupils are equal, round, and reactive to light.   Neck: Normal range of motion. No tracheal deviation present.   Cardiovascular: Regular rhythm, normal heart sounds and normal pulses.  Tachycardia present.    Pulmonary/Chest: Accessory muscle usage present. No stridor. Tachypnea noted. He is in respiratory distress. He has decreased breath sounds in the right upper field, the right middle field and the right lower field.   Abdominal: Soft. He exhibits no distension.   Musculoskeletal: Normal range of motion.   Neurological: He is alert and oriented to person, place, and time. GCS score is 15.   Skin: He is diaphoretic. There is pallor.   Psychiatric: Mood and affect normal.     Vital Signs with Ranges:  Temp:  [97.5  F (36.4  C)-98.3  F (36.8  C)] 98.2  F (36.8  C)  Pulse:  [] 148  Heart Rate:  [] 137  Resp:  [14-18] 18  BP: ()/(44-79) 79/57  SpO2:  [96  %-100 %] 96 %  I/O last 3 completed shifts:  In: 1641 [P.O.:240; I.V.:1401]  Out: 900 [Urine:875; Drains:25]    Data     EKG:  Interpreted by VANNESSA Lynn CNP  Time reviewed: 1930  Symptoms at time of EKG: Shortness of breath   Rhythm: normal sinus   Rate: Tachycardia  Axis: Normal  Ectopy: none  Conduction: normal  ST Segments/ T Waves: ST Segment Depression aVR and Inferior  Q Waves: none  Comparison to prior: More tachycardic than prior  Clinical Impression: non-specific EKG    ABG:  -No lab results found in last 7 days.    Troponin:    No lab results found.    IMAGING: (X-ray/CT/MRI)   Recent Results (from the past 24 hour(s))   XR Chest Port 1 View    Narrative    CHEST PORTABLE ONE VIEW September 30, 2018 9:45 AM     HISTORY: Shortness of breath status post rib resection.    COMPARISON: Frontal chest x-ray 9/28/2018.      Impression    IMPRESSION: Postoperative changes consistent with resection of the  right first rib again noted. A surgical drain in place in the right  upper chest is again noted. There is a small amount of pleural fluid  at the apex of the right chest cavity. There is no pneumothorax. There  is no pleural effusion on the left. Lungs are clear. Heart size is  normal.    GORGE OLIVAREZ MD       CBC with Diff:  Recent Labs   Lab Test  09/30/18   0730  09/29/18   1150   09/26/18   1930   WBC   --   9.3   < >  7.1   HGB   --   12.4*   < >  13.4   MCV   --   90   < >  90   PLT  241  250   < >  278   INR   --    --    --   1.33*    < > = values in this interval not displayed.      No results found for: RETICABSCT  No results found for: RETP    Lactic Acid:    No results found for: LACT  No results found for: LACTS     Comprehensive Metabolic Panel:    Recent Labs  Lab 09/30/18  0730 09/27/18  0423   NA  --  142   POTASSIUM  --  4.0   CHLORIDE  --  109   CO2  --  26   ANIONGAP  --  7   * 91   BUN  --  11   CR  --  0.88   GFRESTIMATED  --  >90   GFRESTBLACK  --  >90   JC  --  8.6        INR:    Recent Labs   Lab Test  09/26/18 1930   INR  1.33*       D-DIMER:  No components found for: DDIMER    BNP:  No results found for: BNP    UA:  No results for input(s): COLOR, APPEARANCE, URINEGLC, URINEBILI, URINEKETONE, SG, UBLD, URINEPH, PROTEIN, UROBILINOGEN, NITRITE, LEUKEST, RBCU, WBCU in the last 168 hours.    Time Spent on this Encounter   I spent 90 minutes (1915 - 2045) of critical care time on the unit/floor managing the care of Rickie Lagos. Upon evaluation, this patient had a high probability of imminent or life-threatening deterioration due to shock, which required my direct attention, intervention, and personal management. 100% of my time was spent at the bedside counseling the patient and/or coordinating care regarding services listed in this note.    VANNESSA Lynn, CNP  Hospitalist - House JAIME  Text Page  [phone])

## 2018-10-01 NOTE — PROGRESS NOTES
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 Initial CXR with no signs of pneumothorax/hemothorax, only evidence of pleural tear at the upper right lung zone. Follow-up emergent CT later in evening with large hemothorax. Chest tube placed with 1600 ml blood return.   -Pain under better control now.   -Urinary retention s/p mcrae catheter placement 9/30    Plan:   -Continue off Heparin for now.   -Chest tube per Vascular Surgery.  -Pain control  -Patient is very anxious - on meds   -Will need to trial voiding again in next 1-2 days when up and moving more.                Interval History:   doing well;  cp, sob, n/v/d, or abd pain. Some pain in back near incision on right. Feeling better today than yesterday. Mcrae in due to retention. Chest tube placed last night.               Review of Systems:   The 10 point Review of Systems is negative other than noted in the HPI             Medications:       acetaminophen  975 mg Oral Q8H     senna-docusate  1-2 tablet Oral BID     sodium chloride (PF)  3 mL Intracatheter Q8H                  Physical Exam:     Patient Vitals for the past 24 hrs:   BP Temp Temp src Pulse Heart Rate Resp SpO2 Weight   10/01/18 1000 - 98.3  F (36.8  C) Axillary - - - - -   10/01/18 0630 109/61 - - - 123 23 97 % -   10/01/18 0600 118/72 - - - 112 15 96 % 69.2 kg (152 lb 8.9 oz)   10/01/18 0530 109/73 - - - 112 15 97 % -   10/01/18 0515 - - - - 115 15 97 % -   10/01/18 0500 110/72 - - - 111 16 96 % -   10/01/18 0445 - - - - 114 18 97 % -   10/01/18 0430 107/66 - - - 122 20 97 % -   10/01/18 0415 - - - - 120 16 97 %  -   10/01/18 0400 99/55 98.3  F (36.8  C) Axillary - 118 25 96 % -   10/01/18 0345 - - - - 114 15 96 % -   10/01/18 0330 97/65 - - - 126 17 96 % -   10/01/18 0315 99/61 - - - 135 27 95 % -   10/01/18 0300 109/68 - - - 124 26 96 % -   10/01/18 0245 111/64 - - - 135 (!) 35 97 % -   10/01/18 0230 114/65 - - - 131 28 96 % -   10/01/18 0215 115/64 - - - 129 28 97 % -   10/01/18 0200 112/72 - - - - - - -   10/01/18 0145 114/69 - - - 114 27 100 % -   10/01/18 0130 117/73 - - - 112 20 100 % -   10/01/18 0115 112/66 - - - 111 28 100 % -   10/01/18 0100 112/73 - - - 112 22 100 % -   10/01/18 0045 107/72 - - - 118 (!) 31 100 % -   10/01/18 0030 117/78 - - - 117 (!) 34 100 % -   10/01/18 0015 111/69 - - - 117 20 100 % -   10/01/18 0000 107/75 98.3  F (36.8  C) Axillary - 116 22 100 % -   09/30/18 2345 106/62 - - - 123 26 100 % -   09/30/18 2330 105/71 - - - 116 18 100 % -   09/30/18 2315 109/67 - - - 113 28 100 % -   09/30/18 2300 114/62 - - - 114 27 100 % -   09/30/18 2245 111/72 - - - 109 20 100 % -   09/30/18 2230 108/62 - - - 113 24 100 % -   09/30/18 2215 102/58 - - - 112 19 99 % -   09/30/18 2200 103/60 - - - 124 24 96 % -   09/30/18 2145 110/59 - - - 131 30 98 % -   09/30/18 2130 98/55 - - - - - - -   09/30/18 2120 110/69 - - - 126 30 92 % -   09/30/18 2115 100/42 - - - 151 26 (!) 81 % -   09/30/18 2110 98/63 - - - - - - -   09/30/18 2035 (!) 84/60 - - 149 - - - -   09/30/18 2020 99/61 - - 130 - - - -   09/30/18 2005 98/65 - - - 126 - - -   09/30/18 1957 94/55 - - - 130 - - -   09/30/18 1952 (!) 76/58 - - - 127 - - -   09/30/18 1940 (!) 87/50 - - - 137 - - -   09/30/18 1930 97/56 - - - 135 - - -   09/30/18 1923 (!) 79/57 98.2  F (36.8  C) Oral 148 - 18 96 % -   09/30/18 1832 - - - - - 16 - -   09/30/18 1509 - - - - - - 97 % -   09/30/18 1500 - - - - - 16 - -   09/30/18 1416 - - - - - 16 - -   09/30/18 1246 - - - - - 16 - -   09/30/18 1200 - - - - - 16 - -   09/30/18 1115 134/79 97.5  F (36.4  C) Oral 67 - 16 100 % -      Wt Readings from Last 4 Encounters:   10/01/18 69.2 kg (152 lb 8.9 oz)   09/26/18 78.4 kg (172 lb 12.8 oz)   09/21/18 76.8 kg (169 lb 4.8 oz)   09/16/18 76.7 kg (169 lb)     No intake or output data in the 24 hours ending 09/27/18 1147  Constitutional: normal  Eyes: normal  ENT: normal  Neck: Supple, symmetrical  Lungs: No increased work of breathing, good air exchange, chest tube in place with bloody output.   Cardiovascular: Regular rate and rhythm, normal S1 and S2, no S3 or S4, and no murmur noted.  Abdomen: normal  Genitourinary: mcrae catheter in place with good urine output.   Musculoskeletal: No redness, warmth, or swelling of the joints.  Full range of motion noted.  Motor strength is 5 out of 5 all extremities bilaterally.  Tone is normal.  Neurologic: Awake, alert, oriented to name, place and time.  Cranial nerves II-XII are grossly intact.  Motor is 5 out of 5 bilaterally.  Cerebellar finger to nose, heel to shin intact.  Sensory is intact.  Babinski down going, Romberg negative, and gait is normal.  Neuropsychiatric: normal  Skin: Surgical site dressed.            Data:     Results for orders placed or performed during the hospital encounter of 09/26/18 (from the past 24 hour(s))   Heparin 10a Level   Result Value Ref Range    Heparin 10A Level 0.56 IU/mL   EKG 12-lead, tracing only   Result Value Ref Range    Interpretation ECG Click View Image link to view waveform and result    XR Chest 1 View    Narrative    CHEST ONE VIEW   9/30/2018 7:43 PM     HISTORY: EKG changes.    COMPARISON: X-ray from 9:40 AM this morning.      Impression    IMPRESSION: There are prominent changes in the right chest with near  complete opacification of the right hemithorax likely a combination of  pleural fluid and lung collapse or infiltrate. Left lung is clear.  Persistent subcutaneous emphysema in the right chest wall. There is a  tube overlying the apex of the right chest.    LAST SPICER MD   Heparin 10a Level    Result Value Ref Range    Heparin 10A Level 0.57 IU/mL   INR   Result Value Ref Range    INR 1.21 (H) 0.86 - 1.14   CT Chest w Contrast    Narrative    CT CHEST WITH CONTRAST   9/30/2018 9:10 PM     HISTORY: Hemothorax.     TECHNIQUE: 80 mL Isovue-370. Radiation dose for this scan was reduced  using automated exposure control, adjustment of the mA and/or kV  according to patient size, or iterative reconstruction technique.    COMPARISON: Chest CT from 9/16/2018.    FINDINGS: There is a chest tube in the apex of the right lung. There  is a large heterogeneous pleural effusion likely representing a  hemothorax. This results in near complete collapse of the right lung  with some aerated lung in the upper lobe anteriorly. There is  subcutaneous air within the chest wall. This causes some mild right to  left mediastinal shift. The left lung appears clear.      Impression    IMPRESSION:  1. Large amount of fluid in the right pleural space that has  heterogeneous density suggesting blood/hemothorax as suggested in the  patient's history. This causes near complete collapse of the right  lung with some small amounts of aerated lung in the anterior right  upper lobe.  2. There is a right-sided chest tube in place in the right chest apex.  3. There is mild right to left mediastinal shift.    LAST SPICER MD   Glucose by meter   Result Value Ref Range    Glucose 145 (H) 70 - 99 mg/dL   XR Chest Port 1 View    Narrative    CHEST PORTABLE ONE VIEW   9/30/2018 10:05 PM     HISTORY: Status post right chest tube placement.    COMPARISON: Chest x-ray from 1940 hours.      Impression    IMPRESSION: The smaller right apical chest tube has been removed and a  right chest tube is in place that extends just across the midline in  the mid chest. Persistent large pleural effusion or hemothorax.  Previous right first rib resection. No evidence of pneumothorax.  Moderate amount of subcutaneous air in the chest wall. No significant  change  in right chest opacity.    LAST SPICER MD   CBC (1200)   Result Value Ref Range    WBC 14.8 (H) 4.0 - 11.0 10e9/L    RBC Count 2.31 (L) 4.4 - 5.9 10e12/L    Hemoglobin 7.1 (L) 13.3 - 17.7 g/dL    Hematocrit 20.8 (L) 40.0 - 53.0 %    MCV 90 78 - 100 fl    MCH 30.7 26.5 - 33.0 pg    MCHC 34.1 31.5 - 36.5 g/dL    RDW 12.2 10.0 - 15.0 %    Platelet Count 316 150 - 450 10e9/L   CBC with platelets   Result Value Ref Range    WBC 14.8 (H) 4.0 - 11.0 10e9/L    RBC Count 2.37 (L) 4.4 - 5.9 10e12/L    Hemoglobin 7.3 (L) 13.3 - 17.7 g/dL    Hematocrit 21.1 (L) 40.0 - 53.0 %    MCV 89 78 - 100 fl    MCH 30.8 26.5 - 33.0 pg    MCHC 34.6 31.5 - 36.5 g/dL    RDW 12.4 10.0 - 15.0 %    Platelet Count 325 150 - 450 10e9/L   Basic metabolic panel   Result Value Ref Range    Sodium 137 133 - 144 mmol/L    Potassium 4.7 3.4 - 5.3 mmol/L    Chloride 101 94 - 109 mmol/L    Carbon Dioxide 27 20 - 32 mmol/L    Anion Gap 9 3 - 14 mmol/L    Glucose 144 (H) 70 - 99 mg/dL    Urea Nitrogen 12 7 - 30 mg/dL    Creatinine 0.80 0.66 - 1.25 mg/dL    GFR Estimate >90 >60 mL/min/1.7m2    GFR Estimate If Black >90 >60 mL/min/1.7m2    Calcium 8.0 (L) 8.5 - 10.1 mg/dL   Hemoglobin   Result Value Ref Range    Hemoglobin 6.7 (LL) 13.3 - 17.7 g/dL

## 2018-10-01 NOTE — CONSULTS
"Critical care:    Asked by NP Danyel to assess pt for new onset hypotension and tachycardia.    CC: hypotension/tachycardia    HPI:  27M with pmh of R brachial vein dvt due to thoracic outlet syndrome (recurrant despite compliance with AC) now underwent operative release via 1st rib resection, and scalenectomy on  9/28.  Procedure complicated by pleural tear and development of a small fluid collection at the approximate site of the tear was noted on cxr this AM.    This evening, he is noted to have new-onset tachycardia to 140s, relative hypotension 90s/50s, and persistent R chest pain.  On my visit he is comfortable in bed.  Mildly dyspneic.  Continued chest/arm pain.  No dizzyness/lightheadeness.  No abd pain, n/v, numbness/weakness.    Pmh/psh/meds/all/sh/fh reviewed and as indicated below.    ROS:  Negative on 10 point ROS except as noted in hpi.    Physical exam:  BP (!) 84/60  Pulse 149  Temp 98.2  F (36.8  C) (Oral)  Resp 18  Ht 1.93 m (6' 4\")  Wt 76.6 kg (168 lb 14 oz)  SpO2 96%  BMI 20.56 kg/m2  No pressors.  On nasal cannula.  Gen:  Mild distress (chest/arm pain)// HEENT:  PERRL, nose/ears grossly normal // Neck:  Supple // Lymph : no cervical adenopathy // chest : asymmetric chest wall excursion (no excursion on R), unlabored, no breath sounds on R // cor:  rrr no m/r/g // abd s/nt/nd // extr:  wwp x4, no edema // neuro:  Awake, alert, rapid fluent appropriate speech, good str/sens x4 // skin:  No obvious rash    Labs (personally reviewed/interpreted):  From this AM:   hgb 12.4.  pltlts 250 ok.  Creat 0.88 ok. INR 1.33 ok.     CXR (personally reviewed/interpreted):  New R sided white-out.  L side remains clear.    CT chest (personally reviewed/interpreted):  Large R sided fluid collection, heterogenous, suggestive of hemothorax with mild R->L mediastinal displacement.  No apparent active extravasation of contrast to suggest active bleeding.    A/p:  27M POD 2 s/p operative release of R thoracic " outlet syndrome.  Now presents with new onset shock and tachycardia, imaging findings c/w R hemothorax.    1.  Shock:  Likely hemorrhagic vs less likely obstructive due to tension hemothorax.  Fluid resusucitation underway, recommend aggressive transfusion strategy as well.  Stat hgb pending.  Pltlts and INR ok.  Holding heparin drip.    2.  Hemothorax:  D/w Dr. Barbosa of vascular surgery at bedside.  He is going to place a chest tube for drainage.  I discussed with him the possibility of worsened hemorrhage due to release of tamponade effect (despite lack of active extravasation on CT scan), he agrees that they will be ready to move to the OR quickly for hemorrhage control if needed.  Holding heparin drip as above.    3.  Hypoxemia:  Now on nasal cannula whereas had been on room air earlier in day.  Suspect this is due to shunt from R lung collapse.  Breathing acceptably on nc for now.  Continue supportive measures.    D/w LESLY Montaño of hospitalist service and Dr. Barbosa of vascular surgery service.    Critical care time 45 min.          Past Medical History:   Diagnosis Date     DVT of axillary vein, acute right (H) 09/16/2018     TOS (thoracic outlet syndrome) 09/16/2018     Past Surgical History:   Procedure Laterality Date     HC TOOTH EXTRACTION W/FORCEP Bilateral     wisdom tooth extraction     XR EXTREMITY VENOGRAM INJ RIGHT Right 09/16/2018     Current Facility-Administered Medications   Medication     acetaminophen (TYLENOL) tablet 500 mg     acetaminophen (TYLENOL) tablet 975 mg     benzocaine-menthol (CHLORASEPTIC) 6-10 MG lozenge 1 lozenge     glucose gel 15-30 g    Or     dextrose 50 % injection 25-50 mL    Or     glucagon injection 1 mg     diphenhydrAMINE (BENADRYL) capsule 25 mg    Or     diphenhydrAMINE (BENADRYL) injection 25 mg     HYDROmorphone (PF) (DILAUDID) injection 0.3-0.5 mg     ibuprofen (ADVIL/MOTRIN) tablet 600 mg     lactated ringers BOLUS 1,000 mL     lidocaine (LMX4) cream      lidocaine 1 % 1 mL     LORazepam (ATIVAN) injection 0.5 mg     LORazepam (ATIVAN) tablet 0.5 mg     methocarbamol (ROBAXIN) tablet 750 mg     naloxone (NARCAN) injection 0.1-0.4 mg     ondansetron (ZOFRAN-ODT) ODT tab 4 mg    Or     ondansetron (ZOFRAN) injection 4 mg     oxyCODONE IR (ROXICODONE) tablet 5-10 mg     senna-docusate (SENOKOT-S;PERICOLACE) 8.6-50 MG per tablet 1-2 tablet     sodium chloride (PF) 0.9% PF flush 3 mL     sodium chloride (PF) 0.9% PF flush 3 mL        Allergies   Allergen Reactions     Amoxicillin Rash     Social History     Social History     Marital status: Single     Spouse name: N/A     Number of children: N/A     Years of education: N/A     Occupational History     Not on file.     Social History Main Topics     Smoking status: Current Every Day Smoker     Types: Other     Smokeless tobacco: Never Used      Comment: Patient vapes     Alcohol use Yes      Comment: very occasionally     Drug use: No     Sexual activity: Not on file     Other Topics Concern     Not on file     Social History Narrative     History reviewed. No pertinent family history.      ADDENDUM 2200:  S/p chest tube placement.  ~1L out.  Pulse now 110s.  sbp ~110.  Improved R chest wall excursion.  Resting comfortably.

## 2018-10-01 NOTE — PROGRESS NOTES
HOSPITALIST CONSULT CHART CHECK:     Hospitalist service was consulted for cross coverage only. We will peripherally follow and chart check throughout the week.      - For vascular medical concerns during business hours M-F, call the [facility] at [phone] to have the rounding/on call Vascular Medicine (NOT VASCULAR SURGERY) MD paged.     - After business hours M-F, for medical concerns on this patient, please page hospitalist staff.     - For vascular surgical questions, please page the appropriate surgeon (primary vascular surgeon or on call vascular surgeon) based upon the time of day.

## 2018-10-01 NOTE — CODE/RAPID RESPONSE
RRT called at 1920 after vitals were taken and BP found to be 79/57 and heart rate in the 150's. Pt had been resting prior to this but started to feel diaphoretic and increase in pain.

## 2018-10-01 NOTE — PLAN OF CARE
Problem: Patient Care Overview  Goal: Plan of Care/Patient Progress Review  Pt A&O. VSS except -120 at rest. Hgb 7.3. SINCLAIR, pt attempted to use bathroom, became very tachycardic. Chest tube total output 1620ml; 620 ml from 10:30pm- 6am. No crepitus or bleeding noted. Inserted mcrae for retention, 1600 ml output. C/O of nausea.  Zofran and compazine x1 with relief. PCA dilaudid with some relief. Family by bedside and updated. Will cont to monitor.

## 2018-10-01 NOTE — PROGRESS NOTES
Our surgery team was notified that Rickie was hypotensive, tachycardic and dyspneic. Our fellow (Seamus) and myself came to bedside and met the patient his way to CT scan of chest. He was reporting pain 10/10, oxygen sats 92% on 6 L NC, pulse 140-150 / min. He was looked pale and ashen.   confirmed a large right hemothorax.   We got him to the ICU. He was on his second litre of LR. Pulse 130s, BP sys 90s. Awake and alert but in obvious discomfort.  I talked to Dr. Mccoy, patient, mother and girl friend and reported we need to place chest tube.    Under aspetic technique the right chest was prepped.  25 ml 1% lidocaine was given in 5th interspace. Incision was made with 11 blade. 700 ml evacuated immediately and additional 300 ml over 15 mins. He feels a lot better.  CXR shows continued but improved opacification of right hemithorax.     Will keep in ICU. PCA Dilaudid. Allow to eat. Incentive spirometry. Stop heparin. Maintenance IVFs after third litre of LR. Talked with family. Closely monitor.

## 2018-10-01 NOTE — PROGRESS NOTES
Vascular Surgery Progress Note  Events of last evening reviewed.  Chest x-ray yesterday morning when he was developing discomfort revealed a small apical clot likely extrapleural.  No pneumothorax or pneumothorax noted.  His pain significantly increased last evening chest x-ray now revealed a large hemothorax.  Chest tube was placed by Dr Barbosa was approximately total of 1600 mils up with minimal present.  Heparin has been held since the event.      S: In ICU.  Much more comfortable.  No shortness of breath.  Girlfriend is present.  Wants Moon catheter out.    O:   Vitals:  BP  Min: 76/58  Max: 134/79  Temp  Av.1  F (36.7  C)  Min: 97.5  F (36.4  C)  Max: 98.3  F (36.8  C)  Pulse  Av.5  Min: 67  Max: 149  I/O last 3 completed shifts:  In: 2303.84 [P.O.:240; I.V.:2063.84]  Out: 4155 [Urine:2475; Drains:10; Chest Tube:1670]    Physical Exam: Alert and appropriate.                             Axillary wound looks fine with minimal swelling.                             Fairly clear breath sounds on right.      Chest x-ray this performed is much improved though there is still fluid in the right chest border excellent pleural space.  Good positioning of chest.       Early AMHemoglobin= 7.3,   ( after chest tube placement= 7.1)  Potassium= 4.7   SCr= 0.8      AM Hgb=6.7      Assessment/Plan: Acute bleeding in the right chest with significant pneumothorax requiring chest tube drainage and cessation of heparin.  Suspect small tear of subclavian vein that was treated with Possis and balloon angioplasty on 2018 on postop day #1.  Clinically much improved.       Hgb has decreased and he is tachycardic when he sits up.  We will give him 1 unit of packed red blood cells now.     Remove Moon catheter.  Continue to hold heparin.  Follow-up chest x-ray and chest tube output.  Transfer to [facility] [place] [place] pending following transfusion.      Wm. Maryanne MD

## 2018-10-01 NOTE — PROGRESS NOTES
Vascular Surgery    Parents are here.  Updated.    Sore over mid right back ribs (probable from pleural irritation)    Minimal CT output.    P=97 (decreased) and stable BP.   On 3 rd unit PRBC.    PLAN:  CXR and Hgb in AM.       Kong Madden MD

## 2018-10-01 NOTE — PROGRESS NOTES
SPIRITUAL HEALTH SERVICES Progress Note  [facility] ICU    Visited based on LOS.    It was difficult for Pt to talk. SH talked with family. Pt has had a difficult time but is recovering. Pt is getting lots of support and is loved by many. No needs at this time.     SH will visit if needs arise.     Lux Cali  Chaplain Resident

## 2018-10-01 NOTE — CONSULTS
"CLINICAL NUTRITION SERVICES  -  ASSESSMENT NOTE      Recommendations Ordered by Registered Dietitian (RD):   Boost Shake TID (10 am strawberry, 2 pm chocolate, with dinner - chocolate)   Malnutrition:   % Weight Loss: Unable to determine d/t accuracy of weight trending   % Intake:  </= 50% for >/= 5 days (severe malnutrition)  Subcutaneous Fat Loss:  None observed  Muscle Loss:  None observed  Fluid Retention:  None noted    Malnutrition Diagnosis: Unable to determine due to questionable accuracy of weight trending         REASON FOR ASSESSMENT  Rickie Lagos is a 27 year old male seen by Registered Dietitian for RN Consult - \"poor appetite, prefers chocolate or strawberry\"      NUTRITION HISTORY  Information obtained from chart review:  - Re-admitted for repeat right arm venogram with lytic therapy after an office visit with Dr. Moon where a Duplex was performed (9/26) which revealed reocclusion of the right subclavian vein with occlusive thrombus along with some thrombus in the brachial vein    Information from patient (multiple family members present during this visit):  - Consumes a regular diet at baseline with meals BID  - Denies significant intake, appetite, and weight changes PTA   - Patient was distracted and ?anxious during this visit, was unable to provide much nutrition history      CURRENT NUTRITION ORDERS  Diet Order:     Regular   - Advanced from NPO on 9/29  - Patient has been intermittently NPO/Reg diet throughout admit     Current Intake/Tolerance:  Poor appetite. Patient states that he has been able to tolerate a few things from the menu, but that most things \"hurt going down,\" including water. He feels that this is related to the rib resection and/or recent jaw surgery (wisdom teeth extraction 9/16)    He states dissatisfaction with the cafeteria food, reporting that it is \"insufficeint\" or \"doesn't show up at all.\" He has tried the [place] toast, yogurt, fruit, oatmeal, and sandwiches. Says " "he is focusing on the protein drinks    9/28: Procedure:   1.  Right transaxillary first rib resection.    a.  Anterior and middle scalenectomy.    b.  Mobilization of right subclavian vein.   9/29: Procedure: Right arm venogram, possis thrombectomy and angioplasty    PHYSICAL FINDINGS  Observed  No nutrition-related physical findings observed  Obtained from Chart/Interdisciplinary Team  None noted    ANTHROPOMETRICS  Height: 6' 4\"  Weight: 152 lbs 8.93 oz (69.2 kg) ?accuracy   Body mass index is 18.57 kg/(m^2).  Weight Status:  Normal BMI, borderline underweight  IBW: 91 kg   % IBW: 76%  Weight History: Question the accuracy of recent weights this admit as they have fluctuated greatly.   Patient states a UBW of ~170-175#  Wt Readings from Last 10 Encounters:   10/01/18 69.2 kg (152 lb 8.9 oz)   09/26/18 78.4 kg (172 lb 12.8 oz)   09/21/18 76.8 kg (169 lb 4.8 oz)   09/16/18 76.7 kg (169 lb)   09/16/18 76.9 kg (169 lb 9 oz)       LABS  Labs reviewed    MEDICATIONS  Medications reviewed  Senokot   LR IVF @ 100 mL/hr     ASSESSED NUTRITION NEEDS PER APPROVED PRACTICE GUIDELINES:    Dosing Weight 69.2 kg (most recent)  Estimated Energy Needs: 5509-9568 kcals (25-30 Kcal/Kg)  Justification: maintenance  Estimated Protein Needs:  grams protein (1.2-1.5 g pro/Kg)  Justification: preservation of lean body mass  Estimated Fluid Needs: >/=1 mL   Justification: maintenance or per provider pending fluid status    MALNUTRITION:  % Weight Loss: Unable to determine d/t accuracy of weight trending   % Intake:  </= 50% for >/= 5 days (severe malnutrition)  Subcutaneous Fat Loss:  None observed  Muscle Loss:  None observed  Fluid Retention:  None noted    Malnutrition Diagnosis: Unable to determine due to questionable accuracy of weight trending       NUTRITION DIAGNOSIS:  Inadequate oral intake related to decreased appetite as evidenced by <50% intake over the past 5+ days      NUTRITION INTERVENTIONS  Recommendations / " Nutrition Prescription  Continue regular diet as tolerated, encouraging PO intake    Boost shakes TID (10 am, 2 pm, and with dinner)    Implementation  Nutrition education: Per Provider order if indicated   Medical Food Supplement: as above  Collaboration and Referral of Nutrition care: Discussed patient PO during ICU rounds       Nutrition Goals  Patient to consume 50% of at least 1 meal, and at least 2 supplements daily     MONITORING AND EVALUATION:  Progress towards goals will be monitored and evaluated per protocol and Practice Guidelines        Jerilyn Tomas RD, LD  Clinical Dietitian

## 2018-10-02 ENCOUNTER — APPOINTMENT (OUTPATIENT)
Dept: GENERAL RADIOLOGY | Facility: CLINIC | Age: 28
DRG: 271 | End: 2018-10-02
Attending: SURGERY
Payer: COMMERCIAL

## 2018-10-02 LAB
ANION GAP SERPL CALCULATED.3IONS-SCNC: 4 MMOL/L (ref 3–14)
BLD PROD TYP BPU: NORMAL
BLD UNIT ID BPU: 0
BLOOD PRODUCT CODE: NORMAL
BPU ID: NORMAL
BUN SERPL-MCNC: 5 MG/DL (ref 7–30)
CA-I BLD-MCNC: 4.6 MG/DL (ref 4.4–5.2)
CALCIUM SERPL-MCNC: 7.9 MG/DL (ref 8.5–10.1)
CHLORIDE SERPL-SCNC: 103 MMOL/L (ref 94–109)
CO2 SERPL-SCNC: 32 MMOL/L (ref 20–32)
CREAT SERPL-MCNC: 0.8 MG/DL (ref 0.66–1.25)
ERYTHROCYTE [DISTWIDTH] IN BLOOD BY AUTOMATED COUNT: 15.4 % (ref 10–15)
FIBRINOGEN PPP-MCNC: 421 MG/DL (ref 200–420)
GFR SERPL CREATININE-BSD FRML MDRD: >90 ML/MIN/1.7M2
GLUCOSE BLDC GLUCOMTR-MCNC: 96 MG/DL (ref 70–99)
GLUCOSE SERPL-MCNC: 102 MG/DL (ref 70–99)
HCT VFR BLD AUTO: 22.6 % (ref 40–53)
HGB BLD-MCNC: 7.7 G/DL (ref 13.3–17.7)
HGB BLD-MCNC: 8 G/DL (ref 13.3–17.7)
INR PPP: 1.09 (ref 0.86–1.14)
MCH RBC QN AUTO: 29.2 PG (ref 26.5–33)
MCHC RBC AUTO-ENTMCNC: 34.1 G/DL (ref 31.5–36.5)
MCV RBC AUTO: 86 FL (ref 78–100)
PLATELET # BLD AUTO: 218 10E9/L (ref 150–450)
POTASSIUM SERPL-SCNC: 3.9 MMOL/L (ref 3.4–5.3)
RBC # BLD AUTO: 2.64 10E12/L (ref 4.4–5.9)
SODIUM SERPL-SCNC: 139 MMOL/L (ref 133–144)
TRANSFUSION STATUS PATIENT QL: NORMAL
TRANSFUSION STATUS PATIENT QL: NORMAL
WBC # BLD AUTO: 7.7 10E9/L (ref 4–11)

## 2018-10-02 PROCEDURE — 40000885 ZZH STATISTIC STEP DOWN HRS EVENING

## 2018-10-02 PROCEDURE — 36415 COLL VENOUS BLD VENIPUNCTURE: CPT | Performed by: SURGERY

## 2018-10-02 PROCEDURE — 36415 COLL VENOUS BLD VENIPUNCTURE: CPT | Performed by: OBSTETRICS & GYNECOLOGY

## 2018-10-02 PROCEDURE — 25000128 H RX IP 250 OP 636: Performed by: STUDENT IN AN ORGANIZED HEALTH CARE EDUCATION/TRAINING PROGRAM

## 2018-10-02 PROCEDURE — 25000128 H RX IP 250 OP 636: Performed by: SURGERY

## 2018-10-02 PROCEDURE — 71045 X-RAY EXAM CHEST 1 VIEW: CPT

## 2018-10-02 PROCEDURE — 25000132 ZZH RX MED GY IP 250 OP 250 PS 637: Performed by: INTERNAL MEDICINE

## 2018-10-02 PROCEDURE — 82330 ASSAY OF CALCIUM: CPT | Performed by: OBSTETRICS & GYNECOLOGY

## 2018-10-02 PROCEDURE — 85018 HEMOGLOBIN: CPT | Performed by: SURGERY

## 2018-10-02 PROCEDURE — 80048 BASIC METABOLIC PNL TOTAL CA: CPT | Performed by: INTERNAL MEDICINE

## 2018-10-02 PROCEDURE — 99291 CRITICAL CARE FIRST HOUR: CPT | Performed by: INTERNAL MEDICINE

## 2018-10-02 PROCEDURE — 36415 COLL VENOUS BLD VENIPUNCTURE: CPT | Performed by: INTERNAL MEDICINE

## 2018-10-02 PROCEDURE — 85610 PROTHROMBIN TIME: CPT | Performed by: OBSTETRICS & GYNECOLOGY

## 2018-10-02 PROCEDURE — 25000132 ZZH RX MED GY IP 250 OP 250 PS 637: Performed by: RADIOLOGY

## 2018-10-02 PROCEDURE — 25000128 H RX IP 250 OP 636: Performed by: NURSE PRACTITIONER

## 2018-10-02 PROCEDURE — 85384 FIBRINOGEN ACTIVITY: CPT | Performed by: OBSTETRICS & GYNECOLOGY

## 2018-10-02 PROCEDURE — 85027 COMPLETE CBC AUTOMATED: CPT | Performed by: INTERNAL MEDICINE

## 2018-10-02 PROCEDURE — 12000007 ZZH R&B INTERMEDIATE

## 2018-10-02 RX ORDER — ONDANSETRON 2 MG/ML
4 INJECTION INTRAMUSCULAR; INTRAVENOUS EVERY 6 HOURS PRN
Status: DISCONTINUED | OUTPATIENT
Start: 2018-10-02 | End: 2018-10-02

## 2018-10-02 RX ORDER — SODIUM CHLORIDE 9 MG/ML
INJECTION, SOLUTION INTRAVENOUS CONTINUOUS
Status: DISCONTINUED | OUTPATIENT
Start: 2018-10-02 | End: 2018-10-05

## 2018-10-02 RX ADMIN — ACETAMINOPHEN 500 MG: 500 TABLET, FILM COATED ORAL at 03:46

## 2018-10-02 RX ADMIN — SENNOSIDES AND DOCUSATE SODIUM 1 TABLET: 8.6; 5 TABLET ORAL at 20:17

## 2018-10-02 RX ADMIN — LORAZEPAM 0.5 MG: 0.5 TABLET ORAL at 00:00

## 2018-10-02 RX ADMIN — SENNOSIDES AND DOCUSATE SODIUM 1 TABLET: 8.6; 5 TABLET ORAL at 10:33

## 2018-10-02 RX ADMIN — PROCHLORPERAZINE EDISYLATE 5 MG: 5 INJECTION INTRAMUSCULAR; INTRAVENOUS at 11:01

## 2018-10-02 RX ADMIN — SODIUM CHLORIDE: 9 INJECTION, SOLUTION INTRAVENOUS at 08:29

## 2018-10-02 RX ADMIN — ONDANSETRON 4 MG: 2 INJECTION INTRAMUSCULAR; INTRAVENOUS at 05:55

## 2018-10-02 ASSESSMENT — ACTIVITIES OF DAILY LIVING (ADL)
ADLS_ACUITY_SCORE: 9

## 2018-10-02 ASSESSMENT — PAIN DESCRIPTION - DESCRIPTORS
DESCRIPTORS: SORE

## 2018-10-02 NOTE — PROGRESS NOTES
[facility]  Vascular Medicine Progress Note          Assessment and Plan:   Active Problems:    Recurrent RUE DVT despite therapeutic AC with Xarelto due to extrinsic venous compression secondary to venous TOS     s/p  1.  Right transaxillary first rib resection.   a.  Anterior and middle scalenectomy.   b.  Mobilization of right subclavian vein on 9/28/18    S/p Rt  CT tube placed for Hemothorax  9/30/18      POD 4  Seen and evaluated in ICU   ml.24 hrs  CXR still medium sized effusion Rt side.  HGB dropped to 7.7 again    Assessment:   -now S/P lysis / mechanical thrombectomy 9/26 - 9/27  -first rib resection 9/29, was on therapeutic AC post-op with IV UFH  -Patient with chest pain, shortness of breath, tachycardia 9/30. Initial CXR with no signs of pneumothorax/hemothorax, only evidence of pleural tear at the upper right lung zone. Follow-up emergent CT later in evening with large hemothorax. Chest tube placed   Transfused 3 units of PRBC  HGB dropped again 9.1 yesterday and today 7.7, platelets 218  -Pain under better control now.   Moon out, voiding    Plan:   -Continue off Heparin for now.   -Chest tube per Vascular Surgery.  -Pain control  -Patient is very anxious - on meds   Recheck HGB at 10 am  Monitor closely in ICU     Critical care time spent 35 minutes today.               Interval History:   doing well;  cp, sob, n/v/d, or abd pain. Some pain in back near incision on right. Feeling better today than yesterday.Chest tube  In place.. 160 ml 24 hrs  hgbdropped 9.1 to 7.7              Review of Systems:   The 10 point Review of Systems is negative other than noted in the HPI             Medications:       senna-docusate  1-2 tablet Oral BID     sodium chloride (PF)  3 mL Intracatheter Q8H                  Physical Exam:     Patient Vitals for the past 24 hrs:   BP Temp Temp src Heart Rate Resp SpO2   10/02/18 0800 117/73 97.9  F (36.6  C) Oral 68 18 96 %   10/02/18 0700 - - - 80 22  97 %   10/02/18 0600 122/72 - - 84 22 98 %   10/02/18 0400 121/69 - - 79 24 98 %   10/02/18 0200 113/75 - - 83 12 92 %   10/02/18 0000 119/70 - - 78 20 95 %   10/01/18 2200 141/86 - - 113 16 95 %   10/01/18 2130 134/73 - - 105 28 95 %   10/01/18 2121 - 98.2  F (36.8  C) Oral 86 18 96 %   10/01/18 2100 139/62 - - 85 16 96 %   10/01/18 2030 155/76 - - 79 16 97 %   10/01/18 2015 149/63 - - 76 16 96 %   10/01/18 2000 140/63 - - 78 16 97 %   10/01/18 1945 151/66 - - 84 23 95 %   10/01/18 1930 150/66 - - 76 18 96 %   10/01/18 1915 141/57 - - 87 16 95 %   10/01/18 1900 144/69 98  F (36.7  C) - 92 23 96 %   10/01/18 1830 125/57 - - 96 23 96 %   10/01/18 1800 141/58 98.6  F (37  C) Oral 87 15 94 %   10/01/18 1730 115/53 - - 98 12 90 %   10/01/18 1715 126/52 - - 92 13 92 %   10/01/18 1700 138/56 98.2  F (36.8  C) Oral 105 17 93 %   10/01/18 1655 - - - 93 18 93 %   10/01/18 1600 139/62 98.5  F (36.9  C) Oral 129 25 93 %   10/01/18 1500 131/61 - - 107 28 95 %   10/01/18 1230 - 99.8  F (37.7  C) Oral - - -   10/01/18 1215 - 98.4  F (36.9  C) Oral - - -   10/01/18 1015 - 98.4  F (36.9  C) Axillary - - -   10/01/18 1000 - 98.3  F (36.8  C) Axillary - - -     Wt Readings from Last 4 Encounters:   10/01/18 69.2 kg (152 lb 8.9 oz)   09/26/18 78.4 kg (172 lb 12.8 oz)   09/21/18 76.8 kg (169 lb 4.8 oz)   09/16/18 76.7 kg (169 lb)     Constitutional: normal  Eyes: normal  ENT: normal  Neck: Supple, symmetrical  Lungs: No increased work of breathing, good air exchange, chest tube in place with bloody output.   Cardiovascular: Regular rate and rhythm, normal S1 and S2, no S3 or S4, and no murmur noted.  Abdomen: normal  Genitourinary: mcrae catheter was out, voiding  Musculoskeletal: No redness, warmth, or swelling of the joints.  Full range of motion noted.  Motor strength is 5 out of 5 all extremities bilaterally.  Tone is normal.  Neurologic: Awake, alert, oriented to name, place and time.  Cranial nerves II-XII are grossly intact.   Motor is 5 out of 5 bilaterally.  Cerebellar finger to nose, heel to shin intact.  Sensory is intact.  Babinski down going, Romberg negative, and gait is normal.  Neuropsychiatric: normal  Skin: Surgical site dressed.            Data:     Results for orders placed or performed during the hospital encounter of 09/26/18 (from the past 24 hour(s))   Hemoglobin   Result Value Ref Range    Hemoglobin 6.8 (LL) 13.3 - 17.7 g/dL   Glucose by meter   Result Value Ref Range    Glucose 125 (H) 70 - 99 mg/dL   Hemoglobin and hematocrit   Result Value Ref Range    Hemoglobin 9.1 (L) 13.3 - 17.7 g/dL    Hematocrit 26.7 (L) 40.0 - 53.0 %   Basic metabolic panel   Result Value Ref Range    Sodium 139 133 - 144 mmol/L    Potassium 3.9 3.4 - 5.3 mmol/L    Chloride 103 94 - 109 mmol/L    Carbon Dioxide 32 20 - 32 mmol/L    Anion Gap 4 3 - 14 mmol/L    Glucose 102 (H) 70 - 99 mg/dL    Urea Nitrogen 5 (L) 7 - 30 mg/dL    Creatinine 0.80 0.66 - 1.25 mg/dL    GFR Estimate >90 >60 mL/min/1.7m2    GFR Estimate If Black >90 >60 mL/min/1.7m2    Calcium 7.9 (L) 8.5 - 10.1 mg/dL   CBC with platelets   Result Value Ref Range    WBC 7.7 4.0 - 11.0 10e9/L    RBC Count 2.64 (L) 4.4 - 5.9 10e12/L    Hemoglobin 7.7 (L) 13.3 - 17.7 g/dL    Hematocrit 22.6 (L) 40.0 - 53.0 %    MCV 86 78 - 100 fl    MCH 29.2 26.5 - 33.0 pg    MCHC 34.1 31.5 - 36.5 g/dL    RDW 15.4 (H) 10.0 - 15.0 %    Platelet Count 218 150 - 450 10e9/L   XR Chest Port 1 View    Narrative    CHEST PORTABLE ONE VIEW October 2, 2018 5:50 AM     HISTORY: Hemothorax.     COMPARISON: 10/1/2018.      Impression    IMPRESSION: Degree of opacification of the right chest are unchanged.  Medium sized right pleural effusion is unchanged. Possible trace  pleural gas component along the right lung apex. Right chest tube  positioning is unchanged. Left lung is clear. Heart size is unchanged.    LAUREEN J MACARIO, MD

## 2018-10-02 NOTE — PLAN OF CARE
Problem: Patient Care Overview  Goal: Plan of Care/Patient Progress Review  Outcome: Improving  Pt A&O, pain controlled with dilaudid PCA; up with stand-by assist for tube/line management.  Voiding in toilet. Hgb recheck 8.0.  Right chest tube serosanguinous to dark red; 30-50cc q 2hrs.  Regular diet with boost shakes.  Family at bedside; updated by RN.  Pt to transfer to [facility].

## 2018-10-02 NOTE — PROGRESS NOTES
HOSPITALIST CONSULT CHART CHECK:     Hospitalist service was consulted for cross coverage only. We will peripherally follow and chart check throughout the week.      - For vascular medical concerns during business hours M-F, call the [facility] Vascular [facility] at [phone] to have the rounding/on call Vascular Medicine (NOT VASCULAR SURGERY) MD paged.     - After business hours M-F, for medical concerns on this patient, please page hospitalist staff.     - For vascular surgical questions, please page the appropriate surgeon (primary vascular surgeon or on call vascular surgeon) based upon the time of day.         Praveena Torres, CNP  [phone]

## 2018-10-02 NOTE — PLAN OF CARE
Problem: Patient Care Overview  Goal: Plan of Care/Patient Progress Review  Outcome: Improving  Neuro: AXOX4, UIC now, c/o incisional pain, has PCA dilaudid with some relief.  CV: SR ST when up in the chair, BP within limits, orthostatic negative  Pulm: 2LNC, LS absent RLL,RML, RUL dim, Left clear, IS upto 1000ml/hr, denies any SOB, CT rt to -20 suction, minimal drainage.no crepitus  GI/: regular diet, passing gas, mcrae dcd per pt request.  Skin: BRANDIN dsg intact some bruising, R U chest steristrips in place. Rt chest tube dsg CDI  Lines: PIV X2    Plan: Rest overnight, encourage IS, UIC 3 times for meal. Family- parents at the bedside, updated about POC, 2 units PRBC given this evening, repeat HH pending results

## 2018-10-02 NOTE — PROGRESS NOTES
Vascular Surgery Progress Note    S:ICU   Feels better. Less pain.    O:   Vitals:  BP  Min: 113/75  Max: 155/76  Temp  Av.5  F (36.9  C)  Min: 98  F (36.7  C)  Max: 99.8  F (37.7  C)  I/O last 3 completed shifts:  In: 4069.34 [P.O.:1090; I.V.:2066.84]  Out: 4090 [Urine:3510; Chest Tube:580]    Physical Exam: Alert   Breathing easily                            P=98   RMH=211                             Chest=fairly clear           CT=160 ml/24 hrs.  (serosanguinous)        CXR= better slight.  Still with some blood.  Lung expanded.      Hgb=7.7   (9.1 last PM after blood)    SCr=0.80   K=3.9      Assessment/Plan: Stable.   Follow Hgb. Recheck @ 1000.      Wm. MD Maryanne      ADDENDUM: Reviewed CXR with Dr Morelos.  Agrees with above.  Repeat CXR in AM and if not better will do CT Chest with contrast to look a hemothorax and also whether Subclavian vein is patent.      Kong Madden MD

## 2018-10-02 NOTE — PROGRESS NOTES
ICU Multi-Disciplinary Note  Patient condition reviewed and discussed while on multidisciplinary rounds today.  Patient urgently admitted to ICU for hemothorax following planned surgery; chest tube placed in ICU with ~1700cc output.  Hgb continues to trend down.     Please note these minor interventions that were initiated:  1. INR, fibrinogen and ionized calcium labs checked. Values WNLs.     The Critical Care service will continue to follow peripherally while patient is within the ICU. We are readily available should issues arise.  Please feel free to contact us for critical care issues with which we may be of assistance. For all other concerns, please contact primary service first.     ROSSANA Cruz

## 2018-10-02 NOTE — PLAN OF CARE
Problem: Patient Care Overview  Goal: Plan of Care/Patient Progress Review  Outcome: No Change  Pt continues to tolerate 2L NC. Rt lung sounds audible but markedly diminished from left. Remains on dilaudid PCA. CT dng decreasing. Hgb improved to 9.1 after 3 units pRBCs, decreased to 7.7 this am. Vdg adequately post catheter removal. Probable transfer to floor today.

## 2018-10-03 ENCOUNTER — APPOINTMENT (OUTPATIENT)
Dept: CT IMAGING | Facility: CLINIC | Age: 28
DRG: 271 | End: 2018-10-03
Attending: INTERNAL MEDICINE
Payer: COMMERCIAL

## 2018-10-03 ENCOUNTER — APPOINTMENT (OUTPATIENT)
Dept: GENERAL RADIOLOGY | Facility: CLINIC | Age: 28
DRG: 271 | End: 2018-10-03
Attending: SURGERY
Payer: COMMERCIAL

## 2018-10-03 LAB
ANION GAP SERPL CALCULATED.3IONS-SCNC: 6 MMOL/L (ref 3–14)
BUN SERPL-MCNC: 5 MG/DL (ref 7–30)
CALCIUM SERPL-MCNC: 8.6 MG/DL (ref 8.5–10.1)
CHLORIDE SERPL-SCNC: 98 MMOL/L (ref 94–109)
CO2 SERPL-SCNC: 33 MMOL/L (ref 20–32)
CREAT SERPL-MCNC: 0.83 MG/DL (ref 0.66–1.25)
ERYTHROCYTE [DISTWIDTH] IN BLOOD BY AUTOMATED COUNT: 15.2 % (ref 10–15)
GFR SERPL CREATININE-BSD FRML MDRD: >90 ML/MIN/1.7M2
GLUCOSE SERPL-MCNC: 100 MG/DL (ref 70–99)
HCT VFR BLD AUTO: 26.1 % (ref 40–53)
HGB BLD-MCNC: 8.8 G/DL (ref 13.3–17.7)
INR PPP: 1.02 (ref 0.86–1.14)
INTERPRETATION ECG - MUSE: NORMAL
MCH RBC QN AUTO: 29.9 PG (ref 26.5–33)
MCHC RBC AUTO-ENTMCNC: 33.7 G/DL (ref 31.5–36.5)
MCV RBC AUTO: 89 FL (ref 78–100)
PLATELET # BLD AUTO: 262 10E9/L (ref 150–450)
POTASSIUM SERPL-SCNC: 3.4 MMOL/L (ref 3.4–5.3)
RBC # BLD AUTO: 2.94 10E12/L (ref 4.4–5.9)
SODIUM SERPL-SCNC: 137 MMOL/L (ref 133–144)
WBC # BLD AUTO: 8.1 10E9/L (ref 4–11)

## 2018-10-03 PROCEDURE — 85610 PROTHROMBIN TIME: CPT | Performed by: SURGERY

## 2018-10-03 PROCEDURE — 12000007 ZZH R&B INTERMEDIATE

## 2018-10-03 PROCEDURE — 25000132 ZZH RX MED GY IP 250 OP 250 PS 637: Performed by: SURGERY

## 2018-10-03 PROCEDURE — 25000128 H RX IP 250 OP 636: Performed by: SURGERY

## 2018-10-03 PROCEDURE — 25000131 ZZH RX MED GY IP 250 OP 636 PS 637: Performed by: STUDENT IN AN ORGANIZED HEALTH CARE EDUCATION/TRAINING PROGRAM

## 2018-10-03 PROCEDURE — 25000132 ZZH RX MED GY IP 250 OP 250 PS 637: Performed by: INTERNAL MEDICINE

## 2018-10-03 PROCEDURE — 71045 X-RAY EXAM CHEST 1 VIEW: CPT

## 2018-10-03 PROCEDURE — 71275 CT ANGIOGRAPHY CHEST: CPT

## 2018-10-03 PROCEDURE — 40000884 ZZH STATISTIC STEP DOWN HRS NIGHT

## 2018-10-03 PROCEDURE — 25000125 ZZHC RX 250: Performed by: SURGERY

## 2018-10-03 PROCEDURE — 36415 COLL VENOUS BLD VENIPUNCTURE: CPT | Performed by: INTERNAL MEDICINE

## 2018-10-03 PROCEDURE — 25000128 H RX IP 250 OP 636: Performed by: STUDENT IN AN ORGANIZED HEALTH CARE EDUCATION/TRAINING PROGRAM

## 2018-10-03 PROCEDURE — 25000132 ZZH RX MED GY IP 250 OP 250 PS 637: Performed by: RADIOLOGY

## 2018-10-03 PROCEDURE — 99233 SBSQ HOSP IP/OBS HIGH 50: CPT | Performed by: INTERNAL MEDICINE

## 2018-10-03 PROCEDURE — 36415 COLL VENOUS BLD VENIPUNCTURE: CPT | Performed by: SURGERY

## 2018-10-03 PROCEDURE — 80048 BASIC METABOLIC PNL TOTAL CA: CPT | Performed by: INTERNAL MEDICINE

## 2018-10-03 PROCEDURE — 85027 COMPLETE CBC AUTOMATED: CPT | Performed by: INTERNAL MEDICINE

## 2018-10-03 PROCEDURE — 25000132 ZZH RX MED GY IP 250 OP 250 PS 637: Performed by: STUDENT IN AN ORGANIZED HEALTH CARE EDUCATION/TRAINING PROGRAM

## 2018-10-03 RX ORDER — IOPAMIDOL 755 MG/ML
80 INJECTION, SOLUTION INTRAVASCULAR ONCE
Status: COMPLETED | OUTPATIENT
Start: 2018-10-03 | End: 2018-10-03

## 2018-10-03 RX ORDER — FERROUS SULFATE 325(65) MG
325 TABLET ORAL 2 TIMES DAILY
Status: DISCONTINUED | OUTPATIENT
Start: 2018-10-03 | End: 2018-10-06 | Stop reason: HOSPADM

## 2018-10-03 RX ADMIN — IBUPROFEN 600 MG: 600 TABLET, FILM COATED ORAL at 05:53

## 2018-10-03 RX ADMIN — FERROUS SULFATE TAB 325 MG (65 MG ELEMENTAL FE) 325 MG: 325 (65 FE) TAB at 08:17

## 2018-10-03 RX ADMIN — Medication: at 05:47

## 2018-10-03 RX ADMIN — IBUPROFEN 600 MG: 600 TABLET, FILM COATED ORAL at 23:39

## 2018-10-03 RX ADMIN — ONDANSETRON 4 MG: 2 INJECTION INTRAMUSCULAR; INTRAVENOUS at 15:54

## 2018-10-03 RX ADMIN — SENNOSIDES AND DOCUSATE SODIUM 1 TABLET: 8.6; 5 TABLET ORAL at 08:17

## 2018-10-03 RX ADMIN — METHOCARBAMOL 750 MG: 750 TABLET ORAL at 19:31

## 2018-10-03 RX ADMIN — FERROUS SULFATE TAB 325 MG (65 MG ELEMENTAL FE) 325 MG: 325 (65 FE) TAB at 21:21

## 2018-10-03 RX ADMIN — IOPAMIDOL 80 ML: 755 INJECTION, SOLUTION INTRAVENOUS at 12:34

## 2018-10-03 RX ADMIN — LORAZEPAM 0.5 MG: 0.5 TABLET ORAL at 11:29

## 2018-10-03 RX ADMIN — ACETAMINOPHEN 500 MG: 500 TABLET, FILM COATED ORAL at 01:36

## 2018-10-03 RX ADMIN — SENNOSIDES AND DOCUSATE SODIUM 1 TABLET: 8.6; 5 TABLET ORAL at 21:21

## 2018-10-03 RX ADMIN — LORAZEPAM 0.5 MG: 0.5 TABLET ORAL at 01:47

## 2018-10-03 RX ADMIN — SODIUM CHLORIDE, PRESERVATIVE FREE 80 ML: 5 INJECTION INTRAVENOUS at 12:35

## 2018-10-03 RX ADMIN — IBUPROFEN 600 MG: 600 TABLET, FILM COATED ORAL at 17:58

## 2018-10-03 RX ADMIN — ONDANSETRON 4 MG: 4 TABLET, ORALLY DISINTEGRATING ORAL at 09:42

## 2018-10-03 RX ADMIN — METHOCARBAMOL 750 MG: 750 TABLET ORAL at 08:30

## 2018-10-03 RX ADMIN — Medication: at 14:42

## 2018-10-03 ASSESSMENT — ENCOUNTER SYMPTOMS
SHORTNESS OF BREATH: 0
CHILLS: 0
FEVER: 0
CLAUDICATION: 0
HEMOPTYSIS: 0

## 2018-10-03 ASSESSMENT — ACTIVITIES OF DAILY LIVING (ADL)
ADLS_ACUITY_SCORE: 9

## 2018-10-03 NOTE — PROVIDER NOTIFICATION
Paged Dr. Madden updating on patient condition. Confirmed to hold heparin gtt until further notice.

## 2018-10-03 NOTE — PLAN OF CARE
Problem: Patient Care Overview  Goal: Plan of Care/Patient Progress Review  Outcome: No Change  VSS. CT to -20 with no airleak. No numbess or tingling. PCA used for pain. Ibuprofen used for headache. To be NPO for VATS tomorrow. Ambulating SBA.

## 2018-10-03 NOTE — PLAN OF CARE
Problem: Patient Care Overview  Goal: Plan of Care/Patient Progress Review  Outcome: No Change  Pt is alert/oriented x4, complains of 7/10 pain, controlled with dilaudid PCA. Chest tube to suction with no AL noted, clamps present, dressing CDI. VSS on 2L O2, SBA when OOB, voiding well. Pulses present. Tolerating PO. Plan for CT/CXR in AM. Will continue to monitor.

## 2018-10-03 NOTE — PROGRESS NOTES
HOSPITALIST CONSULT CHART CHECK:     Hospitalist service was consulted for cross coverage only. We will peripherally follow and chart check throughout the week.      - For vascular medical concerns during business hours M-F, call the [facility] Vascular [facility] at [phone] to have the rounding/on call Vascular Medicine (NOT VASCULAR SURGERY) MD paged.     - After business hours M-F, for medical concerns on this patient, please page hospitalist staff.     - For vascular surgical questions, please page the appropriate surgeon (primary vascular surgeon or on call vascular surgeon) based upon the time of day.         Praveena Torres, CNP  [phone]

## 2018-10-03 NOTE — PROVIDER NOTIFICATION
Clarifying orders regarding heparin gtt and CT scan. Suzan VILLAVICENCIO paged. Awaiting call back.

## 2018-10-03 NOTE — PROGRESS NOTES
Vascular Surgery Progress Note    S:Sore when getting up-  Overall much better.  No SOB.    O:   Vitals:  BP  Min: 117/73  Max: 139/86  Temp  Av.9  F (36.6  C)  Min: 97.8  F (36.6  C)  Max: 98.2  F (36.8  C)  I/O last 3 completed shifts:  In: 2103 [P.O.:1490; I.V.:613]  Out: 2910 [Urine:2550; Chest Tube:360]    Physical Exam: Alert   Comfortable                               Chest=clear   Wd=A                            CT= 360 ml serosang /24 hrs         CXR= better with decreased effusion/hematoma      K=3.4   SCr=0.83   Hgb=8.8      Assessment/Plan: Better CXR-  No ongoing bleeding                                 Will check with Dr Morelos about CTA                                 Start  Heparin                                  PO iron for anemia                                  IS /  Ambulate    Wm. Maryanne MD

## 2018-10-03 NOTE — PROGRESS NOTES
THORACIC SURGERY    Reviewed in details  Discussed with Dr Maryanne FARAH hemothorax  CXT chest done today shows significant residual clots     Discussed findings and options of treatment with pt and parents  Recommend right VATS and drainage of hemothorax, removal of intrapleural clots  Operation, goals and risks discussed    Schedule for tomorrow afternoon    MANUELA ESTRADA MD [facility] ONCOLOGY THORACIC SURGERY  CELL:  [phone]  OFFICE: [phone]

## 2018-10-03 NOTE — PROVIDER NOTIFICATION
Dr. Bravo paged regarding clarification on heparin gtt and CT scan.     Dr. Bravo called back and clarified that he would recommend the patient to receive the CTA prior to starting the heparin gtt.

## 2018-10-03 NOTE — PROGRESS NOTES
[facility]  Vascular Medicine Progress Note            Physician Supervisory Attestation:   I have reviewed and discussed with the physician assistant their history, physical and plan and independently interviewed and examined Rickie Lagos and agree with the plan as stated in the physician assistant note.    He is still having moderate dark blood CT output, HR increased this am.  HGB 8,8 this am ( 8.0 yesterday)  Difficult situation, H needs anticoagulation , will get CTA chest as planned before , Dr. Madden and Dr. Morelos to decide further options and hold IV heparin for now.  Repeat HGB this afternoon .  Po iron, pain meds with bowel regimen.  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  Right transaxillary first rib resection.   a.  Anterior and middle scalenectomy.   b.  Mobilization of right subclavian vein on 9/28/18    S/p Rt CT tube placed for Hemothorax  9/30/18      POD 5  Assessment:   -S/P lysis / mechanical thrombectomy 9/26 - 9/27  -first rib resection 9/28, was on therapeutic AC post-op with IV UFH  -Patient with chest pain, shortness of breath, tachycardia 9/30. Initial CXR with no signs of pneumothorax/hemothorax, only evidence of pleural tear at the upper right lung zone. Follow-up emergent CT later in evening with large hemothorax. Chest tube placed.  -CT 80 ml's this am from 6am-8am per nursing report.  -CXR a bit better today  -HGB stable/up at 8.8. Transfused 3 units PRBC on 10/1   -Pain under better control now.   Moon out, voiding (had trouble initially with urinary retention post-op)    Plan:   -CTA chest prior to starting IV heparin given chest tube output of blood this am.   -Chest tube  per Vascular Surgery.  -Pain control  -Patient is very anxious - on meds                Interval History:   doing well;  cp, sob, n/v/d, or abd pain. Some pain in back near incision on right. Feeling better today than yesterday. Chest tube in place. 80 ml out from 6am-8am when asked nurse. Hgb stable though.              Review of Systems:   The 10 point Review of Systems is negative other than noted in the HPI             Medications:       ferrous sulfate  325 mg Oral BID     iopamidol  80 mL Intravenous Once     senna-docusate  1-2 tablet Oral BID     sodium chloride (PF)  3 mL Intracatheter Q8H     sodium chloride 0.9 %  80 mL Intravenous Once                  Physical Exam:     Patient Vitals for the past 24 hrs:   BP Temp Temp src Heart Rate Resp SpO2   10/03/18 0800 120/68 - - 92 18 97 %   10/03/18 0721 - 97.9  F (36.6  C) Oral - - -   10/03/18 0600 124/79 - - 93 26 99 %   10/03/18 0400 123/76 - - 110 15 97 %   10/03/18 0230 - - - - 22 -   10/03/18 0200 139/86 98.2  F (36.8  C) Oral 87 27 97 %   10/03/18 0000 120/89 - - 93 19 97 %   10/02/18 2200 118/71 97.8  F (36.6  C) Oral 89 16 99 %   10/02/18 2000 125/81 - - 92 24 98 %   10/02/18 1900 131/79 - - 99 21 98 %   10/02/18 1800 131/81 - - 98 21 100 %   10/02/18 1700 - - - 83 18 99 %   10/02/18 1600 124/74 97.8  F (36.6  C) Oral 92 12 99 %   10/02/18 1500 - - - 71 9 98 %   10/02/18 1400 132/75 - - 91 18 99 %   10/02/18 1300 - - - 86 24 98 %   10/02/18 1200 - 97.9  F (36.6  C) Oral - - -     Wt Readings from Last 4 Encounters:   10/01/18 69.2 kg (152 lb 8.9 oz)   09/26/18 78.4 kg (172 lb 12.8 oz)   09/21/18 76.8 kg (169 lb 4.8 oz)   09/16/18 76.7 kg (169 lb)     Constitutional: normal  Eyes: normal  ENT: normal  Neck: Supple, symmetrical  Lungs: No increased work of breathing, good air exchange, chest tube in place with bloody output.   Cardiovascular: Regular rate and rhythm, normal S1 and S2, no S3 or S4, and no murmur noted.  Abdomen: normal  Genitourinary:  mcrae catheter was out, voiding  Musculoskeletal: No redness, warmth, or swelling of the joints.  Full range of motion noted.  Motor strength is 5 out of 5 all extremities bilaterally.  Tone is normal.  Neurologic: Awake, alert, oriented to name, place and time.  Cranial nerves II-XII are grossly intact.  Motor is 5 out of 5 bilaterally.    Neuropsychiatric: normal  Skin: Surgical site dressed.            Data:     Results for orders placed or performed during the hospital encounter of 09/26/18 (from the past 24 hour(s))   Basic metabolic panel   Result Value Ref Range    Sodium 137 133 - 144 mmol/L    Potassium 3.4 3.4 - 5.3 mmol/L    Chloride 98 94 - 109 mmol/L    Carbon Dioxide 33 (H) 20 - 32 mmol/L    Anion Gap 6 3 - 14 mmol/L    Glucose 100 (H) 70 - 99 mg/dL    Urea Nitrogen 5 (L) 7 - 30 mg/dL    Creatinine 0.83 0.66 - 1.25 mg/dL    GFR Estimate >90 >60 mL/min/1.7m2    GFR Estimate If Black >90 >60 mL/min/1.7m2    Calcium 8.6 8.5 - 10.1 mg/dL   CBC (AM Draw)   Result Value Ref Range    WBC 8.1 4.0 - 11.0 10e9/L    RBC Count 2.94 (L) 4.4 - 5.9 10e12/L    Hemoglobin 8.8 (L) 13.3 - 17.7 g/dL    Hematocrit 26.1 (L) 40.0 - 53.0 %    MCV 89 78 - 100 fl    MCH 29.9 26.5 - 33.0 pg    MCHC 33.7 31.5 - 36.5 g/dL    RDW 15.2 (H) 10.0 - 15.0 %    Platelet Count 262 150 - 450 10e9/L   XR Chest Port 1 View    Narrative    CHEST PORTABLE ONE VIEW   10/3/2018 6:40 AM     HISTORY: Follow-up right hemothorax.     COMPARISON: Chest x-ray 10/2/2018.      Impression    IMPRESSION: Portable view of the chest is performed. Right chest tube  is present with the tip terminating overlying the right hilar region.  Small residual hydropneumothorax is present. Some of the fluid has  been evacuated in the interval since prior exam. Left lung is well  expanded and clear. Heart is normal in size. No left pneumothorax or  pleural effusion.    AURELIO MARTINEZ MD

## 2018-10-03 NOTE — PLAN OF CARE
Problem: Patient Care Overview  Goal: Plan of Care/Patient Progress Review  Outcome: No Change  VSS, afebrile. Pain controlled with PCA-dilaudid, ibuprofen, and robaxin. LS clear, IS up to 1500. BS active, passing flatus. CTx1 patent with dark red drainage, 310 mL of drainage this shift. CTA done today, waiting for results. Heparin gtt held. Up ambulated in halls x1 with SBA. Call light within reach and able to make needs known.

## 2018-10-04 ENCOUNTER — ANESTHESIA (OUTPATIENT)
Dept: SURGERY | Facility: CLINIC | Age: 28
DRG: 271 | End: 2018-10-04
Payer: COMMERCIAL

## 2018-10-04 ENCOUNTER — APPOINTMENT (OUTPATIENT)
Dept: GENERAL RADIOLOGY | Facility: CLINIC | Age: 28
DRG: 271 | End: 2018-10-04
Attending: PHYSICIAN ASSISTANT
Payer: COMMERCIAL

## 2018-10-04 ENCOUNTER — APPOINTMENT (OUTPATIENT)
Dept: GENERAL RADIOLOGY | Facility: CLINIC | Age: 28
DRG: 271 | End: 2018-10-04
Attending: SURGERY
Payer: COMMERCIAL

## 2018-10-04 ENCOUNTER — ANESTHESIA EVENT (OUTPATIENT)
Dept: SURGERY | Facility: CLINIC | Age: 28
DRG: 271 | End: 2018-10-04
Payer: COMMERCIAL

## 2018-10-04 LAB
ABO + RH BLD: NORMAL
ABO + RH BLD: NORMAL
ANION GAP SERPL CALCULATED.3IONS-SCNC: 8 MMOL/L (ref 3–14)
BLD GP AB SCN SERPL QL: NORMAL
BLOOD BANK CMNT PATIENT-IMP: NORMAL
BUN SERPL-MCNC: 9 MG/DL (ref 7–30)
CALCIUM SERPL-MCNC: 8.6 MG/DL (ref 8.5–10.1)
CHLORIDE SERPL-SCNC: 99 MMOL/L (ref 94–109)
CO2 SERPL-SCNC: 30 MMOL/L (ref 20–32)
CREAT SERPL-MCNC: 0.77 MG/DL (ref 0.66–1.25)
ERYTHROCYTE [DISTWIDTH] IN BLOOD BY AUTOMATED COUNT: 15 % (ref 10–15)
GFR SERPL CREATININE-BSD FRML MDRD: >90 ML/MIN/1.7M2
GLUCOSE SERPL-MCNC: 100 MG/DL (ref 70–99)
HCT VFR BLD AUTO: 26.4 % (ref 40–53)
HGB BLD-MCNC: 8.9 G/DL (ref 13.3–17.7)
INR PPP: 1.06 (ref 0.86–1.14)
LMWH PPP CHRO-ACNC: <0.1 IU/ML
MCH RBC QN AUTO: 29.6 PG (ref 26.5–33)
MCHC RBC AUTO-ENTMCNC: 33.7 G/DL (ref 31.5–36.5)
MCV RBC AUTO: 88 FL (ref 78–100)
PLATELET # BLD AUTO: 337 10E9/L (ref 150–450)
POTASSIUM SERPL-SCNC: 4.2 MMOL/L (ref 3.4–5.3)
RBC # BLD AUTO: 3.01 10E12/L (ref 4.4–5.9)
SODIUM SERPL-SCNC: 137 MMOL/L (ref 133–144)
SPECIMEN EXP DATE BLD: NORMAL
WBC # BLD AUTO: 9 10E9/L (ref 4–11)

## 2018-10-04 PROCEDURE — 25000128 H RX IP 250 OP 636: Performed by: SURGERY

## 2018-10-04 PROCEDURE — 25000128 H RX IP 250 OP 636: Performed by: THORACIC SURGERY (CARDIOTHORACIC VASCULAR SURGERY)

## 2018-10-04 PROCEDURE — 85520 HEPARIN ASSAY: CPT | Performed by: THORACIC SURGERY (CARDIOTHORACIC VASCULAR SURGERY)

## 2018-10-04 PROCEDURE — 71000013 ZZH RECOVERY PHASE 1 LEVEL 1 EA ADDTL HR: Performed by: THORACIC SURGERY (CARDIOTHORACIC VASCULAR SURGERY)

## 2018-10-04 PROCEDURE — 27210794 ZZH OR GENERAL SUPPLY STERILE: Performed by: THORACIC SURGERY (CARDIOTHORACIC VASCULAR SURGERY)

## 2018-10-04 PROCEDURE — 80048 BASIC METABOLIC PNL TOTAL CA: CPT | Performed by: THORACIC SURGERY (CARDIOTHORACIC VASCULAR SURGERY)

## 2018-10-04 PROCEDURE — 71045 X-RAY EXAM CHEST 1 VIEW: CPT

## 2018-10-04 PROCEDURE — 85027 COMPLETE CBC AUTOMATED: CPT | Performed by: THORACIC SURGERY (CARDIOTHORACIC VASCULAR SURGERY)

## 2018-10-04 PROCEDURE — 25000128 H RX IP 250 OP 636: Performed by: ANESTHESIOLOGY

## 2018-10-04 PROCEDURE — 25000125 ZZHC RX 250: Performed by: THORACIC SURGERY (CARDIOTHORACIC VASCULAR SURGERY)

## 2018-10-04 PROCEDURE — 0WC94ZZ EXTIRPATION OF MATTER FROM RIGHT PLEURAL CAVITY, PERCUTANEOUS ENDOSCOPIC APPROACH: ICD-10-PCS | Performed by: THORACIC SURGERY (CARDIOTHORACIC VASCULAR SURGERY)

## 2018-10-04 PROCEDURE — 86850 RBC ANTIBODY SCREEN: CPT | Performed by: THORACIC SURGERY (CARDIOTHORACIC VASCULAR SURGERY)

## 2018-10-04 PROCEDURE — 86900 BLOOD TYPING SEROLOGIC ABO: CPT | Performed by: THORACIC SURGERY (CARDIOTHORACIC VASCULAR SURGERY)

## 2018-10-04 PROCEDURE — 25000132 ZZH RX MED GY IP 250 OP 250 PS 637: Performed by: INTERNAL MEDICINE

## 2018-10-04 PROCEDURE — 36415 COLL VENOUS BLD VENIPUNCTURE: CPT | Performed by: THORACIC SURGERY (CARDIOTHORACIC VASCULAR SURGERY)

## 2018-10-04 PROCEDURE — 40000986 XR CHEST PORT 1 VW

## 2018-10-04 PROCEDURE — 12000007 ZZH R&B INTERMEDIATE

## 2018-10-04 PROCEDURE — 36000093 ZZH SURGERY LEVEL 4 1ST 30 MIN: Performed by: THORACIC SURGERY (CARDIOTHORACIC VASCULAR SURGERY)

## 2018-10-04 PROCEDURE — 25000132 ZZH RX MED GY IP 250 OP 250 PS 637: Performed by: SURGERY

## 2018-10-04 PROCEDURE — 25000128 H RX IP 250 OP 636: Performed by: NURSE ANESTHETIST, CERTIFIED REGISTERED

## 2018-10-04 PROCEDURE — 36000063 ZZH SURGERY LEVEL 4 EA 15 ADDTL MIN: Performed by: THORACIC SURGERY (CARDIOTHORACIC VASCULAR SURGERY)

## 2018-10-04 PROCEDURE — 40000169 ZZH STATISTIC PRE-PROCEDURE ASSESSMENT I: Performed by: THORACIC SURGERY (CARDIOTHORACIC VASCULAR SURGERY)

## 2018-10-04 PROCEDURE — P9041 ALBUMIN (HUMAN),5%, 50ML: HCPCS | Performed by: NURSE ANESTHETIST, CERTIFIED REGISTERED

## 2018-10-04 PROCEDURE — 25000125 ZZHC RX 250: Performed by: NURSE ANESTHETIST, CERTIFIED REGISTERED

## 2018-10-04 PROCEDURE — 37000009 ZZH ANESTHESIA TECHNICAL FEE, EACH ADDTL 15 MIN: Performed by: THORACIC SURGERY (CARDIOTHORACIC VASCULAR SURGERY)

## 2018-10-04 PROCEDURE — 25000132 ZZH RX MED GY IP 250 OP 250 PS 637: Performed by: PHYSICIAN ASSISTANT

## 2018-10-04 PROCEDURE — 86901 BLOOD TYPING SEROLOGIC RH(D): CPT | Performed by: THORACIC SURGERY (CARDIOTHORACIC VASCULAR SURGERY)

## 2018-10-04 PROCEDURE — 85610 PROTHROMBIN TIME: CPT | Performed by: THORACIC SURGERY (CARDIOTHORACIC VASCULAR SURGERY)

## 2018-10-04 PROCEDURE — 71000012 ZZH RECOVERY PHASE 1 LEVEL 1 FIRST HR: Performed by: THORACIC SURGERY (CARDIOTHORACIC VASCULAR SURGERY)

## 2018-10-04 PROCEDURE — 99232 SBSQ HOSP IP/OBS MODERATE 35: CPT | Performed by: INTERNAL MEDICINE

## 2018-10-04 PROCEDURE — 37000008 ZZH ANESTHESIA TECHNICAL FEE, 1ST 30 MIN: Performed by: THORACIC SURGERY (CARDIOTHORACIC VASCULAR SURGERY)

## 2018-10-04 RX ORDER — ACETAMINOPHEN 325 MG/1
650 TABLET ORAL EVERY 4 HOURS PRN
Status: DISCONTINUED | OUTPATIENT
Start: 2018-10-04 | End: 2018-10-06 | Stop reason: HOSPADM

## 2018-10-04 RX ORDER — CLINDAMYCIN PHOSPHATE 900 MG/50ML
900 INJECTION, SOLUTION INTRAVENOUS
Status: COMPLETED | OUTPATIENT
Start: 2018-10-04 | End: 2018-10-04

## 2018-10-04 RX ORDER — ALBUMIN, HUMAN INJ 5% 5 %
SOLUTION INTRAVENOUS CONTINUOUS PRN
Status: DISCONTINUED | OUTPATIENT
Start: 2018-10-04 | End: 2018-10-04

## 2018-10-04 RX ORDER — SODIUM CHLORIDE, SODIUM LACTATE, POTASSIUM CHLORIDE, CALCIUM CHLORIDE 600; 310; 30; 20 MG/100ML; MG/100ML; MG/100ML; MG/100ML
INJECTION, SOLUTION INTRAVENOUS CONTINUOUS PRN
Status: DISCONTINUED | OUTPATIENT
Start: 2018-10-04 | End: 2018-10-04

## 2018-10-04 RX ORDER — FENTANYL CITRATE 50 UG/ML
25-50 INJECTION, SOLUTION INTRAMUSCULAR; INTRAVENOUS
Status: DISCONTINUED | OUTPATIENT
Start: 2018-10-04 | End: 2018-10-04 | Stop reason: HOSPADM

## 2018-10-04 RX ORDER — ONDANSETRON 2 MG/ML
INJECTION INTRAMUSCULAR; INTRAVENOUS PRN
Status: DISCONTINUED | OUTPATIENT
Start: 2018-10-04 | End: 2018-10-04

## 2018-10-04 RX ORDER — SODIUM CHLORIDE, SODIUM LACTATE, POTASSIUM CHLORIDE, CALCIUM CHLORIDE 600; 310; 30; 20 MG/100ML; MG/100ML; MG/100ML; MG/100ML
INJECTION, SOLUTION INTRAVENOUS CONTINUOUS
Status: DISCONTINUED | OUTPATIENT
Start: 2018-10-04 | End: 2018-10-04 | Stop reason: HOSPADM

## 2018-10-04 RX ORDER — FENTANYL CITRATE 50 UG/ML
INJECTION, SOLUTION INTRAMUSCULAR; INTRAVENOUS PRN
Status: DISCONTINUED | OUTPATIENT
Start: 2018-10-04 | End: 2018-10-04

## 2018-10-04 RX ORDER — MAGNESIUM HYDROXIDE 1200 MG/15ML
LIQUID ORAL PRN
Status: DISCONTINUED | OUTPATIENT
Start: 2018-10-04 | End: 2018-10-04 | Stop reason: HOSPADM

## 2018-10-04 RX ORDER — LIDOCAINE HYDROCHLORIDE 20 MG/ML
INJECTION, SOLUTION INFILTRATION; PERINEURAL PRN
Status: DISCONTINUED | OUTPATIENT
Start: 2018-10-04 | End: 2018-10-04

## 2018-10-04 RX ORDER — CLINDAMYCIN PHOSPHATE 900 MG/50ML
900 INJECTION, SOLUTION INTRAVENOUS SEE ADMIN INSTRUCTIONS
Status: DISCONTINUED | OUTPATIENT
Start: 2018-10-04 | End: 2018-10-04 | Stop reason: HOSPADM

## 2018-10-04 RX ORDER — HYDROMORPHONE HYDROCHLORIDE 1 MG/ML
.3-.5 INJECTION, SOLUTION INTRAMUSCULAR; INTRAVENOUS; SUBCUTANEOUS EVERY 5 MIN PRN
Status: DISCONTINUED | OUTPATIENT
Start: 2018-10-04 | End: 2018-10-04 | Stop reason: HOSPADM

## 2018-10-04 RX ORDER — ONDANSETRON 2 MG/ML
4 INJECTION INTRAMUSCULAR; INTRAVENOUS EVERY 30 MIN PRN
Status: DISCONTINUED | OUTPATIENT
Start: 2018-10-04 | End: 2018-10-04 | Stop reason: HOSPADM

## 2018-10-04 RX ORDER — ONDANSETRON 4 MG/1
4 TABLET, ORALLY DISINTEGRATING ORAL EVERY 30 MIN PRN
Status: DISCONTINUED | OUTPATIENT
Start: 2018-10-04 | End: 2018-10-04 | Stop reason: HOSPADM

## 2018-10-04 RX ORDER — PROPOFOL 10 MG/ML
INJECTION, EMULSION INTRAVENOUS PRN
Status: DISCONTINUED | OUTPATIENT
Start: 2018-10-04 | End: 2018-10-04

## 2018-10-04 RX ADMIN — FENTANYL CITRATE 50 MCG: 50 INJECTION, SOLUTION INTRAMUSCULAR; INTRAVENOUS at 18:35

## 2018-10-04 RX ADMIN — FERROUS SULFATE TAB 325 MG (65 MG ELEMENTAL FE) 325 MG: 325 (65 FE) TAB at 21:31

## 2018-10-04 RX ADMIN — SUCCINYLCHOLINE CHLORIDE 100 MG: 20 INJECTION, SOLUTION INTRAMUSCULAR; INTRAVENOUS; PARENTERAL at 16:10

## 2018-10-04 RX ADMIN — ACETAMINOPHEN 650 MG: 325 TABLET, FILM COATED ORAL at 20:26

## 2018-10-04 RX ADMIN — ROCURONIUM BROMIDE 10 MG: 10 INJECTION INTRAVENOUS at 17:02

## 2018-10-04 RX ADMIN — FERROUS SULFATE TAB 325 MG (65 MG ELEMENTAL FE) 325 MG: 325 (65 FE) TAB at 09:44

## 2018-10-04 RX ADMIN — ROCURONIUM BROMIDE 10 MG: 10 INJECTION INTRAVENOUS at 16:46

## 2018-10-04 RX ADMIN — ONDANSETRON 4 MG: 2 INJECTION INTRAMUSCULAR; INTRAVENOUS at 17:41

## 2018-10-04 RX ADMIN — SENNOSIDES AND DOCUSATE SODIUM 1 TABLET: 8.6; 5 TABLET ORAL at 21:31

## 2018-10-04 RX ADMIN — SODIUM CHLORIDE, POTASSIUM CHLORIDE, SODIUM LACTATE AND CALCIUM CHLORIDE: 600; 310; 30; 20 INJECTION, SOLUTION INTRAVENOUS at 13:56

## 2018-10-04 RX ADMIN — PROCHLORPERAZINE EDISYLATE 10 MG: 5 INJECTION INTRAMUSCULAR; INTRAVENOUS at 18:52

## 2018-10-04 RX ADMIN — PHENYLEPHRINE HYDROCHLORIDE 100 MCG: 10 INJECTION, SOLUTION INTRAMUSCULAR; INTRAVENOUS; SUBCUTANEOUS at 16:43

## 2018-10-04 RX ADMIN — SENNOSIDES AND DOCUSATE SODIUM 1 TABLET: 8.6; 5 TABLET ORAL at 09:44

## 2018-10-04 RX ADMIN — Medication: at 19:01

## 2018-10-04 RX ADMIN — SUGAMMADEX 140 MG: 100 INJECTION, SOLUTION INTRAVENOUS at 17:41

## 2018-10-04 RX ADMIN — SODIUM CHLORIDE, POTASSIUM CHLORIDE, SODIUM LACTATE AND CALCIUM CHLORIDE: 600; 310; 30; 20 INJECTION, SOLUTION INTRAVENOUS at 16:15

## 2018-10-04 RX ADMIN — Medication 0.5 MG: at 18:50

## 2018-10-04 RX ADMIN — PROPOFOL 200 MG: 10 INJECTION, EMULSION INTRAVENOUS at 16:10

## 2018-10-04 RX ADMIN — PHENYLEPHRINE HYDROCHLORIDE 150 MCG: 10 INJECTION, SOLUTION INTRAMUSCULAR; INTRAVENOUS; SUBCUTANEOUS at 16:16

## 2018-10-04 RX ADMIN — SODIUM CHLORIDE, POTASSIUM CHLORIDE, SODIUM LACTATE AND CALCIUM CHLORIDE: 600; 310; 30; 20 INJECTION, SOLUTION INTRAVENOUS at 19:14

## 2018-10-04 RX ADMIN — ALBUMIN (HUMAN): 12.5 SOLUTION INTRAVENOUS at 17:09

## 2018-10-04 RX ADMIN — FENTANYL CITRATE 50 MCG: 50 INJECTION, SOLUTION INTRAMUSCULAR; INTRAVENOUS at 16:32

## 2018-10-04 RX ADMIN — Medication 0.5 MG: at 18:17

## 2018-10-04 RX ADMIN — PHENYLEPHRINE HYDROCHLORIDE 100 MCG: 10 INJECTION, SOLUTION INTRAMUSCULAR; INTRAVENOUS; SUBCUTANEOUS at 16:41

## 2018-10-04 RX ADMIN — CLINDAMYCIN PHOSPHATE 900 MG: 18 INJECTION, SOLUTION INTRAVENOUS at 16:18

## 2018-10-04 RX ADMIN — PROPOFOL 50 MG: 10 INJECTION, EMULSION INTRAVENOUS at 16:11

## 2018-10-04 RX ADMIN — PHENYLEPHRINE HYDROCHLORIDE 150 MCG: 10 INJECTION, SOLUTION INTRAMUSCULAR; INTRAVENOUS; SUBCUTANEOUS at 16:19

## 2018-10-04 RX ADMIN — ACETAMINOPHEN 650 MG: 325 TABLET, FILM COATED ORAL at 09:44

## 2018-10-04 RX ADMIN — FENTANYL CITRATE 100 MCG: 50 INJECTION, SOLUTION INTRAMUSCULAR; INTRAVENOUS at 16:10

## 2018-10-04 RX ADMIN — FENTANYL CITRATE 50 MCG: 50 INJECTION, SOLUTION INTRAMUSCULAR; INTRAVENOUS at 18:22

## 2018-10-04 RX ADMIN — ROCURONIUM BROMIDE 10 MG: 10 INJECTION INTRAVENOUS at 16:10

## 2018-10-04 RX ADMIN — FENTANYL CITRATE 50 MCG: 50 INJECTION, SOLUTION INTRAMUSCULAR; INTRAVENOUS at 17:04

## 2018-10-04 RX ADMIN — LIDOCAINE HYDROCHLORIDE 40 MG: 20 INJECTION, SOLUTION INFILTRATION; PERINEURAL at 16:10

## 2018-10-04 RX ADMIN — ROCURONIUM BROMIDE 30 MG: 10 INJECTION INTRAVENOUS at 16:19

## 2018-10-04 ASSESSMENT — ACTIVITIES OF DAILY LIVING (ADL)
ADLS_ACUITY_SCORE: 9

## 2018-10-04 ASSESSMENT — COPD QUESTIONNAIRES: COPD: 0

## 2018-10-04 ASSESSMENT — LIFESTYLE VARIABLES: TOBACCO_USE: 1

## 2018-10-04 NOTE — PLAN OF CARE
Problem: Patient Care Overview  Goal: Plan of Care/Patient Progress Review  Outcome: Improving  VSS, Up w/SBA, afebrile. NPO since midnight, Pain controlled with PCA 0.3 dilaudid, ibuprofen, and robaxin. L LS clear, R LS diminished in all lobes, BS active, passing flatus. CT hooked up to -20 suction, patent with dark red drainage, pt expressed anxiety regarding procedure later today

## 2018-10-04 NOTE — PLAN OF CARE
Problem: Patient Care Overview  Goal: Plan of Care/Patient Progress Review  Outcome: No Change  A&O x4. SBA. NPO since midnight, voiding adequately. Chest tube dressing CDI, some bruising around the site. LS diminished on R side. PCA stopped per surgery. Pt went down to surgery at 1330. Family members present.

## 2018-10-04 NOTE — ANESTHESIA CARE TRANSFER NOTE
Patient: Rickie Lagos    Procedure(s):  RIGHT VIDEO ASSISTED THORACOSCOPIC SURGERY, DRAINAGE OF HEMOTHORAX, REMOVAL OF INTRAPLEURAL CLOTS - Wound Class: I-Clean    Diagnosis: RIGHT HEMOTHORAX  Diagnosis Additional Information: No value filed.    Anesthesia Type:   General, ETT     Note:  Airway :Face Mask  Patient transferred to:PACU  Comments: Neuromuscular blockade reversed after TOF 4/4, spontaneous respirations, adequate tidal volumes, followed commands to voice, oropharynx suctioned with soft flexible catheter, extubated atraumatically, extubated with suction, airway patent after extubation.  Oxygen via facemask at 4 liters per minute to PACU. Oxygen tubing connected to wall O2 in PACU, SpO2, NiBP, and EKG monitors and alarms on and functioning, Nathalia Hugger warmer connected to patient gown, report on patient's clinical status given to PACU RN, RN questions answered. Handoff Report: Identifed the Patient, Identified the Reponsible Provider, Reviewed the pertinent medical history, Discussed the surgical course, Reviewed Intra-OP anesthesia mangement and issues during anesthesia, Set expectations for post-procedure period and Allowed opportunity for questions and acknowledgement of understanding      Vitals: (Last set prior to Anesthesia Care Transfer)    CRNA VITALS  10/4/2018 1733 - 10/4/2018 1813      10/4/2018             Pulse: 89    SpO2: 100 %    Resp Rate (observed): 23                Electronically Signed By: VANNESSA Piedra CRNA  October 4, 2018  6:13 PM

## 2018-10-04 NOTE — PROGRESS NOTES
Vascular Surgery Progress Note    S: no complaints.   Breathing easily.   Good appetite.    O:   Vitals:  BP  Min: 113/70  Max: 123/74  Temp  Av.1  F (36.7  C)  Min: 97.8  F (36.6  C)  Max: 98.4  F (36.9  C)  Pulse  Av  Min: 91  Max: 106  I/O last 3 completed shifts:  In: 680 [P.O.:600; I.V.:80]  Out: 1350 [Urine:900; Chest Tube:450]    Physical Exam:  Alert   Breathing easily.  Axilla=soft  Right arm = OK       CT=serosang with 450 ml/24 hrs but only 20 ml last shift      CTA=  Still clot in lower chest.  SCV likely occluded    AM CXR= slightly better.    Assessment/Plan: To OR with Dr Morelos for VATs to wash out right chest hematoma.                                 Heparin on hold.      Wm. Maryanne MD

## 2018-10-04 NOTE — PROGRESS NOTES
HOSPITALIST CONSULT CHART CHECK:     Hospitalist service was consulted for cross coverage only. We will peripherally follow and chart check throughout the week.       - For vascular medical concerns during business hours M-F, call the [facility] Vascular [facility] at [phone] to have the rounding/on call Vascular Medicine (NOT VASCULAR SURGERY) MD paged.     - After business hours M-F, for medical concerns on this patient, please page hospitalist staff.     - For vascular surgical questions, please page the appropriate surgeon (primary vascular surgeon or on call vascular surgeon) based upon the time of day.     VANNESSA Torres, CNP  Hospitalist Service, House Officer  [facility]     Text Page  Pager: [phone]

## 2018-10-04 NOTE — PROGRESS NOTES
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  Right transaxillary first rib resection.   a.  Anterior and middle scalenectomy.   b.  Mobilization of right subclavian vein on 9/28/18    S/p Rt CT tube placed for Hemothorax  9/30/18      POD 6  Assessment:   -S/P lysis / mechanical thrombectomy 9/26 - 9/27  -first rib resection 9/28, was on therapeutic AC post-op with IV UFH  -Patient with chest pain, shortness of breath, tachycardia 9/30. Initial CXR with no signs of pneumothorax/hemothorax, only evidence of pleural tear at the upper right lung zone. Follow-up emergent CT later in evening with large hemothorax. Chest tube placed.  -Repeat CTA on 10/3/18 with significant residual clots in right lung.   -HGB stable/up at 8.9. Transfused 3 units PRBC on 10/1   -Pain under control.   -Moon out, voiding (had trouble initially with urinary retention post-op)    Plan:   -To OR for VATS later today with Dr. Morelos   -Pain control  -Patient is very anxious - on meds                Interval History:   doing well;  cp, sob, n/v/d, or abd pain. Some pain in back near incision on right. Feeling ok. Chest tube in place. Hgb stable though.              Review of Systems:   The 10 point Review of Systems is negative other than noted in the HPI             Medications:       ferrous sulfate  325 mg Oral BID     senna-docusate  1-2 tablet Oral BID     sodium chloride (PF)  3 mL Intracatheter Q8H                  Physical Exam:     Patient Vitals for the past 24 hrs:   BP Temp Temp src Pulse Heart Rate Resp SpO2   10/04/18 0741 114/70 97.7  F (36.5  C) Oral - 87 18 94 %   10/04/18 0555 - - - - - 18 96 %   10/04/18 0400 - - - - - 18 94 %   10/03/18 2332 119/76 98.1  F (36.7  C) Oral 94 - 18 95 %   10/03/18 1928 122/79 98.4  F (36.9  C) Oral  101 99 18 94 %   10/03/18 1558 113/70 98.2  F (36.8  C) Oral 106 103 18 93 %   10/03/18 1241 123/74 97.8  F (36.6  C) Oral 91 - 18 93 %     Wt Readings from Last 4 Encounters:   10/01/18 69.2 kg (152 lb 8.9 oz)   09/26/18 78.4 kg (172 lb 12.8 oz)   09/21/18 76.8 kg (169 lb 4.8 oz)   09/16/18 76.7 kg (169 lb)     Constitutional: normal  Eyes: normal  ENT: normal  Neck: Supple, symmetrical  Lungs: No increased work of breathing, decreased BS on right, chest tube in place with bloody output.   Cardiovascular: Regular rate and rhythm, normal S1 and S2, no S3 or S4, and no murmur noted.  Abdomen: normal  Musculoskeletal: No redness, warmth, or swelling of the joints.  Full range of motion noted.  Motor strength is 5 out of 5 all extremities bilaterally.  Tone is normal.  Neurologic: Awake, alert, oriented to name, place and time.  Cranial nerves II-XII are grossly intact.  Motor is 5 out of 5 bilaterally.    Neuropsychiatric: normal  Skin: Surgical site dressed.            Data:     Results for orders placed or performed during the hospital encounter of 09/26/18 (from the past 24 hour(s))   INR   Result Value Ref Range    INR 1.02 0.86 - 1.14   CTA Chest with Contrast    Narrative    CTA CHEST WITH CONTRAST   10/3/2018 2:53 PM     HISTORY: 27-year-old male status post surgical decompression of the  thoracic outlet due to thrombosis of the right subclavian vein.  Patient also underwent mechanical thrombectomy and balloon angioplasty  of the right subclavian vein. Following this procedure, the patient  developed a right hemothorax.    TECHNIQUE:  CT of the chest was performed following the administration  of 80mL Isovue-370. Radiation dose for this scan was reduced using  automated exposure control, adjustment of the mA and/or kV according  to patient size, or iterative reconstruction technique.    COMPARISON: Venogram dated 9/29/2018, CT of the chest dated 9/30/2018.    FINDINGS:  Since the previous CT, a large bore  right-sided chest tube  has been placed. This courses into the right thorax in between the  lateral right fifth and sixth ribs. The tip of the chest tube is  located in the subcarinal region abutting the azygos vein. There has  been a significant decrease in blood/fluid in the right pleural space.  There are tiny pockets of pleural air in the right thorax. There is a  small amount of subcutaneous emphysema in the right chest wall and  axillary region. No active extravasation is identified. Atelectatic  changes are noted primarily in the right lower lobe. Previously seen  right axillary drain has been removed.    The contrast bolus was via the left side and therefore, the right  subclavian and axillary veins are not well opacified to evaluate for  patency.      Impression    IMPRESSION: Interval decrease in size of a right hemothorax post  placement of a large-bore right-sided chest tube. The medial end of  the chest tube extends into the subcarinal region.    SILAS WATT MD   XR Chest Port 1 View    Narrative    CHEST ONE VIEW PORTABLE       PROCEDURE DATE:  10/4/2018 5:30 AM     HISTORY:   follow up hemothorax;     COMPARISON:  Radiograph 10/3/2018, chest CT 10/3/2018.    FINDINGS/    Impression    IMPRESSION:   Right chest tube is present with the tip overlying the right hilar  region. Small residual right hydropneumothorax is not significantly  changed from prior. Left lung appears clear without pleural effusion.  Cardiomediastinal silhouette is within normal limits. Subcutaneous  emphysema within the right lateral chest wall.    JESSICA HIGH MD   CBC with platelets   Result Value Ref Range    WBC 9.0 4.0 - 11.0 10e9/L    RBC Count 3.01 (L) 4.4 - 5.9 10e12/L    Hemoglobin 8.9 (L) 13.3 - 17.7 g/dL    Hematocrit 26.4 (L) 40.0 - 53.0 %    MCV 88 78 - 100 fl    MCH 29.6 26.5 - 33.0 pg    MCHC 33.7 31.5 - 36.5 g/dL    RDW 15.0 10.0 - 15.0 %    Platelet Count 337 150 - 450 10e9/L   Basic metabolic panel    Result Value Ref Range    Sodium 137 133 - 144 mmol/L    Potassium 4.2 3.4 - 5.3 mmol/L    Chloride 99 94 - 109 mmol/L    Carbon Dioxide 30 20 - 32 mmol/L    Anion Gap 8 3 - 14 mmol/L    Glucose 100 (H) 70 - 99 mg/dL    Urea Nitrogen 9 7 - 30 mg/dL    Creatinine 0.77 0.66 - 1.25 mg/dL    GFR Estimate >90 >60 mL/min/1.7m2    GFR Estimate If Black >90 >60 mL/min/1.7m2    Calcium 8.6 8.5 - 10.1 mg/dL   INR   Result Value Ref Range    INR 1.06 0.86 - 1.14   ABO/Rh type and screen   Result Value Ref Range    ABO AB     RH(D) Pos     Antibody Screen Neg     Test Valid Only At [facility]        Specimen Expires 10/07/2018    Heparin 10a Level   Result Value Ref Range    Heparin 10A Level <0.10 IU/mL

## 2018-10-04 NOTE — BRIEF OP NOTE
[facility] Brief Operative Note    Pre-operative diagnosis: RIGHT HEMOTHORAX   Post-operative diagnosis right hemothorax     Procedure: Procedure(s):  RIGHT VIDEO ASSISTED THORACOSCOPIC SURGERY, DRAINAGE OF HEMOTHORAX, REMOVAL OF INTRAPLEURAL CLOTS - Wound Class: I-Clean   Surgeon(s): Surgeon(s) and Role:     * Sean Morelos MD - Primary     * Nasima Jorgensen PA-C - Assisting   Estimated blood loss: 100 mL    Specimens: * No specimens in log *   Findings: Large amount of clot- no active hemorrhage-

## 2018-10-04 NOTE — ANESTHESIA PREPROCEDURE EVALUATION
Procedure: Procedure(s):  TRANSAXILLARY RESECT FIRST RIB  Preop diagnosis: THORACIC OUTLET SYNDROME     Allergies   Allergen Reactions     Amoxicillin Rash     Past Medical History:   Diagnosis Date     DVT of axillary vein, acute right (H) 09/16/2018     TOS (thoracic outlet syndrome) 09/16/2018     Past Surgical History:   Procedure Laterality Date     HC TOOTH EXTRACTION W/FORCEP Bilateral     wisdom tooth extraction     TRANSAXILLARY RESECT FIRST RIB Right 9/28/2018    Procedure: TRANSAXILLARY RESECT FIRST RIB;  RIGHT TRANSAXILLARY FIRST RIB RESECTION, SCALENECTOMY ;  Surgeon: Kong Madden MD;  Location: [facility]     XR EXTREMITY VENOGRAM INJ RIGHT Right 09/16/2018     Social History   Substance Use Topics     Smoking status: Current Every Day Smoker     Types: Other     Smokeless tobacco: Never Used      Comment: Patient vapes     Alcohol use Yes      Comment: very occasionally     Prior to Admission medications    Medication Sig Start Date End Date Taking? Authorizing Provider   HYDROcodone-acetaminophen (NORCO) 5-325 MG per tablet Take 1 tablet by mouth every 6 hours as needed for severe pain Every 4 hours to every 6 hours as needed 9/19/18  Yes Nemo Montalvo MD   LORazepam (ATIVAN) 0.5 MG tablet Take 1-2 tablets (0.5-1 mg) by mouth every 8 hours as needed (taken 1 hour before the procedure) Do not drive after taking this medication 9/21/18  Yes Chalo Mazariegos MD   rivaroxaban ANTICOAGULANT (XARELTO) 15 MG TABS tablet Take 1 tablet (15 mg) by mouth 2 times daily (with meals) 9/19/18  Yes Suzan Sweet PA-C   traMADol (ULTRAM) 50 MG tablet Take 1 tablet (50 mg) by mouth every 6 hours as needed for moderate to severe pain or severe pain 9/28/18  Yes Kwesi Bonilla MD     Current Facility-Administered Medications Ordered in Epic   Medication Dose Route Frequency Last Rate Last Dose     [Auto Hold] acetaminophen (TYLENOL) tablet 650 mg  650 mg Oral Q4H PRN   650 mg at  10/04/18 0944     [Auto Hold] benzocaine-menthol (CHLORASEPTIC) 6-10 MG lozenge 1 lozenge  1 lozenge Buccal Q1H PRN         clindamycin (CLEOCIN) infusion 900 mg  900 mg Intravenous Pre-Op/Pre-procedure x 1 dose         clindamycin (CLEOCIN) infusion 900 mg  900 mg Intravenous See Admin Instructions         [Auto Hold] glucose gel 15-30 g  15-30 g Oral Q15 Min PRN        Or     [Auto Hold] dextrose 50 % injection 25-50 mL  25-50 mL Intravenous Q15 Min PRN        Or     [Auto Hold] glucagon injection 1 mg  1 mg Subcutaneous Q15 Min PRN         [Auto Hold] diphenhydrAMINE (BENADRYL) capsule 25 mg  25 mg Oral Q6H PRN        Or     [Auto Hold] diphenhydrAMINE (BENADRYL) injection 25 mg  25 mg Intravenous Q6H PRN         [Auto Hold] ferrous sulfate (IRON) tablet 325 mg  325 mg Oral BID   325 mg at 10/04/18 0944     HYDROmorphone (DILAUDID) PCA 1 mg/mL OPIOID NAIVE   Intravenous Continuous         [Auto Hold] ibuprofen (ADVIL/MOTRIN) tablet 600 mg  600 mg Oral Q6H PRN   600 mg at 10/03/18 2339     lactated ringers infusion   Intravenous Continuous         [Auto Hold] lidocaine (LMX4) cream   Topical Q1H PRN         [Auto Hold] lidocaine 1 % 1 mL  1 mL Other Q1H PRN   1 mL at 09/30/18 2230     [Auto Hold] LORazepam (ATIVAN) tablet 0.5 mg  0.5 mg Oral Q8H PRN   0.5 mg at 10/03/18 1129     [Auto Hold] methocarbamol (ROBAXIN) tablet 750 mg  750 mg Oral 4x Daily PRN   750 mg at 10/03/18 1931     [Auto Hold] naloxone (NARCAN) injection 0.1-0.4 mg  0.1-0.4 mg Intravenous Q2 Min PRN         [Auto Hold] ondansetron (ZOFRAN-ODT) ODT tab 4 mg  4 mg Oral Q6H PRN   4 mg at 10/03/18 0942    Or     [Auto Hold] ondansetron (ZOFRAN) injection 4 mg  4 mg Intravenous Q6H PRN   4 mg at 10/03/18 1554     [Auto Hold] oxyCODONE IR (ROXICODONE) tablet 5-10 mg  5-10 mg Oral Q4H PRN   10 mg at 09/30/18 1831     [Auto Hold] prochlorperazine (COMPAZINE) injection 5 mg  5 mg Intravenous Q6H PRN   5 mg at 10/02/18 1101     [Auto Hold] senna-docusate  (SENOKOT-S;PERICOLACE) 8.6-50 MG per tablet 1-2 tablet  1-2 tablet Oral BID   1 tablet at 10/04/18 0944     [Auto Hold] sodium chloride (PF) 0.9% PF flush 3 mL  3 mL Intracatheter Q1H PRN         [Auto Hold] sodium chloride (PF) 0.9% PF flush 3 mL  3 mL Intracatheter Q8H   3 mL at 10/03/18 0818     sodium chloride 0.9% infusion   Intravenous Continuous 10 mL/hr at 10/02/18 0829       Current Outpatient Prescriptions Ordered in [facility]   Medication     traMADol (ULTRAM) 50 MG tablet       Wt Readings from Last 1 Encounters:   10/01/18 69.2 kg (152 lb 8.9 oz)     Temp Readings from Last 1 Encounters:   10/04/18 36.7  C (98  F) (Oral)     BP Readings from Last 6 Encounters:   10/04/18 120/67   09/26/18 120/77   09/21/18 116/63   09/19/18 135/82   09/16/18 101/70     Pulse Readings from Last 4 Encounters:   10/03/18 94   09/26/18 81   09/21/18 117   09/17/18 55     Resp Readings from Last 1 Encounters:   10/04/18 18     SpO2 Readings from Last 1 Encounters:   10/04/18 97%     Recent Labs   Lab Test  09/27/18   0423  09/26/18   1930   NA  142  142   POTASSIUM  4.0  3.9   CHLORIDE  109  108   CO2  26  27   ANIONGAP  7  7   GLC  91  85   BUN  11  12   CR  0.88  0.89   JC  8.6  8.8     No results for input(s): AST, ALT, ALKPHOS, BILITOTAL, LIPASE in the last 57713 hours.  Recent Labs   Lab Test  09/27/18   0423  09/26/18   1930   WBC  6.6  7.1   HGB  13.1*  13.4   PLT  261  278     No results for input(s): ABO, RH in the last 47260 hours.  Recent Labs   Lab Test  09/28/18   0325  09/27/18   1833   09/26/18   1930  09/26/18   1348   INR   --    --    --   1.33*  1.56*   PTT  118*  225*   < >  56*  49*    < > = values in this interval not displayed.      No results for input(s): TROPI in the last 97925 hours.  No results for input(s): PH, PCO2, PO2, HCO3 in the last 70507 hours.  No results for input(s): HCG in the last 18637 hours.  Recent Results (from the past 744 hour(s))    Upper Extremity Venous Duplex Right     Narrative    US UPPER EXTREMITY VENOUS DUPLEX RIGHT  9/16/2018 4:55 PM     HISTORY: Paresthesias, color change.    COMPARISON: None.    TECHNIQUE: Examination of the upper extremity veins was performed with  graded compression and 2-D ultrasound and color doppler spectral  waveform analysis.     FINDINGS:  There is occlusive thrombus in the right subclavian vein  through the axillary vein. The basilic and cephalic and brachial veins  are patent.  The veins in the forearm show no evidence of thrombosis.      Impression    IMPRESSION: Occlusive thrombus in the right subclavian and axillary  veins.        LAUREEN MENDIETA MD   Chest CT, IV contrast only - PE protocol    Narrative    CT CHEST PULMONARY EMBOLISM WITH CONTRAST  9/16/2018 6:22 PM     HISTORY:   Shortness of breath. Evaluate for pulmonary embolism.    TECHNIQUE:    Helical axial scans from lung apices through lung bases  with 65 mL Isovue-370 IV contrast. Radiation dose for this scan was  reduced using automated exposure control, adjustment of the mA and/or  kV according to patient size, or iterative reconstruction technique.    COMPARISON:    None.    FINDINGS:    There is no CT evidence for pulmonary embolism or other  acute vascular abnormality of the chest. The mediastinal and hilar  structures are unremarkable. The lungs are clear bilaterally.  Visualized upper abdomen shows mild splenomegaly and no other  abnormality.      Impression    IMPRESSION:  1. No evidence for pulmonary embolism.  2. Mild splenomegaly.      JAMES CLAYTON MD   XR Chest 1 View    Narrative    XR CHEST 1 VW 9/17/2018 10:55 AM     HISTORY: hx of subclavian/axillary DVT; please eval for cervical rib;     COMPARISON: None      Impression    IMPRESSION: The cardiac silhouette and pulmonary vasculature are  normal. The lungs are clear. There is no evidence of a cervical rib.    DAQUAN LISA MD   IR Upper Extremity Venogram Right    Addendum: 9/28/2018    Total sedation time 37  minutes.    JESSICA MALDONADO MD      Narrative    PROCEDURE:     Right upper extremity venogram  Right Central venogram  Initiation chemical thrombolysis.    DATE OF PROCEDURE:  9/17/2018 1:56 PM    OPERATORS:    Jessica Maldonado MD    MEDICATIONS:  1% lidocaine SQ, 2.5 mg IV Versed, 125 mcg IV fentanyl, 6 mg IV TPA    CONTRAST:    12    REFERENCED AIR KERMA: 18 mGy  FLUOROSCOPY TIME: 4 minutes    ESTIMATED BLOOD LOSS:  Minimal    COMPLICATIONS:  None    PRE-PROCEDURE DIAGNOSIS: Right axillary and subclavian venous thrombus  POST-PROCEDURE DIAGNOSIS: Same    CLINICAL HISTORY/INDICATION:  27-year-old male with unprovoked right axillary and subclavian venous  thrombus presents for catheter directed thrombolysis.    PROCEDURE AND FINDINGS:    Following a discussion of the risks, benefits, indications and  alternatives to treatment, appropriate informed consent was obtained.   The patient was brought to the interventional radiology suite and  placed supine on the table. The patient's right arm was prepped and  draped using maximum sterile barrier technique for tunneled line  placement including: cap AND mask AND sterile gown AND sterile gloves  AND sterile full body drape AND hand hygiene AND skin preparation 2%  chlorhexidine for cutaneous antisepsis (or acceptable alternative  antiseptics).  A timeout was performed per hospital universal protocol  policy to ensure correct patient, site, and procedure to be performed.    Ultrasound was utilized to evaluate the veins of the right arm and a  permanent record of the image was obtained, which demonstrated the  basilic vein to be patent. Under direct ultrasound guidance, 1%  lidocaine was infiltrated and access was gained easily into the  basilic vein utilizing micropuncture technique.    A guide wire was then advanced into the vein. Exchange made for a 5  Kisw[place]ili micropuncture dilator. Right upper extremity venogram was  performed showing patent brachial and basilic veins. The  axillary vein  is occluded. Exchange was then made for a 6 [place] vascular sheath  through which a 0.035 inch Glidewire and angled Berenstein catheter  were used in tandem to traverse the axillary occlusion. With the  catheter tip within the subclavian vein, digital subtraction  angiography was performed showing occlusive and nonocclusive thrombus  within the subclavian vein extending into numerous sidebranches.  Contrast flows via collateral pathways into the SVC. The wire and  catheter were then used to traverse the occluded subclavian vein into  the SVC. With the catheter tip in the superior vena cava, digital  subtraction angiography was performed showing widely patent superior  vena cava without evidence of caval thrombus. The catheter was removed  over a wire and a 20 cm infusion length catheter was advanced over the  wire and positioned with the leading marker in the superior vena cava  and the trailing marker in the trailing axillary vein. 6 mg of TPA was  bolused through the infusion catheter. The sheath was sutured in place  with a 2-0 Ethilon suture and the infusion catheter secured.     Throughout the procedure, the patient was monitored by a radiology  nurse for cardiac rhythm which remained stable. The patient tolerated  the procedure well and left the interventional radiology suite in  stable condition.      Impression    IMPRESSION:     1.  Right upper extremity and central venogram showing occlusive and  nonocclusive thrombus present within the axillary and subclavian  veins. The superior vena cava is patent. Peripheral brachial and  basilic veins are patent.  2.  Initiation thrombolytic therapy via a 20 cm infusion length  catheter extending from the axillary vein to the SVC.    PLAN:  TPA will be administered at a rate of 1 mg per hour via the infusion  catheter  Heparin 500 units IV via the side arm of the vascular sheath.    JESSICA HIGH MD   IR Angiogram through Catheter Follow Up     Addendum: 9/21/2018    Procedure was performed on 9/18/2018.    Comparison is made to the exam dated 9/17/2018.    SILAS WATT MD      Narrative    INTERVENTIONAL RADIOLOGY ANGIOGRAM THROUGH CATHETER FOLLOW UP  September 19, 2018 4:43 PM     HISTORY: Patient undergoing catheter directed thrombolysis to treat  thrombus in the right medial subclavian vein.    COMPARISON: 9/18/2018.    DESCRIPTION OF PROCEDURE: The patient was placed in a supine position  on the fluoroscopy table. The right arm including external portions of  existing right arm sheath and infusion catheter were prepped and  draped in the usual sterile manner. 1% lidocaine was injected for  local anesthesia. A venogram was performed through existing infusion  catheter.    FINDINGS: There had been significant interval lysis of thrombus from  the medial right subclavian vein. Severe stenosis within the  subclavian vein was identified. This was treated with balloon  angioplasty using a 12 mm and 14 mm balloons. Follow-up venogram  showed improvement in luminal diameter and improved flow however flow  remained fairly slow with persistent filling of multiple collaterals  lateral to the right subclavian vein. Therefore, it was elected to  continue thrombolysis for an additional night. A 5 Romanian infusion  catheter was deployed across the medial subclavian vein and catheter  directed thrombolysis was restarted.    I determined this patient to be an appropriate candidate for the  planned sedation and procedure and reassessed the patient immediately  prior to sedation and procedure. The patient tolerated the procedure  well. There were no immediate postprocedure complications. The  patient's vital signs were monitored by radiology nursing staff under  my supervision and remained stable throughout the study.     MEDICATIONS: 4 mg Versed, 200 mcg fentanyl.    Sedation time: 51 minutes.    Fluoroscopy time: 3.8 minutes.    Total fluoroscopy dose: 15.2  mGy.    Contrast: 30 mL Isovue.      Impression    IMPRESSION: Venography shows improvement in amounts of thrombus from  the right subclavian vein. A stenosis within the vein was  angioplastied as above. There remains some residual thrombus with  obstruction to flow. Therefore, catheter directed thrombolysis will be  continued overnight. TPA check would be performed tomorrow.    SILAS WATT MD   IR Angiogram through Catheter Follow Up    Narrative    PROCEDURE(S):  1.  Right subclavian and central venogram  2.  AngioJet mechanical/aspiration thrombectomy  3.  Balloon maceration of subclavian thrombus.  4.  Conventional angioplasty of central subclavian vein    DATE OF PROCEDURE:  9/19/2018 12:32 PM    OPERATORS:    Jey Maldonado MD    MEDICATIONS:   1% Lidocaine SQ, Versed 3 mg IV, Fentanyl 150 mcg IV    CONTRAST:   50 mL Isovue 370 IV    REFERENCED AIR KERMA: 26 mGy  FLUOROSCOPY TIME: 7 minutes     ESTIMATED BLOOD LOSS:   Minimal    COMPLICATIONS:   None    PRE-PROCEDURE DIAGNOSIS: Subclavian vein thrombus  POST-PROCEDURE DIAGNOSIS: Same    CLINICAL HISTORY/INDICATION:   27-year-old male with suspected thoracic outlet syndrome and  subclavian vein thrombus now 2 days post initiation of thrombolytic  therapy.    PROCEDURES AND FINDINGS:  Following a discussion of the risks, benefits, indications, and  alternatives to treatment, informed consent was obtained. The patient  was brought to the interventional radiology suite and placed supine on  the table. The right arm inclusive of the infusion catheter and  vascular sheath were prepped and draped in a routine sterile fashion.  A timeout was performed per hospital universal protocol policy to  ensure correct patient, site, and procedure to be performed.      Contrast was injected via the side arm of the vascular sheath and  digital subtraction angiography was performed showing nonocclusive  thrombus within the peripheral subclavian vein and occlusive  thrombus  within the central subclavian vein. The innominate and SVC filter via  collateral vessels. Indication catheter was removed over a wire and  digital subtraction angiography was repeated again showing occlusive  thrombus within the central subclavian vein and nonocclusive thrombus  in the more peripheral subclavian vein. Vascular sheath was exchanged  for an 8 Slovak vascular sheath and 8 Slovak AngioJet  mechanical/aspiration thrombectomy was performed of the subclavian  vein. Post AngioJet angiography shows decreased thrombus burden with  now only partially occlusive thrombus within the central subclavian  vein. 10 mm angioplasty followed by balloon sweep was performed of the  subclavian vein. This is followed by 12 mm angioplasty of the central  subclavian vein. Completion imaging shows antegrade flow through the  subclavian vein with narrowing of the central portion. No residual  thrombus is seen. Decreased filling of collateral vessels.    Throughout the procedure, the patient was monitored by a radiology  nurse for cardiac rhythm, blood pressure and oxygen saturation which  remained stable. Total sedation time was 34 minutes. The patient  tolerated the procedure well and left the interventional radiology  suite in stable condition.      Impression    IMPRESSION:  1.  Aspiration/mechanical thrombectomy followed by balloon maceration  and Central subclavian angioplasty with no residual thrombus seen on  completion angiogram.    JESSICA HIGH MD   US Upper Extremity Venous Duplex Right    Narrative    PROCEDURE:  Venous Doppler ultrasound of the right upper extremity    DATE OF PROCEDURE:  9/26/2018 11:06 AM    CLINICAL HISTORY/INDICATION:   27-year-old male with thoracic outlet syndrome status post upper  extremity venous lysis completed 9/19/2018.    COMPARISON:   9/16/2018.    TECHNIQUE:   Grayscale, color-flow, and spectral waveform analysis were performed  of the deep veins of the right upper  extremity    FINDINGS:   The right jugular vein demonstrates normal compressibility, color-flow  and spectral waveform.    There is occlusive thrombus within the subclavian vein. The axillary,  cephalic, brachial, radial and ulnar veins are all patent. There is  nonocclusive thrombus within the right basilic vein are likely  associated with catheter access site during thrombolytic therapy.    Rouleaux flow within the axillary vein.      Impression    IMPRESSION:   1.  Occlusive thrombus within the right subclavian vein.  2.  Nonocclusive thrombus in right basilic vein likely associated with  prior vascular sheath.    Results communicated with Kacey Sweet PA-C 9/26/2018 12:48 PM    JESSICA HIGH MD   IR Upper Extremity Venogram Right    Narrative    IR UPPER EXTREMITY VENOGRAM RIGHT 9/26/2018 3:58 PM    HISTORY: 27-year-old patient with history of thoracic outlet syndrome.  Patient underwent thrombolysis and angioplasty of right subclavian  vein in preparation for resection of right first rib. The patient has  reoccluded in the interim.    TECHNIQUE: Patient was brought to interventional radiology department  and informed consent obtained. Patient was placed in a supine  position. Given lack of palpable veins, ultrasound was used to  visualize the right upper arm basilic vein. Nonocclusive thrombus  noted in the mid to distal basilic vein. Access was obtained in a  patent segment of the vein more central. Given lack of palpable veins,  ultrasound was used to visualize the basilic vein and image stored for  documentation. After 1% lidocaine, a micropuncture kit was used to  access the basilic vein. Venogram was then performed in the right  upper extremity. 6 Romanian sheath was placed. KENNY 1 catheter was  advanced to the axillary vein where venogram performed. Subclavian  vein found to be occluded. Catheter was advanced through the occluded  subclavian vein and into the right innominate vein where  venogram  performed. A 10 cm infusion length catheter was then placed throughout  the subclavian vein. Completion venogram performed demonstrating  appropriate position. Overall clot burden is relatively low.    Sedation: 4 mg IV Versed, 200 mcg IV fentanyl.  Sedation time: 25 minutes.  Please note the above medications were administered by the  Interventional Radiology Staff under my direct supervision. The  patient's vital signs were monitored and remained stable throughout  the procedure.  Fluoroscopic time: 1.6 minutes  Total fluoroscopic dose: 8.65 mGy.  Contrast: 20 mL Isovue administered intravenously without  complication.  Local anesthetic: 2 mL of 1% lidocaine.    FINDINGS: Total of four spot fluoroscopic images and venogram  sequences obtained throughout the procedure. Occlusion again noted in  the subclavian vein. Appropriate placement of infusion catheter at  completion.      Impression    IMPRESSION: Thrombolysis to begin in the right subclavian vein due to  thoracic outlet syndrome. TPA will begin with 10 cm infusion length  catheter and 0.5 mg/hour and heparin through basilic vein sheath at  500 units/hour. Patient will return the following day for repeat  venogram. As of now, plan is for surgical resection of the first rib  in two days.    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    COMPARISON: Venogram dated 9/19/2018    DESCRIPTION OF PROCEDURE: The patient was placed in a supine position  on the fluoroscopy table. A venogram was performed through existing  right arm sheath as well as and infusion catheter.    FINDINGS: There had been some interval thrombolysis of thrombus within  the right subclavian vein. There remained stagnant flow within the  right axillary  and lateral subclavian veins.    Intervention: Mechanical thrombectomy was performed using an AngioJet  device and  device. Balloon angioplasty was performed with a 10  mm balloon. The site of angioplasty was evaluated angiographically as  well as with endovascular ultrasound. A small lumen was achieved  measuring approximately 7 mm in diameter on endovascular ultrasound.  There was spontaneous flow across the right subclavian vein which had  not been seen previously. At this time, it was elected to accept these  results. The patient will undergo a rib resection tomorrow. Patient  will likely need a repeat venography and possible reintervention on  the subclavian vein at that time. Patient will be maintained on  adequate anticoagulation prior to rib resection and following rib  resection.    The right arm sheath was removed. Pressure was held at the puncture  site for 10 minutes with good hemostasis.    I determined this patient to be an appropriate candidate for the  planned sedation and procedure and reassessed the patient immediately  prior to sedation and procedure. The patient tolerated the procedure  well. There were no immediate postprocedure complications. The  patient's vital signs were monitored by radiology nursing staff under  my supervision and remained stable throughout the study.     MEDICATIONS: 3 mg Versed, 150 mcg fentanyl    Sedation time: 40 minutes    TPA 2 mg, Zofran 4 mg, heparin 4000 units    Fluoroscopy time: 5.9 minutes    Total fluoroscopy dose: 38 mGy    Contrast: 60 mL Isovue      Impression    IMPRESSION: Post catheter directed thrombolysis, mechanical  thrombectomy, antegrade flow through the medial right subclavian vein  has been reestablished. There remains a moderate stenosis in the right  subclavian vein. This will likely need to be re-intervened upon  following first rib resection.    SILAS WATT MD   XR Chest Port 1 View    Narrative    CHEST ONE VIEW UPRIGHT  9/28/2018 5:33 PM     HISTORY: Status post right 1st rib resection with known pleural tear;  evaluate for pneumothorax.     COMPARISON: 9/17/2018      Impression    IMPRESSION: First rib resection changes noted. Chest tube at the right  apex. No definite pneumothorax evident. Lungs grossly clear.    TAWNYA PANIAGUA MD   IR Upper Extremity Venogram Right    Narrative    INTERVENTIONAL RADIOLOGY RIGHT UPPER EXTREMITY VENOGRAM  9/29/2018  10:54 AM     HISTORY:  27-year-old male with right subclavian vein deep venous  thrombus status post catheter-directed thrombolysis. Yesterday, the  patient underwent a right transaxillary first rib resection, anterior  and middle scalenectomy, and mobilization of the right subclavian  vein.    COMPARISON: Venogram dated 9/27/2018.    FINDINGS: After obtaining informed consent, the patient was placed in  a supine position on the fluoroscopy table. The right arm was prepped  and draped in the usual sterile manner. 1% lidocaine was injected for  local anesthesia. Ultrasound was used to evaluate and document patency  of the right basilic vein. Under sterile ultrasound guidance, access  into the right basilic vein was obtained. An image was saved for  documentation. An 8 Marshallese vascular sheath was placed. A right upper  extremity venogram was performed.    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    INTERVENTION: A KENNY 1 catheter was used to manipulate a guidewire  across the occluded right subclavian vein. Mechanical thrombectomy  using an 8 Marshallese AngioJet device was performed to remove thrombus  from the subclavian vein. Subsequently, balloon angioplasty of the  subclavian vein was performed with a 12 mm balloon. Followup venogram  showed improvement in luminal diameter and improved flow with  persistent nonocclusive thrombus within the mid right subclavian vein.  This was further intervened upon with the 8 Marshallese AngioJet device.  Subsequent venography showed  mild residual thrombus within the mid  right subclavian vein but overall good flow and washout of contrast  from the right subclavian vein. In addition, there was no significant  filling of previously seen collaterals in the right upper extremity  which had drained into the right internal jugular vein. This was felt  to be a satisfactory endpoint for the procedure.    The right arm sheath was removed. Pressure was held at the puncture  site for 10 minutes with good hemostasis.    I determined this patient to be an appropriate candidate for the  planned sedation and procedure and reassessed the patient immediately  prior to sedation and procedure. The patient tolerated the procedure  well. There were no immediate postprocedure complications. The  patient's vital signs were monitored by radiology nursing staff under  my supervision and remained stable throughout the study.     Medications: 3.5 mg Versed, 175 mcg fentanyl, heparin 4000 units,  Benadryl 50 mg    Sedation time: 40 minutes    Fluoroscopy time: 6.1 minutes    Total fluoroscopy dose: 32 mGy    Contrast: 45 mL Isovue      Impression    IMPRESSION: Post mechanical thrombectomy and balloon angioplasty of  the right subclavian vein, there is now satisfactory flow through the  right subclavian vein. Patient was restarted on anticoagulation with  intravenous heparin high-intensity protocol. Patient will likely  undergo repeat venography of the right subclavian vein in two weeks.    SILAS WATT MD   XR Chest Port 1 View    Narrative    CHEST PORTABLE ONE VIEW September 30, 2018 9:45 AM     HISTORY: Shortness of breath status post rib resection.    COMPARISON: Frontal chest x-ray 9/28/2018.      Impression    IMPRESSION: Postoperative changes consistent with resection of the  right first rib again noted. A surgical drain in place in the right  upper chest is again noted. There is a small amount of pleural fluid  at the apex of the right chest cavity. There is  no pneumothorax. There  is no pleural effusion on the left. Lungs are clear. Heart size is  normal.    GORGE OLIVAREZ MD   XR Chest 1 View    Narrative    CHEST ONE VIEW   9/30/2018 7:43 PM     HISTORY: EKG changes.    COMPARISON: X-ray from 9:40 AM this morning.      Impression    IMPRESSION: There are prominent changes in the right chest with near  complete opacification of the right hemithorax likely a combination of  pleural fluid and lung collapse or infiltrate. Left lung is clear.  Persistent subcutaneous emphysema in the right chest wall. There is a  tube overlying the apex of the right chest.    LAST SPICER MD   CT Chest w Contrast    Narrative    CT CHEST WITH CONTRAST   9/30/2018 9:10 PM     HISTORY: Hemothorax.     TECHNIQUE: 80 mL Isovue-370. Radiation dose for this scan was reduced  using automated exposure control, adjustment of the mA and/or kV  according to patient size, or iterative reconstruction technique.    COMPARISON: Chest CT from 9/16/2018.    FINDINGS: There is a chest tube in the apex of the right lung. There  is a large heterogeneous pleural effusion likely representing a  hemothorax. This results in near complete collapse of the right lung  with some aerated lung in the upper lobe anteriorly. There is  subcutaneous air within the chest wall. This causes some mild right to  left mediastinal shift. The left lung appears clear.      Impression    IMPRESSION:  1. Large amount of fluid in the right pleural space that has  heterogeneous density suggesting blood/hemothorax as suggested in the  patient's history. This causes near complete collapse of the right  lung with some small amounts of aerated lung in the anterior right  upper lobe.  2. There is a right-sided chest tube in place in the right chest apex.  3. There is mild right to left mediastinal shift.    LAST SPICER MD   XR Chest Port 1 View    Narrative    CHEST PORTABLE ONE VIEW   9/30/2018 10:05 PM     HISTORY: Status post right  chest tube placement.    COMPARISON: Chest x-ray from 1940 hours.      Impression    IMPRESSION: The smaller right apical chest tube has been removed and a  right chest tube is in place that extends just across the midline in  the mid chest. Persistent large pleural effusion or hemothorax.  Previous right first rib resection. No evidence of pneumothorax.  Moderate amount of subcutaneous air in the chest wall. No significant  change in right chest opacity.    LAST SPICER MD   XR Chest Port 1 View    Narrative    XR CHEST PORT 1 VW 10/1/2018 7:54 AM    COMPARISON: 9/30/2010    HISTORY: RIGHT chest tube placement.      Impression    IMPRESSION: Moderate RIGHT pleural effusion slightly decreased since  yesterday's study. Right-sided chest tube remains in place. No  pneumothorax seen on either side. LEFT lung is clear.    STEVE MILLAN MD   XR Chest Port 1 View    Narrative    CHEST PORTABLE ONE VIEW October 2, 2018 5:50 AM     HISTORY: Hemothorax.     COMPARISON: 10/1/2018.      Impression    IMPRESSION: Degree of opacification of the right chest are unchanged.  Medium sized right pleural effusion is unchanged. Possible trace  pleural gas component along the right lung apex. Right chest tube  positioning is unchanged. Left lung is clear. Heart size is unchanged.    LAUREEN MACARIO MD   XR Chest Port 1 View    Narrative    CHEST PORTABLE ONE VIEW   10/3/2018 6:40 AM     HISTORY: Follow-up right hemothorax.     COMPARISON: Chest x-ray 10/2/2018.      Impression    IMPRESSION: Portable view of the chest is performed. Right chest tube  is present with the tip terminating overlying the right hilar region.  Small residual hydropneumothorax is present. Some of the fluid has  been evacuated in the interval since prior exam. Left lung is well  expanded and clear. Heart is normal in size. No left pneumothorax or  pleural effusion.    AURELIO MARTINEZ MD   CTA Chest with Contrast    Narrative    CTA CHEST WITH CONTRAST   10/3/2018  2:53 PM     HISTORY: 27-year-old male status post surgical decompression of the  thoracic outlet due to thrombosis of the right subclavian vein.  Patient also underwent mechanical thrombectomy and balloon angioplasty  of the right subclavian vein. Following this procedure, the patient  developed a right hemothorax.    TECHNIQUE:  CT of the chest was performed following the administration  of 80mL Isovue-370. Radiation dose for this scan was reduced using  automated exposure control, adjustment of the mA and/or kV according  to patient size, or iterative reconstruction technique.    COMPARISON: Venogram dated 9/29/2018, CT of the chest dated 9/30/2018.    FINDINGS:  Since the previous CT, a large bore right-sided chest tube  has been placed. This courses into the right thorax in between the  lateral right fifth and sixth ribs. The tip of the chest tube is  located in the subcarinal region abutting the azygos vein. There has  been a significant decrease in blood/fluid in the right pleural space.  There are tiny pockets of pleural air in the right thorax. There is a  small amount of subcutaneous emphysema in the right chest wall and  axillary region. No active extravasation is identified. Atelectatic  changes are noted primarily in the right lower lobe. Previously seen  right axillary drain has been removed.    The contrast bolus was via the left side and therefore, the right  subclavian and axillary veins are not well opacified to evaluate for  patency.      Impression    IMPRESSION: Interval decrease in size of a right hemothorax post  placement of a large-bore right-sided chest tube. The medial end of  the chest tube extends into the subcarinal region.    SILAS WATT MD   XR Chest Port 1 View    Narrative    CHEST ONE VIEW PORTABLE       PROCEDURE DATE:  10/4/2018 5:30 AM     HISTORY:   follow up hemothorax;     COMPARISON:  Radiograph 10/3/2018, chest CT 10/3/2018.    FINDINGS/    Impression    IMPRESSION:    Right chest tube is present with the tip overlying the right hilar  region. Small residual right hydropneumothorax is not significantly  changed from prior. Left lung appears clear without pleural effusion.  Cardiomediastinal silhouette is within normal limits. Subcutaneous  emphysema within the right lateral chest wall.    JESSICA HIGH MD       RECENT LABS:   ECG:   ECHO:     Anesthesia Evaluation     . Pt has had prior anesthetic. Type: MAC and General    No history of anesthetic complications          ROS/MED HX    ENT/Pulmonary: Comment: Right hemothorax    (+)tobacco use, Current use , . .   (-) asthma, COPD, sleep apnea, GOPI risk factors, recent URI and allergic rhinitis   Neurologic:     (+)other neuro Thoracic Outlet Syndrome    Cardiovascular:  - neg cardiovascular ROS   (+) ----. : . . . :. . Previous cardiac testing date:results:date: results:ECG reviewed date:9/26/18 results:NSR date: results:          METS/Exercise Tolerance:  >4 METS   Hematologic:     (+) History of blood clots pt is anticoagulated, Anemia, -      Musculoskeletal:  - neg musculoskeletal ROS       GI/Hepatic:  - neg GI/hepatic ROS      (-) GERD   Renal/Genitourinary:  - ROS Renal section negative       Endo:  - neg endo ROS       Psychiatric:     (+) psychiatric history anxiety      Infectious Disease:  - neg infectious disease ROS       Malignancy:      - no malignancy   Other:    - neg other ROS                 Physical Exam  Normal systems: cardiovascular, pulmonary and dental    Airway   Mallampati: II  TM distance: >3 FB  Neck ROM: full    Dental   Comment: Recent wisdom tooth extraction that is healing/has continued to ooze while being on a heparin gtt    Cardiovascular       Pulmonary                         Anesthesia Plan      History & Physical Review  History and physical reviewed and following examination; no interval change.    ASA Status:  2 .    NPO Status:  > 8 hours    Plan for General and ETT with Intravenous and  Propofol induction. Maintenance will be Inhalation.    PONV prophylaxis:  Ondansetron (or other 5HT-3) and Dexamethasone or Solumedrol  Additional equipment: Double Lumen ETT and Fiberoptic bronchoscope 41mm [place]UST[place]      Postoperative Care  Postoperative pain management:  Multi-modal analgesia.      Consents  Anesthetic plan, risks, benefits and alternatives discussed with:  Patient and Parent (Mother and/or Father)..                          .

## 2018-10-05 ENCOUNTER — APPOINTMENT (OUTPATIENT)
Dept: GENERAL RADIOLOGY | Facility: CLINIC | Age: 28
DRG: 271 | End: 2018-10-05
Attending: PHYSICIAN ASSISTANT
Payer: COMMERCIAL

## 2018-10-05 LAB
ANION GAP SERPL CALCULATED.3IONS-SCNC: 8 MMOL/L (ref 3–14)
BUN SERPL-MCNC: 10 MG/DL (ref 7–30)
CALCIUM SERPL-MCNC: 8.7 MG/DL (ref 8.5–10.1)
CHLORIDE SERPL-SCNC: 100 MMOL/L (ref 94–109)
CO2 SERPL-SCNC: 29 MMOL/L (ref 20–32)
CREAT SERPL-MCNC: 0.75 MG/DL (ref 0.66–1.25)
GFR SERPL CREATININE-BSD FRML MDRD: >90 ML/MIN/1.7M2
GLUCOSE BLDC GLUCOMTR-MCNC: 119 MG/DL (ref 70–99)
GLUCOSE SERPL-MCNC: 103 MG/DL (ref 70–99)
HGB BLD-MCNC: 8.4 G/DL (ref 13.3–17.7)
POTASSIUM SERPL-SCNC: 4 MMOL/L (ref 3.4–5.3)
SODIUM SERPL-SCNC: 137 MMOL/L (ref 133–144)

## 2018-10-05 PROCEDURE — 71045 X-RAY EXAM CHEST 1 VIEW: CPT

## 2018-10-05 PROCEDURE — 25000125 ZZHC RX 250: Performed by: PHYSICIAN ASSISTANT

## 2018-10-05 PROCEDURE — 85018 HEMOGLOBIN: CPT | Performed by: PHYSICIAN ASSISTANT

## 2018-10-05 PROCEDURE — 25000132 ZZH RX MED GY IP 250 OP 250 PS 637: Performed by: STUDENT IN AN ORGANIZED HEALTH CARE EDUCATION/TRAINING PROGRAM

## 2018-10-05 PROCEDURE — 36415 COLL VENOUS BLD VENIPUNCTURE: CPT | Performed by: PHYSICIAN ASSISTANT

## 2018-10-05 PROCEDURE — 12000007 ZZH R&B INTERMEDIATE

## 2018-10-05 PROCEDURE — 80048 BASIC METABOLIC PNL TOTAL CA: CPT | Performed by: PHYSICIAN ASSISTANT

## 2018-10-05 PROCEDURE — 25000132 ZZH RX MED GY IP 250 OP 250 PS 637: Performed by: SURGERY

## 2018-10-05 PROCEDURE — 87040 BLOOD CULTURE FOR BACTERIA: CPT | Performed by: INTERNAL MEDICINE

## 2018-10-05 PROCEDURE — 99232 SBSQ HOSP IP/OBS MODERATE 35: CPT | Performed by: INTERNAL MEDICINE

## 2018-10-05 PROCEDURE — 25000128 H RX IP 250 OP 636: Performed by: SURGERY

## 2018-10-05 PROCEDURE — 25000132 ZZH RX MED GY IP 250 OP 250 PS 637: Performed by: PHYSICIAN ASSISTANT

## 2018-10-05 PROCEDURE — 00000146 ZZHCL STATISTIC GLUCOSE BY METER IP

## 2018-10-05 PROCEDURE — 25000132 ZZH RX MED GY IP 250 OP 250 PS 637: Performed by: INTERNAL MEDICINE

## 2018-10-05 RX ORDER — AMOXICILLIN 250 MG
1 CAPSULE ORAL 2 TIMES DAILY PRN
Status: DISCONTINUED | OUTPATIENT
Start: 2018-10-05 | End: 2018-10-06 | Stop reason: HOSPADM

## 2018-10-05 RX ORDER — GINSENG 100 MG
CAPSULE ORAL 3 TIMES DAILY
Status: DISCONTINUED | OUTPATIENT
Start: 2018-10-05 | End: 2018-10-06 | Stop reason: HOSPADM

## 2018-10-05 RX ORDER — AMOXICILLIN 250 MG
2 CAPSULE ORAL 2 TIMES DAILY
Status: DISCONTINUED | OUTPATIENT
Start: 2018-10-05 | End: 2018-10-06 | Stop reason: HOSPADM

## 2018-10-05 RX ORDER — DIPHENHYDRAMINE HCL 25 MG
25 CAPSULE ORAL EVERY 6 HOURS PRN
Status: DISCONTINUED | OUTPATIENT
Start: 2018-10-05 | End: 2018-10-06 | Stop reason: HOSPADM

## 2018-10-05 RX ORDER — NALOXONE HYDROCHLORIDE 0.4 MG/ML
.1-.4 INJECTION, SOLUTION INTRAMUSCULAR; INTRAVENOUS; SUBCUTANEOUS
Status: DISCONTINUED | OUTPATIENT
Start: 2018-10-05 | End: 2018-10-05

## 2018-10-05 RX ORDER — ONDANSETRON 2 MG/ML
4 INJECTION INTRAMUSCULAR; INTRAVENOUS EVERY 6 HOURS PRN
Status: DISCONTINUED | OUTPATIENT
Start: 2018-10-05 | End: 2018-10-06 | Stop reason: HOSPADM

## 2018-10-05 RX ORDER — PROCHLORPERAZINE MALEATE 10 MG
10 TABLET ORAL EVERY 6 HOURS PRN
Status: DISCONTINUED | OUTPATIENT
Start: 2018-10-05 | End: 2018-10-06 | Stop reason: HOSPADM

## 2018-10-05 RX ORDER — AMOXICILLIN 250 MG
2 CAPSULE ORAL 2 TIMES DAILY PRN
Status: DISCONTINUED | OUTPATIENT
Start: 2018-10-05 | End: 2018-10-06 | Stop reason: HOSPADM

## 2018-10-05 RX ORDER — POLYETHYLENE GLYCOL 3350 17 G/17G
17 POWDER, FOR SOLUTION ORAL DAILY PRN
Status: DISCONTINUED | OUTPATIENT
Start: 2018-10-05 | End: 2018-10-06 | Stop reason: HOSPADM

## 2018-10-05 RX ORDER — DIPHENHYDRAMINE HYDROCHLORIDE 50 MG/ML
25 INJECTION INTRAMUSCULAR; INTRAVENOUS EVERY 6 HOURS PRN
Status: DISCONTINUED | OUTPATIENT
Start: 2018-10-05 | End: 2018-10-06 | Stop reason: HOSPADM

## 2018-10-05 RX ORDER — CALCIUM CARBONATE 500 MG/1
1000 TABLET, CHEWABLE ORAL 4 TIMES DAILY PRN
Status: DISCONTINUED | OUTPATIENT
Start: 2018-10-05 | End: 2018-10-06 | Stop reason: HOSPADM

## 2018-10-05 RX ORDER — AMOXICILLIN 250 MG
1 CAPSULE ORAL 2 TIMES DAILY
Status: DISCONTINUED | OUTPATIENT
Start: 2018-10-05 | End: 2018-10-06 | Stop reason: HOSPADM

## 2018-10-05 RX ORDER — LIDOCAINE 40 MG/G
CREAM TOPICAL
Status: DISCONTINUED | OUTPATIENT
Start: 2018-10-05 | End: 2018-10-06 | Stop reason: HOSPADM

## 2018-10-05 RX ORDER — SODIUM CHLORIDE 9 MG/ML
INJECTION, SOLUTION INTRAVENOUS CONTINUOUS
Status: DISCONTINUED | OUTPATIENT
Start: 2018-10-05 | End: 2018-10-05

## 2018-10-05 RX ORDER — ONDANSETRON 4 MG/1
4 TABLET, ORALLY DISINTEGRATING ORAL EVERY 6 HOURS PRN
Status: DISCONTINUED | OUTPATIENT
Start: 2018-10-05 | End: 2018-10-06 | Stop reason: HOSPADM

## 2018-10-05 RX ORDER — NALOXONE HYDROCHLORIDE 0.4 MG/ML
.1-.4 INJECTION, SOLUTION INTRAMUSCULAR; INTRAVENOUS; SUBCUTANEOUS
Status: DISCONTINUED | OUTPATIENT
Start: 2018-10-05 | End: 2018-10-06 | Stop reason: HOSPADM

## 2018-10-05 RX ADMIN — SENNOSIDES AND DOCUSATE SODIUM 1 TABLET: 8.6; 5 TABLET ORAL at 09:02

## 2018-10-05 RX ADMIN — METHOCARBAMOL 750 MG: 750 TABLET ORAL at 12:24

## 2018-10-05 RX ADMIN — SENNOSIDES AND DOCUSATE SODIUM 2 TABLET: 8.6; 5 TABLET ORAL at 22:09

## 2018-10-05 RX ADMIN — IBUPROFEN 600 MG: 600 TABLET, FILM COATED ORAL at 11:26

## 2018-10-05 RX ADMIN — METHOCARBAMOL 750 MG: 750 TABLET ORAL at 22:09

## 2018-10-05 RX ADMIN — IBUPROFEN 600 MG: 600 TABLET, FILM COATED ORAL at 23:51

## 2018-10-05 RX ADMIN — OXYCODONE HYDROCHLORIDE 10 MG: 5 TABLET ORAL at 18:00

## 2018-10-05 RX ADMIN — Medication: at 13:26

## 2018-10-05 RX ADMIN — RANITIDINE 150 MG: 150 TABLET ORAL at 18:00

## 2018-10-05 RX ADMIN — IBUPROFEN 600 MG: 600 TABLET, FILM COATED ORAL at 03:48

## 2018-10-05 RX ADMIN — BACITRACIN: 500 OINTMENT TOPICAL at 13:42

## 2018-10-05 RX ADMIN — FERROUS SULFATE TAB 325 MG (65 MG ELEMENTAL FE) 325 MG: 325 (65 FE) TAB at 09:02

## 2018-10-05 RX ADMIN — OXYCODONE HYDROCHLORIDE 10 MG: 5 TABLET ORAL at 13:27

## 2018-10-05 RX ADMIN — METHOCARBAMOL 750 MG: 750 TABLET ORAL at 03:48

## 2018-10-05 RX ADMIN — OXYCODONE HYDROCHLORIDE 10 MG: 5 TABLET ORAL at 22:09

## 2018-10-05 RX ADMIN — FERROUS SULFATE TAB 325 MG (65 MG ELEMENTAL FE) 325 MG: 325 (65 FE) TAB at 22:09

## 2018-10-05 ASSESSMENT — ACTIVITIES OF DAILY LIVING (ADL)
ADLS_ACUITY_SCORE: 11

## 2018-10-05 NOTE — PROGRESS NOTES
X-cover     Pt s/p R VATS, drainage of hemothorax and removal of intrapleural clots. With fever in the post op. Per nursing in PACU, Dr. Perrin doesn't feel fevers related to surgery. Hemodynamically stable. Will send cx x 2 sites, acetaminophen, hold on abx.     Fredy Hare M.D.  Hospitalist  Pager [phone]  Text Page until 6 pm (after call answering service)

## 2018-10-05 NOTE — PLAN OF CARE
Problem: Patient Care Overview  Goal: Plan of Care/Patient Progress Review  Outcome: Improving  Pt arrived back to floor at 2044 from PACU, A&O x4. SBA. Reg diet, voiding adequately. Chest tube (x2) dressings CDI. LS diminished on R side. PCA at 0.3. Pt went down to surgery at 1330.  Pt c/o lower back muscle pain, robaxin and ibuprofen given for slight relief along with ice packs,chest xray this am

## 2018-10-05 NOTE — PLAN OF CARE
Problem: Patient Care Overview  Goal: Individualization & Mutuality  PCA dcd. Oxycodone started. Taking robaxin and ibuprofen. Ambulates well and often in cordero. CT X2 patent, no air leak or crepitus noted. Kathryn Starkey RN

## 2018-10-05 NOTE — OR NURSING
Notified Nasima Jorgensen regarding CT drainage of 100cc in first hour and temp of 101.7. Hospitalist & MDA also notified of fever. Tylenol PO given in PACU.  Bladder scanned patient for > 999 ml, prior to leaving PACU. The patient expressed that he would like to go to the room and go to the restroom.  Patient transferred to [facility]. Patient up with assist X1 to restroom, gait steady. Denies dizziness. Due to the urgency, unable to measure the urine.

## 2018-10-05 NOTE — PROGRESS NOTES
"CLINICAL NUTRITION SERVICES - REASSESSMENT NOTE      Malnutrition (10/1):   % Weight Loss: Unable to determine d/t accuracy of weight trending   % Intake:  </= 50% for >/= 5 days (severe malnutrition)  Subcutaneous Fat Loss:  None observed  Muscle Loss:  None observed  Fluid Retention:  None noted     Malnutrition Diagnosis: Unable to determine due to questionable accuracy of weight trending        EVALUATION OF PROGRESS TOWARD GOALS   Diet: Regular diet, Boost shakes TID (10 am, 2 pm, with dinner)    Intake:   Visited with patient today. States that although being in the hospital is \"a pretty stressful thing\" he now sees a light at the end of the tunnel, and feels an appetite coming back.     Patient consumed 100% of a hamburger last night. Noted, patient has many snacks in room and at bedside. States that he drinks all the Boost supplements that arrive to his room, tolerating them well.     Stooling not documented      Chest tube x 2    NEW FINDINGS:   10/4: Procedure - Right VATS w/ removal of residual clots   10/4: Txr out of ICU to floor    Previous Goals (10/1):   Patient to consume 50% of at least 1 meal, and at least 2 supplements daily   Evaluation: Met    Previous Nutrition Diagnosis (10/1):   Inadequate oral intake related to decreased appetite as evidenced by <50% intake over the past 5+ days  Evaluation: No change      CURRENT NUTRITION DIAGNOSIS  Inadequate oral intake related to decreased appetite as evidenced by <75% intake over the past 7 days    INTERVENTIONS  Recommendations / Nutrition Prescription  Regular diet as tolerated, encouraging PO intake    Continue multiple high-protein shakes daily    Implementation  None at this time    Goals  Patient to consume >75% of 2 meals and 2 supplements daily      MONITORING AND EVALUATION:  Progress towards goals will be monitored and evaluated per protocol and Practice Guidelines      Jerilyn Tomas, RD, LD  Clinical Dietitian     "

## 2018-10-05 NOTE — PROGRESS NOTES
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  Right transaxillary first rib resection.   a.  Anterior and middle scalenectomy.   b.  Mobilization of right subclavian vein on 9/28/18    S/p Rt CT tube placed for Hemothorax 9/30/18    S/p Rt VATS 10/4/18    POD 7/5/1  Assessment:   -S/P lysis / mechanical thrombectomy 9/26 - 9/27  -first rib resection 9/28, was on therapeutic AC post-op with IV UFH  -Patient with chest pain, shortness of breath, tachycardia 9/30. Initial CXR with no signs of pneumothorax/hemothorax, only evidence of pleural tear at the upper right lung zone. Follow-up emergent CT later in evening with large hemothorax. Chest tube placed.  -Repeat CTA on 10/3/18 with significant residual clots in right lung.   -S/p VATS on 10/4/18  -HGB stable at 8.4. Transfused 3 units PRBC on 10/1   -Pain under control. Looking and breathing better today.    -Moon out, voiding (had trouble initially with urinary retention post-op)    Plan:   -Post-op cares per Dr. Morelos and Dr. Madden  -To resume Xarelto when chest tubes out.   -Pain control  -Follow up in Vascular Clinic upon discharge.                Interval History:   doing well;  cp, sob, n/v/d, or abd pain. Some surgical incision pain. Feeling ok. Chest tubes in place. Hgb stable.              Review of Systems:   The 10 point Review of Systems is negative other than noted in the HPI             Medications:       bacitracin   Topical TID     ranitidine  150 mg Oral Q12H    Or     famotidine  20 mg Intravenous Q12H     ferrous sulfate  325 mg Oral BID     senna-docusate  1 tablet Oral BID    Or     senna-docusate  2 tablet Oral BID     sodium chloride (PF)  3 mL Intracatheter Q8H                  Physical Exam:     Patient Vitals for the past 24 hrs:   BP Temp Temp  src Heart Rate Resp SpO2   10/05/18 0911 - - - - 16 -   10/05/18 0906 - - - - 16 -   10/05/18 0735 118/75 98  F (36.7  C) Oral 91 16 96 %   10/05/18 0348 131/85 97.7  F (36.5  C) Oral 103 - 99 %   10/05/18 0000 104/66 98  F (36.7  C) - 107 - 94 %   10/04/18 2300 112/67 98  F (36.7  C) - 109 - 94 %   10/04/18 2158 120/79 99  F (37.2  C) Axillary 105 15 96 %   10/04/18 2120 123/76 98.1  F (36.7  C) Axillary 105 24 97 %   10/04/18 2044 142/81 99.6  F (37.6  C) Axillary 105 - 96 %   10/04/18 2026 - 101.7  F (38.7  C) - - - -   10/04/18 2020 123/70 101.7  F (38.7  C) - 107 16 96 %   10/04/18 2010 129/68 101.7  F (38.7  C) - 117 17 96 %   10/04/18 2000 128/75 101.3  F (38.5  C) Temporal 104 15 96 %   10/04/18 1950 130/71 101.1  F (38.4  C) - 101 15 97 %   10/04/18 1940 125/72 101.1  F (38.4  C) - 101 14 98 %   10/04/18 1930 132/68 101.1  F (38.4  C) Temporal 99 15 97 %   10/04/18 1920 130/73 100.9  F (38.3  C) - 93 17 97 %   10/04/18 1910 130/66 100.9  F (38.3  C) - 93 29 97 %   10/04/18 1900 133/76 100.9  F (38.3  C) - 97 15 98 %   10/04/18 1850 140/80 - - 101 15 95 %   10/04/18 1840 143/86 - - 105 16 95 %   10/04/18 1830 (!) 156/93 100.8  F (38.2  C) - 100 12 100 %   10/04/18 1825 - 100.6  F (38.1  C) - 105 29 100 %   10/04/18 1820 (!) 164/100 100.4  F (38  C) - 112 25 100 %   10/04/18 1810 (!) 171/99 99.1  F (37.3  C) - 124 25 96 %   10/04/18 1807 (!) 164/106 99.1  F (37.3  C) Temporal 122 20 98 %   10/04/18 1404 - - - - 18 -   10/04/18 1143 120/67 98  F (36.7  C) Oral 89 18 97 %     Wt Readings from Last 4 Encounters:   10/01/18 69.2 kg (152 lb 8.9 oz)   09/26/18 78.4 kg (172 lb 12.8 oz)   09/21/18 76.8 kg (169 lb 4.8 oz)   09/16/18 76.7 kg (169 lb)     Constitutional: normal  Eyes: normal  ENT: normal  Neck: Supple, symmetrical  Lungs: No increased work of breathing, BS on right better, 2 chest tubes in place with bloody output.   Cardiovascular: Regular rate and rhythm, normal S1 and S2, no S3 or S4, and no murmur  noted.  Abdomen: normal  Musculoskeletal: No redness, warmth, or swelling of the joints.  Full range of motion noted.  Motor strength is 5 out of 5 all extremities bilaterally.  Tone is normal.  Neurologic: Awake, alert, oriented to name, place and time.  Cranial nerves II-XII are grossly intact.  Motor is 5 out of 5 bilaterally.    Neuropsychiatric: normal  Skin: Surgical site dressed.            Data:     Results for orders placed or performed during the hospital encounter of 09/26/18 (from the past 24 hour(s))   XR Chest Port 1 View    Narrative    XR PORTABLE CHEST ONE VIEW   10/4/2018 6:35 PM     INDICATION: Status post right VATS, drainage of hemothorax.    COMPARISON: 10/4/2018.      Impression    IMPRESSION: There are now 2 right chest tubes in place.  Again noted  is a tiny right hydropneumothorax. Interval decreased pleural fluid in  the mid chest along the fissure. Minimal atelectasis in the right  lower lung. Left lung remains clear. Normal heart size.    SONAM JJ MD   Glucose by meter   Result Value Ref Range    Glucose 119 (H) 70 - 99 mg/dL   Basic metabolic panel   Result Value Ref Range    Sodium 137 133 - 144 mmol/L    Potassium 4.0 3.4 - 5.3 mmol/L    Chloride 100 94 - 109 mmol/L    Carbon Dioxide 29 20 - 32 mmol/L    Anion Gap 8 3 - 14 mmol/L    Glucose 103 (H) 70 - 99 mg/dL    Urea Nitrogen 10 7 - 30 mg/dL    Creatinine 0.75 0.66 - 1.25 mg/dL    GFR Estimate >90 >60 mL/min/1.7m2    GFR Estimate If Black >90 >60 mL/min/1.7m2    Calcium 8.7 8.5 - 10.1 mg/dL   Hemoglobin   Result Value Ref Range    Hemoglobin 8.4 (L) 13.3 - 17.7 g/dL

## 2018-10-05 NOTE — PROGRESS NOTES
THORACIC SURGERY POD # 1    Doing well  AVSS on RA  No bleeding  No air leak  r pleural space well drained    Continue CT suction today  Most likely discharge CT in am  Wait until CT out to restart anticoagulation if at ll possible    Discussed    MANUELA ESTRADA MD [facility] ONCOLOGY THORACIC SURGERY  CELL:  [phone]  OFFICE: [phone]

## 2018-10-05 NOTE — PROGRESS NOTES
Vascular Surgery Progress Note:  POD#1  VATS    S: Sore but overall OK.  No SOB.    O:   Vitals:  BP  Min: 104/66  Max: 171/99  Temp  Av  F (37.8  C)  Min: 97.7  F (36.5  C)  Max: 101.7  F (38.7  C)  I/O last 3 completed shifts:  In: 2749 [I.V.:2249]  Out: 258 [Blood:10; Chest Tube:248]    Physical Exam: Alert   Chest=clear                             CT=60 ml /last shift serous.      Hgb=8.4      AM CXR= clear  Much better.  Stable extrapleural apical cap.      Assessment/Plan: Doing well.  Will restart  Xarelto in AM after CT out.  Home on Xarelto when CT out.      Wm. Maryanne MD

## 2018-10-05 NOTE — OP NOTE
Procedure Date: 10/04/2018      DATE OF OPERATION:  10/04/2018        SURGEON:  Sean Morelos MD      FIRST ASSISTANT:  Nasima Jorgensen PA-C      PREOPERATIVE DIAGNOSIS:  Right hemothorax.        POSTOPERATIVE DIAGNOSIS:  Right hemothorax.        PROCEDURES:  Right video-assisted thoracoscopy, drainage of hemothorax, and removal of intrapleural fibrin and clots.      ANESTHESIA:  General, with double lumen endotracheal tube.      INDICATIONS:  A 27-year-old gentleman who underwent a  resection of the first rib for venous thrombosis.  Was anticoagulated.  Developed a hemothorax.  This was treated with a chest tube.  A CT scan shows a residual large amount of clots in the pleural cavity. Pt condition is stable.  There is no evidence of ongoing bleeding.  Based on the findings, a video assisted thoracoscopy  is indicated for drainage.      DESCRIPTION OF PROCEDURE:  The patient was brought tot he operating romm  The patient was placed in the lateral decubitus position.  The chest tube was removed.  The right chest was prepared with brushings and ChloraPrep.   Left chest was prepared and draped in the usual fashion.   The video thoracoscope was introduced.  Upon examination, there is a large amount of clots and bloody fluid in the chest cavity.  The anterior incision was slightly enlarged.  Then, all the fluid and clots in the pleural cavity were removed, leaving a small cap at the apex of the right pleural cavity.  At the completion of the procedure, the pleural space cavity clean.        Reexpansion of the three lobes was normal. Hemostasis was excellent.  There was no evidence of ongoing bleeding. Thirty mL of Marcaine 0.5% without epinephrine was injected as intercostal blocks .  Estimated blood loss minimal and needle and sponge count is correct.   2 chest tubes were placed     Nasima Jorgensen PA-C, was the first assistant during the procedure..  Her role as first assistant was essential and necessary in  accomplishing the steps of the procedure as described above, providing exposure, retraction, and handling of the scope.         MANUELA ESTRADA MD             D: 10/05/2018   T: 10/05/2018   MT: SCOTT      Name:     ERLIN LIN   MRN:      [id number]        Account:        [id number]   :      1990           Procedure Date: 10/04/2018      Document: [id number]

## 2018-10-05 NOTE — PROGRESS NOTES
HOSPITALIST CONSULT CHART CHECK:     Hospitalist service was consulted for cross coverage only. We will peripherally follow and chart check throughout the week.       - For vascular medical concerns during business hours M-F, call the [facility] at [phone] to have the rounding/on call Vascular Medicine (NOT VASCULAR SURGERY) MD paged.     - After business hours M-F, for medical concerns on this patient, please page hospitalist staff.     - For vascular surgical questions, please page the appropriate surgeon (primary vascular surgeon or on call vascular surgeon) based upon the time of day.     VANNESSA Torres, CNP  Hospitalist Service, House Officer  [facility]     Text Page  Pager: [phone]

## 2018-10-05 NOTE — ANESTHESIA POSTPROCEDURE EVALUATION
Patient: Rickie Lagos    Procedure(s):  RIGHT VIDEO ASSISTED THORACOSCOPIC SURGERY, DRAINAGE OF HEMOTHORAX, REMOVAL OF INTRAPLEURAL CLOTS - Wound Class: I-Clean    Diagnosis:RIGHT HEMOTHORAX  Diagnosis Additional Information: No value filed.    Anesthesia Type:  General, ETT    Note:  Anesthesia Post Evaluation    Patient location during evaluation: PACU  Patient participation: Able to fully participate in evaluation  Level of consciousness: sleepy but conscious and responsive to verbal stimuli  Pain management: adequate  Airway patency: patent  Cardiovascular status: acceptable and hemodynamically stable  Respiratory status: acceptable and unassisted  Hydration status: acceptable  PONV: none     Anesthetic complications: None          Last vitals:  Vitals:    10/04/18 1820 10/04/18 1825 10/04/18 1830   BP: (!) 164/100  (!) 156/93   Pulse:      Resp: 25 29 12   Temp: 38  C (100.4  F) 38.1  C (100.6  F) 38.2  C (100.8  F)   SpO2: 100% 100% 100%         Electronically Signed By: James Watters MD  October 4, 2018  7:30 PM

## 2018-10-06 ENCOUNTER — APPOINTMENT (OUTPATIENT)
Dept: GENERAL RADIOLOGY | Facility: CLINIC | Age: 28
DRG: 271 | End: 2018-10-06
Attending: PHYSICIAN ASSISTANT
Payer: COMMERCIAL

## 2018-10-06 VITALS
TEMPERATURE: 98.2 F | BODY MASS INDEX: 18.58 KG/M2 | SYSTOLIC BLOOD PRESSURE: 118 MMHG | DIASTOLIC BLOOD PRESSURE: 70 MMHG | HEART RATE: 87 BPM | WEIGHT: 152.56 LBS | HEIGHT: 76 IN | OXYGEN SATURATION: 97 % | RESPIRATION RATE: 16 BRPM

## 2018-10-06 LAB — PLATELET # BLD AUTO: 430 10E9/L (ref 150–450)

## 2018-10-06 PROCEDURE — 25000132 ZZH RX MED GY IP 250 OP 250 PS 637: Performed by: PHYSICIAN ASSISTANT

## 2018-10-06 PROCEDURE — 71045 X-RAY EXAM CHEST 1 VIEW: CPT

## 2018-10-06 PROCEDURE — 25000132 ZZH RX MED GY IP 250 OP 250 PS 637: Performed by: INTERNAL MEDICINE

## 2018-10-06 PROCEDURE — 36415 COLL VENOUS BLD VENIPUNCTURE: CPT | Performed by: PHYSICIAN ASSISTANT

## 2018-10-06 PROCEDURE — 85049 AUTOMATED PLATELET COUNT: CPT | Performed by: PHYSICIAN ASSISTANT

## 2018-10-06 PROCEDURE — 25000132 ZZH RX MED GY IP 250 OP 250 PS 637: Performed by: SURGERY

## 2018-10-06 RX ORDER — FERROUS SULFATE 325(65) MG
325 TABLET ORAL 2 TIMES DAILY
Qty: 100 TABLET | Refills: 0 | Status: SHIPPED | OUTPATIENT
Start: 2018-10-06 | End: 2018-12-20

## 2018-10-06 RX ORDER — METHOCARBAMOL 750 MG/1
750 TABLET, FILM COATED ORAL 4 TIMES DAILY PRN
Qty: 12 TABLET | Refills: 0 | Status: SHIPPED | OUTPATIENT
Start: 2018-10-06 | End: 2018-11-13

## 2018-10-06 RX ORDER — OXYCODONE HYDROCHLORIDE 5 MG/1
5-10 TABLET ORAL EVERY 4 HOURS PRN
Qty: 30 TABLET | Refills: 0 | Status: SHIPPED | OUTPATIENT
Start: 2018-10-06 | End: 2018-11-13

## 2018-10-06 RX ADMIN — OXYCODONE HYDROCHLORIDE 10 MG: 5 TABLET ORAL at 02:06

## 2018-10-06 RX ADMIN — OXYCODONE HYDROCHLORIDE 10 MG: 5 TABLET ORAL at 06:22

## 2018-10-06 RX ADMIN — SENNOSIDES AND DOCUSATE SODIUM 2 TABLET: 8.6; 5 TABLET ORAL at 09:05

## 2018-10-06 RX ADMIN — FERROUS SULFATE TAB 325 MG (65 MG ELEMENTAL FE) 325 MG: 325 (65 FE) TAB at 09:05

## 2018-10-06 RX ADMIN — RANITIDINE 150 MG: 150 TABLET ORAL at 06:22

## 2018-10-06 RX ADMIN — IBUPROFEN 600 MG: 600 TABLET, FILM COATED ORAL at 07:50

## 2018-10-06 ASSESSMENT — ACTIVITIES OF DAILY LIVING (ADL)
ADLS_ACUITY_SCORE: 11

## 2018-10-06 NOTE — PROGRESS NOTES
Vascular Surgery Progress Note    S: Very comfortable.  Pain well controlled.  No shortness of breath.    O:   Vitals:  BP  Min: 118/76  Max: 122/80  Temp  Av.9  F (36.6  C)  Min: 97.8  F (36.6  C)  Max: 97.9  F (36.6  C)  Pulse  Av  Min: 96  Max: 96  I/O last 3 completed shifts:  In: 1923 [P.O.:1710; I.V.:213]  Out: 147 [Urine:1; Chest Tube:146]    Physical Exam: Chest= clear                             Chest tube=146 ml/24 hrs total     postop hemoglobin= 8.4    A.M. chest x-ray= clear.  No obvious residual pneumothorax except for stable right apical Extrapleural.  Dr. Morelos did not deal with this area and noted upper lobe adherent to the pleural tear that occurred at the time of surgery.            Assessment/Plan: Possible chest mild this morning pending Dr. Morelos evaluation.                                Home on Xarelto                                  Plan repeat venogram 2-3 weeks with Dr. Barajas                                 Home on [place] iron Wm. Omlie, MD

## 2018-10-06 NOTE — DISCHARGE INSTRUCTIONS
Discharge Instructions following   Thoracic Outlet Syndrome Surgery  [facility]    Incision Care    You will have an incision under your arm, approximately 3 inches in length.  There will be white strips of tape, called steri-strips, applied over the incision.  The ends will loosen after 5-7 days, at which time they can be removed.    Cover your incision with dry gauze and change as needed.    You may shower 2-3 days after your surgery - pat the incision dry to prevent irritation from moisture.      You may develop bruising and firmness near your incision.  This will soften and resolve over the next 1-3 weeks.  Call our office if it enlarges, becomes red, or painful.    On occasion a soft, fluid-filled bulge can develop near a surgical incision - this is called a seroma.  It will likely resolve on its own, but occasionally requires your doctor to drain it in clinic.  You should call our office if you have questions about this.    You may notice numbness around your incision.  This is due to nerve irritation from the surgery and will gradually improve over the next several weeks.    Diet    You may resume a regular diet before you leave the hospital.  You may find that it is best to try smaller, more frequent meals until your appetite returns.    Activity the first 1-2 weeks after surgery    You may drive a car 3-4 days after surgery and when you are off prescription pain pills.    You may return to work or school once you feel ready - typically within 1 week.    You should avoid strenuous activity, including running, biking, or weight-lifting more than 20 pounds.  You will be instructed on increasing your activity level at your post-operative appointment    You should use your arm for your normal routine after surgery - ie opening doors, using the computer, styling your hair etc.    When to call our office: [phone]    If you experience fever greater than 101    Redness  and /or drainage from your incision    Shortness of breath    Any concerns regarding your recovery     Post-operative appointment :  Date: ___________________________  Time: __________________________  Location: [facility], [address] [place] [address], [address]    Revised 5/2014

## 2018-10-06 NOTE — PROGRESS NOTES
THORACIC SURGERY POD # 2    Doing well  AVSS on RA  Minimal serous CT output    CXR r lung expanded, pleural space well drained    CT out    OK to discharge from my standpoint    F/U CXR with Dr. Madden  I will see him PRN    MANUELA ESTRADA MD [facility] ONCOLOGY THORACIC SURGERY  CELL:  [phone]  OFFICE: [phone]

## 2018-10-06 NOTE — PLAN OF CARE
Problem: Patient Care Overview  Goal: Plan of Care/Patient Progress Review  Outcome: Improving  VS within normal limits. A/Ox4. Chest tube (x2) dressings CDI, no AL, no crepitus. Pain controlled with prn Oxy and robaxin. Up with SBA. Tolerating reg diet. +gas, -bm. Voiding to toilette. LS diminished on right side. IS to 1000. chest xray tomorrow morning.

## 2018-10-06 NOTE — PLAN OF CARE
Problem: Patient Care Overview  Goal: Plan of Care/Patient Progress Review  Outcome: Improving  Pt A&O x4. VSS on RA. IV SL. Chest tubes patent, expected to be removed today. Pain well controlled with Oxycodone, Robaxin, and Ibuprofen.  Pt ambulating well to bathroom and having good output. IS to 1250, encouraged use. Pt tolerating regular diet and reported small BM this evening. Discharge pending.

## 2018-10-06 NOTE — PLAN OF CARE
Problem: Patient Care Overview  Goal: Plan of Care/Patient Progress Review  Outcome: Completed Date Met: 10/06/18  VSS, pt dc'd in stable condition. discontinue instructions and education provided. Questions answered. Pt verbalized understanding of discontinue instructions.

## 2018-10-07 NOTE — DISCHARGE SUMMARY
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  He underwent successful lytic therapy with balloon angioplasty.  He was discharged to home on Xarelto.  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  The patient was therefore admitted for further treatment.      HOSPITAL COURSE:  The patient was admitted on 9/26/18.  Venogram was performed which confirmed re-thrombosis of the axillary and subclavian vein.  He underwent successful lytic therapy and balloon angioplasty.  With the recurrent thrombosis on appropriate anticoagulation, we felt that the extrinsic compression from the vasogenic thoracic outlet syndrome was significant enough to warrant more aggressive treatment.      The patient was kept on intravenous heparin following the lytic therapy.  He was taken to the operating room on 9/28/2018, where he underwent a right transaxillary first rib resection with anterior and middle scalenectomy and extensive mobilization of the right subclavian vein to make sure there was no residual extrinsic compression.  A small pleural tear occurred.  He tolerated this procedure quite well.  We suspected that his subclavian vein actually re-thrombosed prior to the surgery despite the heparin.  A ALICIA drain was left which had very minimal output by the following morning.     We kept him off his heparin immediately postoperatively.  A repeat right arm venogram was performed by Dr. Barajas on 9/29/2018.  This confirmed reocclusion of the axillary and subclavian vein.  With a mechanical Possis thrombectomy and angioplasty, the vein opened up with an excellent clinical result and no extrinsic compression. Sheaths were removed  and patient was started on heparin.      Very minimal Gavin-Zapata drain output was noted post-procedure and following the venogram.  The patient was restarted on full-dose heparin.  On 9/30/18 morning, he was complaining of increasing pleuritic chest pain.  Chest x-ray revealed an apical cap of thrombus, which we expected, but no hemopneumothorax.  The patient was monitored.  However, in the early evening the pain markedly worsened, and he had evidence of respiratory distress and tachycardia.  Chest x-ray revealed a large right hemothorax.  We felt that most likely this was related to a disruption of the subclavian vein from the venoplasty, and with the anticoagulation and pleural tear, this allowed bleeding to enter the right hemithorax.  The patient was transferred to ICU.  Chest tube was placed with return of approximately 1800 mL of blood.  The patient's thus clinically improved with fluid resuscitation.  He was quite anemic following this and had evidence of ongoing tachycardia.  Hemoglobin decreased from 12.4 preoperatively to 6.7.  He received a total of 3 units packed red blood cells to replace the blood from the chest tubes due to acute blood loss anemia.  Hemoglobin stabilized at 8.4 at the time of discharge with no further transfusions.      Chest x-rays were markedly improved after placement of the chest tube.  However, there appeared to be loculated blood in the lower portion of the chest around the diaphragm.  I therefore asked Dr. Sean Morelos to perform videoscopic thoracoscopy for drainage of the clotted blood.  This was performed on 10/4/2018 with no complications.  Two chest tubes were left.      The patient did well following this.  Hemoglobin remained stable.  Very minimal post-procedure chest tube output was noted.  Chest x-rays remained clear.  Chest tubes were removed on 10/6/2018.      We felt it was appropriate to restart his Xarelto at 15 mg p.o. b.i.d. at this time and discharge the  patient home.      DISCHARGE MEDICATIONS:   1.  Xarelto 15 mg p.o. b.i.d.   2.  Iron sulfate 325 mg p.o. b.i.d.   3.  Norco p.r.n.      FINAL DIAGNOSES:   1.  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   a.  Venogram with lytic therapy and venoplasty.   b.  Right transaxillary first rib resection and scalenectomy.   c.  Repeat right arm venogram with mechanical thrombectomy and venoplasty.   2.  Post-venoplasty right hemothorax with hypotension and tachycardia.   a.  Placement of right chest tube.   b.  Blood transfusion.   3.  Video-assisted thoracoscopy for drainage of clotted right pleural cavity blood.      PLAN:  The patient will be discharged home on oral iron.  He has resumed his Xarelto and will continue with this.  We will see him in the office in 1 week.  We plan a repeat venogram on the right side in approximately 2 weeks to see if the vein has remained opened, and if not, further treatment will be performed.  This has been discussed at length with the patient and his parents.         FRITZ EMMANUEL MD             D: 10/06/2018   T: 10/06/2018   MT: DESTINI      Name:     ERLIN LIN   MRN:      [id number]        Account:        [id number]   :      1990           Admit Date:     2018                                  Discharge Date: 10/06/2018      Document: [id number]       cc: Erlin Ruiz MD

## 2018-10-08 ENCOUNTER — TELEPHONE (OUTPATIENT)
Dept: OTHER | Facility: CLINIC | Age: 28
End: 2018-10-08

## 2018-10-08 NOTE — TELEPHONE ENCOUNTER
[place] [facility]    I called Rickie Reji Yolis to see how he was doing.  He had a complicated vasogenic right thoracic outlet problem with a postoperative right hemothorax following repeat venoplasty of the reoccluded subclavian vein.      VATS was performed to remove all right intrapleural clot.  Chest tubes were removed and he was discharged on 10-6-18.    He reports that his breathing is been good.  Still somewhat sore as expected.  Numbness adjacent to the axillary incision which is expected and we discussed this.  He is coming back to see me in the office later this week and will discuss timing of the repeat venogram.      Kong Madden MD

## 2018-10-09 ENCOUNTER — NURSE TRIAGE (OUTPATIENT)
Dept: NURSING | Facility: CLINIC | Age: 28
End: 2018-10-09

## 2018-10-09 ENCOUNTER — TELEPHONE (OUTPATIENT)
Dept: OTHER | Facility: CLINIC | Age: 28
End: 2018-10-09

## 2018-10-09 NOTE — TELEPHONE ENCOUNTER
Reason for Disposition    [1] Pus or bad-smelling fluid draining from incision AND [2] no fever    Additional Information    Negative: [1] Major abdominal surgical incision AND [2] wound gaping open AND [3] visible internal organs    Negative: Sounds like a life-threatening emergency to the triager    Negative: Severe pain in the incision    Negative: [1] Suture came out early AND [2] wound gaping AND [3] < 48 hours since sutures placed    Negative: [1] Incision gaping open AND [2] length of opening > 2 inches (5 cm)    Negative: Patient sounds very sick or weak to the triager    Negative: Sounds like a serious complication to the triager    Negative: Fever > 100.5 F (38.1 C)    Negative: [1] Incision looks infected (spreading redness, pain) AND [2] fever > 99.5 F (37.5 C)    Negative: [1] Incision looks infected (spreading redness, pain) AND [2] large red area (> 2 in. or 5 cm)    Negative: [1] Incision looks infected (spreading redness, pain) AND [2] face wound    Negative: [1] Red streak runs from the incision AND [2] longer than 1 inch (2.5 cm)    Protocols used: POST-OP INCISION SYMPTOMS-ADULT-AH

## 2018-10-09 NOTE — TELEPHONE ENCOUNTER
Rickie recently had surgery on right rib and then lung collasped and now has drainage from where chest tube was placed.   Drainage is yellow pus and denies fever or severe pain.  FNA instructed Rickie to get a hold of Surgeon direct and Rickie agreed.

## 2018-10-09 NOTE — TELEPHONE ENCOUNTER
Patient called to report yellow drainage from chest tube site. Per  patient is to clean with hydrogen peroxide daily. Patient advised to call back if he starts experiencing fevers. Patient scheduled to see Dr. Madden on Thursday.    Carline MAGAÑAN, RN

## 2018-10-11 ENCOUNTER — OFFICE VISIT (OUTPATIENT)
Dept: OTHER | Facility: CLINIC | Age: 28
End: 2018-10-11
Attending: SURGERY
Payer: COMMERCIAL

## 2018-10-11 VITALS — SYSTOLIC BLOOD PRESSURE: 131 MMHG | DIASTOLIC BLOOD PRESSURE: 79 MMHG | HEART RATE: 114 BPM

## 2018-10-11 DIAGNOSIS — G54.0 TOS (THORACIC OUTLET SYNDROME): Primary | ICD-10-CM

## 2018-10-11 PROCEDURE — 99024 POSTOP FOLLOW-UP VISIT: CPT | Mod: ZP | Performed by: SURGERY

## 2018-10-11 PROCEDURE — G0463 HOSPITAL OUTPT CLINIC VISIT: HCPCS

## 2018-10-11 NOTE — PROGRESS NOTES
[place] [facility]    Rickie Lagos return to see me today in follow-up.  He had vasogenic TOS with rethrombosis of his right subclavian vein despite Xarelto.  He underwent a right transaxillary first rib resection scalenectomy and mobilization of the subclavian vein which had re-thrombosed.  He had minimal ALICIA output following the procedure and underwent successful Possis thrombectomy and balloon angioplasty the following morning.  Again minimal output out of his ALICIA with reinitiation of full anticoagulation on heparin.  Following morning he had some more lower pleuritic chest pain but unremarkable chest x-ray except for apical Hematoma.  Unfortunately, later that day the pain became much worse with shortness of breath and found to have a right hemothorax requiring transfer to ICU, blood transfusion, and tube thoracostomy drainage.  No evidence of ongoing bleeding though we did stop all anticoagulation.  He still had some residual thrombus over the diaphragm and underwent a VATS by Dr. Morelos to resolve this.    Since his discharge she is done well.  He still has some discomfort and overall weakness but is otherwise felt fine.  No increased arm swelling.  No shortness of breath.  Small amount of purulent drainage from the new chest tube site for which she is applying peroxide.    He is on Xarelto 15 mg p.o. twice daily from the day of discharge.    Exam: Alert and appropriate.  Blood pressure 131/79.              Chest= clear              Wounds are inspected.  Chest tube sites are clean with no cellulitis.               Sutures from original chest tube site were removed.               Adherent tape was also removed along with the Steri-Strips and                               Redressed.      Impression: Overall improved.  Taking only 1-2 oxycodones daily.  Using incentive spirometer.  On Xarelto 15 mg twice daily.  We will schedule for repeat right arm venogram with Dr. Barajas in approximately  week and a half.  Chest x-ray will be evaluated at that time.  He will hold his Xarelto the morning of the procedure.       Kong Madden MD     Please route or send letter to:  Dr. Barajas    Interventional radiology

## 2018-10-11 NOTE — NURSING NOTE
"Rickie Lagos is a 28 year old male who presents for:  Chief Complaint   Patient presents with     RECHECK     1st post op TRANSAXILLARY RESECT FIRST RIB 9/28/18.        Vitals:    Vitals:    10/11/18 1624   BP: 131/79   BP Location: Left arm   Patient Position: Chair   Cuff Size: Adult Regular   Pulse: 114       BMI:  Estimated body mass index is 18.57 kg/(m^2) as calculated from the following:    Height as of 9/26/18: 6' 4\" (1.93 m).    Weight as of 10/1/18: 152 lb 8.9 oz (69.2 kg).    Pain Score:  Data Unavailable        Digna Knight"

## 2018-10-11 NOTE — LETTER
[facility]  [address]  [place] [address]  Phone: [phone]  Fax: [phone]      2018    RE: Rickie Lagos, : 1990      [facility]     Rickie Lagos return to see me today in follow-up.  He had vasogenic TOS with rethrombosis of his right subclavian vein despite Xarelto.  He underwent a right transaxillary first rib resection scalenectomy and mobilization of the subclavian vein which had re-thrombosed.  He had minimal ALICIA output following the procedure and underwent successful Possis thrombectomy and balloon angioplasty the following morning.  Again minimal output out of his ALICIA with reinitiation of full anticoagulation on heparin.  Following morning he had some more lower pleuritic chest pain but unremarkable chest x-ray except for apical Hematoma.  Unfortunately, later that day the pain became much worse with shortness of breath and found to have a right hemothorax requiring transfer to ICU, blood transfusion, and tube thoracostomy drainage.  No evidence of ongoing bleeding though we did stop all anticoagulation.  He still had some residual thrombus over the diaphragm and underwent a VATS by Dr. Morelos to resolve this.     Since his discharge she is done well.  He still has some discomfort and overall weakness but is otherwise felt fine.  No increased arm swelling.  No shortness of breath.  Small amount of purulent drainage from the new chest tube site for which she is applying peroxide.     He is on Xarelto 15 mg p.o. twice daily from the day of discharge.     Exam: Alert and appropriate.  Blood pressure 131/79.              Chest= clear              Wounds are inspected.  Chest tube sites are clean with no cellulitis.               Sutures from original chest tube site were removed.               Adherent tape was also removed along with the Steri-Strips and                               Redressed.        Impression: Overall improved.  Taking only 1-2 oxycodones daily.  Using incentive spirometer.  On Xarelto 15 mg twice daily.  We will schedule for repeat right arm venogram with Dr. Barajas in approximately week and a half.  Chest x-ray will be evaluated at that time.  He will hold his Xarelto the morning of the procedure.    Sincerely,       Kong Madden MD

## 2018-10-11 NOTE — MR AVS SNAPSHOT
"              After Visit Summary   10/11/2018    Rickie Lagos    MRN: [id number]           Patient Information     Date Of Birth          1990        Visit Information        Provider Department      10/11/2018 4:15 PM Kong Madden MD [facility]      Today's Diagnoses     TOS (thoracic outlet syndrome)    -  1       Follow-ups after your visit        Who to contact     If you have questions or need follow up information about today's clinic visit or your schedule please contact [facility] directly at [phone].  Normal or non-critical lab and imaging results will be communicated to you by Proxim [employer], letter or phone within 4 business days after the clinic has received the results. If you do not hear from us within 7 days, please contact the clinic through [employer] or phone. If you have a critical or abnormal lab result, we will notify you by phone as soon as possible.  Submit refill requests through [employer] or call your pharmacy and they will forward the refill request to us. Please allow 3 business days for your refill to be completed.          Additional Information About Your Visit        MyChart Information     [employer] lets you send messages to your doctor, view your test results, renew your prescriptions, schedule appointments and more. To sign up, go to [email or web address] . Click on \"Log in\" on the left side of the screen, which will take you to the Welcome page. Then click on \"Sign up Now\" on the right side of the page.     You will be asked to enter the access code listed below, as well as some personal information. Please follow the directions to create your username and password.     Your access code is: [email or web address]  Expires: 12/15/2018  2:56 PM     Your access code will  in 90 days. If you need help or a new code, please call your [place] clinic or [phone].        Care " EveryWhere ID     This is your Care EveryWhere ID. This could be used by other organizations to access your [place] medical records  [id number]        Your Vitals Were     Pulse                   114            Blood Pressure from Last 3 Encounters:   10/11/18 131/79   10/06/18 118/70   09/26/18 120/77    Weight from Last 3 Encounters:   10/01/18 152 lb 8.9 oz (69.2 kg)   09/26/18 172 lb 12.8 oz (78.4 kg)   09/21/18 169 lb 4.8 oz (76.8 kg)              Today, you had the following     No orders found for display       Primary Care Provider Office Phone # Fax #    Rickie Ruiz [phone] [address]  [place] [address]        Equal Access to Services     JOSIAH DASH : Hadii aad ku hadasho Soomaali, waaxda luqadaha, qaybta kaalmada adeegyada, lashonda [place] . So [facility] [phone].    ATENCIÓN: Si habla español, tiene a tariq disposición servicios gratuitos de asistencia lingüística. Taylor al [phone].    We comply with applicable federal civil rights laws and [place] laws. We do not discriminate on the basis of race, color, national origin, age, disability, sex, sexual orientation, or gender identity.            Thank you!     Thank you for choosing [facility] [place]  for your care. Our goal is always to provide you with excellent care. Hearing back from our patients is one way we can continue to improve our services. Please take a few minutes to complete the written survey that you may receive in the mail after your visit with us. Thank you!             Your Updated Medication List - Protect others around you: Learn how to safely use, store and throw away your medicines at www.disposemymeds.org.          This list is accurate as of 10/11/18  5:07 PM.  Always use your most recent med list.                   Brand Name Dispense Instructions for use Diagnosis    ferrous sulfate 325 (65 Fe) MG tablet    IRON    100 tablet     Take 1 tablet (325 mg) by mouth 2 times daily    Anemia due to blood loss, acute       HYDROcodone-acetaminophen 5-325 MG per tablet    NORCO    12 tablet    Take 1 tablet by mouth every 6 hours as needed for severe pain Every 4 hours to every 6 hours as needed    Acute thrombosis of right subclavian vein (H)       LORazepam 0.5 MG tablet    ATIVAN    5 tablet    Take 1-2 tablets (0.5-1 mg) by mouth every 8 hours as needed (taken 1 hour before the procedure) Do not drive after taking this medication    Situational anxiety       methocarbamol 750 MG tablet    ROBAXIN    12 tablet    Take 1 tablet (750 mg) by mouth 4 times daily as needed for muscle spasms    Thoracic outlet syndrome       oxyCODONE IR 5 MG tablet    ROXICODONE    30 tablet    Take 1-2 tablets (5-10 mg) by mouth every 4 hours as needed for moderate to severe pain    Thoracic outlet syndrome       rivaroxaban ANTICOAGULANT 15 MG Tabs tablet    XARELTO    42 tablet    Take 1 tablet (15 mg) by mouth 2 times daily (with meals)    Acute thrombosis of right subclavian vein (H)       traMADol 50 MG tablet    ULTRAM    10 tablet    Take 1 tablet (50 mg) by mouth every 6 hours as needed for moderate to severe pain or severe pain    DVT of axillary vein, acute right (H)

## 2018-10-12 ENCOUNTER — TELEPHONE (OUTPATIENT)
Dept: OTHER | Facility: CLINIC | Age: 28
End: 2018-10-12

## 2018-10-12 NOTE — TELEPHONE ENCOUNTER
SUSAN on cell phone asking Rickie to call.  Dr. Madden requesting Right arm venogram in the next 1-2 weeks with Dr. Hernandez. Kellen Ashton,

## 2018-10-17 NOTE — TELEPHONE ENCOUNTER
Spoke with Rickie.  He is scheduled for right arm venogram on 10/22/18.  Type of surgery:RIGHT ARM VENOGRAM  Location of surgery: [facility]  Date and time of surgery: 10/22/18 @ 11:00am  Surgeon: DR. EMMANUEL  Pre-Op Appt Date: IN EPIC  Post-Op Appt Date: PT TO SCHEDULE   Packet sent out: NOT SENT PER PATIENT REQUEST  Pre-cert/Authorization completed:  Yes  Date: 10/16/18

## 2018-10-19 DIAGNOSIS — I82.B11: ICD-10-CM

## 2018-10-19 NOTE — TELEPHONE ENCOUNTER
"Pt called requesting refill of Xarelto.   10-11-18 Dr. Madden notes \"On Xarelto 15 mg twice daily.  We will schedule for repeat right arm venogram with Dr. Barajas in approximately week and a half.  Chest x-ray will be evaluated at that time.  He will hold his Xarelto the morning of the procedure. \"  I called pt to verify pharmacy location, Med and Pharm loaded, pt notes understanding.   GÓMEZ CoffeyN, RN        "

## 2018-10-22 ENCOUNTER — HOSPITAL ENCOUNTER (OUTPATIENT)
Facility: CLINIC | Age: 28
Discharge: HOME | End: 2018-10-22
Attending: RADIOLOGY | Admitting: RADIOLOGY
Payer: COMMERCIAL

## 2018-10-22 ENCOUNTER — APPOINTMENT (OUTPATIENT)
Dept: INTERVENTIONAL RADIOLOGY/VASCULAR | Facility: CLINIC | Age: 28
End: 2018-10-22
Attending: SURGERY
Payer: COMMERCIAL

## 2018-10-22 VITALS
OXYGEN SATURATION: 100 % | RESPIRATION RATE: 16 BRPM | HEART RATE: 83 BPM | HEIGHT: 76 IN | DIASTOLIC BLOOD PRESSURE: 83 MMHG | TEMPERATURE: 98.5 F | SYSTOLIC BLOOD PRESSURE: 124 MMHG | WEIGHT: 152.56 LBS | BODY MASS INDEX: 18.58 KG/M2

## 2018-10-22 DIAGNOSIS — G54.0 THORACIC OUTLET SYNDROME: ICD-10-CM

## 2018-10-22 PROCEDURE — 27210906 ZZH KIT CR8

## 2018-10-22 PROCEDURE — C1757 CATH, THROMBECTOMY/EMBOLECT: HCPCS

## 2018-10-22 PROCEDURE — 25000128 H RX IP 250 OP 636: Performed by: RADIOLOGY

## 2018-10-22 PROCEDURE — 27210845 ZZH DEVICE INFLATION CR5

## 2018-10-22 PROCEDURE — C1769 GUIDE WIRE: HCPCS

## 2018-10-22 PROCEDURE — 27210804 ZZH SHEATH CR3

## 2018-10-22 PROCEDURE — 25500064 ZZH RX 255 OP 636: Performed by: RADIOLOGY

## 2018-10-22 PROCEDURE — 27210886 ZZH ACCESSORY CR5

## 2018-10-22 PROCEDURE — 25000128 H RX IP 250 OP 636: Performed by: NURSE PRACTITIONER

## 2018-10-22 PROCEDURE — 75820 VEIN X-RAY ARM/LEG: CPT | Mod: RT

## 2018-10-22 PROCEDURE — 27210742 ZZH CATH CR1

## 2018-10-22 PROCEDURE — 25000125 ZZHC RX 250

## 2018-10-22 PROCEDURE — 40000853 ZZH STATISTIC ANGIOGRAM, STENT, VERTEBRO PLASTY

## 2018-10-22 PROCEDURE — C1725 CATH, TRANSLUMIN NON-LASER: HCPCS

## 2018-10-22 PROCEDURE — 25000128 H RX IP 250 OP 636

## 2018-10-22 RX ORDER — NALOXONE HYDROCHLORIDE 0.4 MG/ML
.1-.4 INJECTION, SOLUTION INTRAMUSCULAR; INTRAVENOUS; SUBCUTANEOUS
Status: DISCONTINUED | OUTPATIENT
Start: 2018-10-22 | End: 2018-10-22 | Stop reason: HOSPADM

## 2018-10-22 RX ORDER — FLUMAZENIL 0.1 MG/ML
0.2 INJECTION, SOLUTION INTRAVENOUS
Status: DISCONTINUED | OUTPATIENT
Start: 2018-10-22 | End: 2018-10-22 | Stop reason: HOSPADM

## 2018-10-22 RX ORDER — SODIUM CHLORIDE 9 MG/ML
INJECTION, SOLUTION INTRAVENOUS CONTINUOUS
Status: DISCONTINUED | OUTPATIENT
Start: 2018-10-22 | End: 2018-10-22 | Stop reason: HOSPADM

## 2018-10-22 RX ORDER — ACETAMINOPHEN 500 MG
500 TABLET ORAL EVERY 6 HOURS PRN
Status: DISCONTINUED | OUTPATIENT
Start: 2018-10-22 | End: 2018-10-22 | Stop reason: HOSPADM

## 2018-10-22 RX ORDER — LIDOCAINE HYDROCHLORIDE 10 MG/ML
INJECTION, SOLUTION INFILTRATION; PERINEURAL
Status: DISCONTINUED
Start: 2018-10-22 | End: 2018-10-22 | Stop reason: HOSPADM

## 2018-10-22 RX ORDER — LIDOCAINE 40 MG/G
CREAM TOPICAL
Status: DISCONTINUED | OUTPATIENT
Start: 2018-10-22 | End: 2018-10-22 | Stop reason: HOSPADM

## 2018-10-22 RX ORDER — LIDOCAINE HYDROCHLORIDE 10 MG/ML
1-30 INJECTION, SOLUTION EPIDURAL; INFILTRATION; INTRACAUDAL; PERINEURAL
Status: COMPLETED | OUTPATIENT
Start: 2018-10-22 | End: 2018-10-22

## 2018-10-22 RX ORDER — IOPAMIDOL 612 MG/ML
75 INJECTION, SOLUTION INTRAVASCULAR ONCE
Status: COMPLETED | OUTPATIENT
Start: 2018-10-22 | End: 2018-10-22

## 2018-10-22 RX ORDER — FENTANYL CITRATE 50 UG/ML
INJECTION, SOLUTION INTRAMUSCULAR; INTRAVENOUS
Status: COMPLETED
Start: 2018-10-22 | End: 2018-10-22

## 2018-10-22 RX ORDER — FENTANYL CITRATE 50 UG/ML
25-50 INJECTION, SOLUTION INTRAMUSCULAR; INTRAVENOUS EVERY 5 MIN PRN
Status: DISCONTINUED | OUTPATIENT
Start: 2018-10-22 | End: 2018-10-22 | Stop reason: HOSPADM

## 2018-10-22 RX ORDER — FENTANYL CITRATE 50 UG/ML
INJECTION, SOLUTION INTRAMUSCULAR; INTRAVENOUS
Status: DISCONTINUED
Start: 2018-10-22 | End: 2018-10-22 | Stop reason: HOSPADM

## 2018-10-22 RX ADMIN — SODIUM CHLORIDE: 9 INJECTION, SOLUTION INTRAVENOUS at 10:29

## 2018-10-22 RX ADMIN — MIDAZOLAM HYDROCHLORIDE 0.5 MG: 1 INJECTION, SOLUTION INTRAMUSCULAR; INTRAVENOUS at 11:45

## 2018-10-22 RX ADMIN — MIDAZOLAM HYDROCHLORIDE 1 MG: 1 INJECTION, SOLUTION INTRAMUSCULAR; INTRAVENOUS at 11:13

## 2018-10-22 RX ADMIN — HEPARIN SODIUM 10000 UNITS: 10000 INJECTION, SOLUTION INTRAVENOUS; SUBCUTANEOUS at 11:28

## 2018-10-22 RX ADMIN — FENTANYL CITRATE 25 MCG: 50 INJECTION, SOLUTION INTRAMUSCULAR; INTRAVENOUS at 11:45

## 2018-10-22 RX ADMIN — FENTANYL CITRATE 25 MCG: 50 INJECTION, SOLUTION INTRAMUSCULAR; INTRAVENOUS at 11:33

## 2018-10-22 RX ADMIN — FENTANYL CITRATE 50 MCG: 50 INJECTION, SOLUTION INTRAMUSCULAR; INTRAVENOUS at 11:27

## 2018-10-22 RX ADMIN — FENTANYL CITRATE 50 MCG: 50 INJECTION, SOLUTION INTRAMUSCULAR; INTRAVENOUS at 11:08

## 2018-10-22 RX ADMIN — MIDAZOLAM HYDROCHLORIDE 0.5 MG: 1 INJECTION, SOLUTION INTRAMUSCULAR; INTRAVENOUS at 11:38

## 2018-10-22 RX ADMIN — MIDAZOLAM HYDROCHLORIDE 1 MG: 1 INJECTION, SOLUTION INTRAMUSCULAR; INTRAVENOUS at 11:08

## 2018-10-22 RX ADMIN — MIDAZOLAM HYDROCHLORIDE 0.5 MG: 1 INJECTION, SOLUTION INTRAMUSCULAR; INTRAVENOUS at 11:32

## 2018-10-22 RX ADMIN — MIDAZOLAM HYDROCHLORIDE 1 MG: 1 INJECTION, SOLUTION INTRAMUSCULAR; INTRAVENOUS at 11:26

## 2018-10-22 RX ADMIN — IOPAMIDOL 36 ML: 612 INJECTION, SOLUTION INTRAVENOUS at 11:55

## 2018-10-22 RX ADMIN — LIDOCAINE HYDROCHLORIDE 3 ML: 10 INJECTION, SOLUTION INFILTRATION; PERINEURAL at 11:50

## 2018-10-22 RX ADMIN — FENTANYL CITRATE 50 MCG: 50 INJECTION, SOLUTION INTRAMUSCULAR; INTRAVENOUS at 11:13

## 2018-10-22 RX ADMIN — LIDOCAINE HYDROCHLORIDE 3 ML: 10 INJECTION, SOLUTION EPIDURAL; INFILTRATION; INTRACAUDAL; PERINEURAL at 11:50

## 2018-10-22 RX ADMIN — FENTANYL CITRATE 25 MCG: 50 INJECTION, SOLUTION INTRAMUSCULAR; INTRAVENOUS at 11:39

## 2018-10-22 NOTE — IP AVS SNAPSHOT
[facility] Interventional Radiology    [address] [place] [id number]    Phone:  [phone]                                       After Visit Summary   10/22/2018    Rickie Lagos    MRN: [id number]           After Visit Summary Signature Page     I have received my discharge instructions, and my questions have been answered. I have discussed any challenges I see with this plan with the nurse or doctor.    ..........................................................................................................................................  Patient/Patient Representative Signature      ..........................................................................................................................................  Patient Representative Print Name and Relationship to Patient    ..................................................               ................................................  Date                                   Time    ..........................................................................................................................................  Reviewed by Signature/Title    ...................................................              ..............................................  Date                                               Time          22EPIC Rev 08/18

## 2018-10-22 NOTE — PROGRESS NOTES
Interventional Radiology Pre-Procedure Sedation Assessment   Time of Assessment: 10:48 AM    Expected Level: Moderate Sedation    Indication: Sedation is required for the following type of Procedure: Right upper arm venogram with possible venoplasty and/or thrombectomy with alteplase and IV moderate sedation    Sedation and procedural consent: Risks, benefits and alternatives were discussed with Patient and Parent(s)    PO Intake: Appropriately NPO for procedure    ASA Class: Class 1 - HEALTHY PATIENT    Mallampati: Grade 1:  Soft palate, uvula, tonsillar pillars, and posterior pharyngeal wall visible    Lungs: Lungs Clear with good breath sounds bilaterally    Heart: Normal heart sounds and rate    History and physical reviewed and no updates needed. I have reviewed the lab findings, diagnostic data, medications, and the plan for sedation. I have determined this patient to be an appropriate candidate for the planned sedation and procedure and have reassessed the patient IMMEDIATELY PRIOR to sedation and procedure.    Duyen Sahu, APRN CNP

## 2018-10-22 NOTE — DISCHARGE INSTRUCTIONS
Venogram Discharge Instructions - Brachial    After you go home:      Have an adult stay with you until tomorrow.    Drink extra fluids for 2 days.    You may resume your normal diet.    No smoking       For 24 hours - due to the sedation you received:    Relax and take it easy.    Do NOT make any important or legal decisions.    Do NOT drive or operate machines at home or at work.    Do NOT drink alcohol.    Care of Arm Puncture Site:      For the first 24 hrs - check the puncture site every 1-2 hours while awake.    Remove the bandaid after 24 hours. If there is minor oozing, apply another bandaid and remove it after 12 hours.    It is normal to have a small bruise or pea size lump at the site.    You may shower tomorrow.  Do NOT take a bath, or use a hot tub or pool for at least 3 days. Do NOT scrub the site. Do not use lotion or powder near the puncture site.     Activity:      For 2 days, do not use your hand or arm to support your weight (such as rising from a chair)     For 2 days, do not lift more than 5 pounds or exercise your arm (such as tennis, golf or bowling).    Bleeding:      If you start bleeding from the site in your arm, sit down and press firmly on the site for 10 minutes.     Once bleeding stops, keep your arm straight for 2 hours.     Call the [facility] as soon as you can.       Call 911 right away if you have heavy bleeding or bleeding that does not stop.      Medicines:      If you are taking an antiplatelet medication such as Plavix, do not stop taking it until you talk to your provider.       Take your medications, including blood thinners, unless your provider tells you not to.  If you take Coumadin (Warfarin), have your INR checked by your provider in  3-5 days. Call your clinic to schedule this.    If you have stopped any medicines, check with your provider about when to restart them.               Follow Up Appointments:      Follow up with [facility] as  directed.    Call the clinic if:      You have increased pain or a large or growing hard lump around the site.    The site is red, swollen, hot or tender.    Blood or fluid is draining from the site.    You have chills or a fever greater than 101 F (38 C).    Your arm feels numb, cool or changes color.    You have hives, a rash or unusual itching.    Any questions or concerns.    Other Instructions:      If you received a stent - carry your stent card with you at all times.      If you have questions or your original symptoms do not improve, call:            [facility] @ [phone]

## 2018-10-22 NOTE — IP AVS SNAPSHOT
MRN:[id number]                      After Visit Summary   10/22/2018    Rickie Lagos    MRN: [id number]           Visit Information        Department      10/22/2018  9:20 AM [facility] Interventional Radiology          Review of your medicines      UNREVIEWED medicines. Ask your doctor about these medicines        Dose / Directions    DOCUSATE SODIUM PO        Refills:  0       ferrous sulfate 325 (65 Fe) MG tablet   Commonly known as:  IRON   Used for:  Anemia due to blood loss, acute        Dose:  325 mg   Take 1 tablet (325 mg) by mouth 2 times daily   Quantity:  100 tablet   Refills:  0       HYDROcodone-acetaminophen 5-325 MG per tablet   Commonly known as:  NORCO   Used for:  Acute thrombosis of right subclavian vein (H)        Dose:  1 tablet   Take 1 tablet by mouth every 6 hours as needed for severe pain Every 4 hours to every 6 hours as needed   Quantity:  12 tablet   Refills:  0       LORazepam 0.5 MG tablet   Commonly known as:  ATIVAN   Used for:  Situational anxiety        Dose:  0.5-1 mg   Take 1-2 tablets (0.5-1 mg) by mouth every 8 hours as needed (taken 1 hour before the procedure) Do not drive after taking this medication   Quantity:  5 tablet   Refills:  0       methocarbamol 750 MG tablet   Commonly known as:  ROBAXIN   Used for:  Thoracic outlet syndrome        Dose:  750 mg   Take 1 tablet (750 mg) by mouth 4 times daily as needed for muscle spasms   Quantity:  12 tablet   Refills:  0       oxyCODONE IR 5 MG tablet   Commonly known as:  ROXICODONE   Used for:  Thoracic outlet syndrome        Dose:  5-10 mg   Take 1-2 tablets (5-10 mg) by mouth every 4 hours as needed for moderate to severe pain   Quantity:  30 tablet   Refills:  0       rivaroxaban ANTICOAGULANT 15 MG Tabs tablet   Commonly known as:  XARELTO   Used for:  Acute thrombosis of right subclavian vein (H)        Dose:  15 mg   Take 1 tablet (15 mg) by mouth 2 times daily (with meals)   Quantity:  42  tablet   Refills:  0       traMADol 50 MG tablet   Commonly known as:  ULTRAM   Used for:  DVT of axillary vein, acute right (H)        Dose:  50 mg   Take 1 tablet (50 mg) by mouth every 6 hours as needed for moderate to severe pain or severe pain   Quantity:  10 tablet   Refills:  0                Protect others around you: Learn how to safely use, store and throw away your medicines at www.disposemymeds.org.         Follow-ups after your visit         Care Instructions        Further instructions from your care team       Venogram Discharge Instructions - Brachial    After you go home:      Have an adult stay with you until tomorrow.    Drink extra fluids for 2 days.    You may resume your normal diet.    No smoking       For 24 hours - due to the sedation you received:    Relax and take it easy.    Do NOT make any important or legal decisions.    Do NOT drive or operate machines at home or at work.    Do NOT drink alcohol.    Care of Arm Puncture Site:      For the first 24 hrs - check the puncture site every 1-2 hours while awake.    Remove the bandaid after 24 hours. If there is minor oozing, apply another bandaid and remove it after 12 hours.    It is normal to have a small bruise or pea size lump at the site.    You may shower tomorrow.  Do NOT take a bath, or use a hot tub or pool for at least 3 days. Do NOT scrub the site. Do not use lotion or powder near the puncture site.     Activity:      For 2 days, do not use your hand or arm to support your weight (such as rising from a chair)     For 2 days, do not lift more than 5 pounds or exercise your arm (such as tennis, golf or bowling).    Bleeding:      If you start bleeding from the site in your arm, sit down and press firmly on the site for 10 minutes.     Once bleeding stops, keep your arm straight for 2 hours.     Call the [facility] as soon as you can.       Call 911 right away if you have heavy bleeding or bleeding that does not  "stop.      Medicines:      If you are taking an antiplatelet medication such as Plavix, do not stop taking it until you talk to your provider.       Take your medications, including blood thinners, unless your provider tells you not to.  If you take Coumadin (Warfarin), have your INR checked by your provider in  3-5 days. Call your clinic to schedule this.    If you have stopped any medicines, check with your provider about when to restart them.               Follow Up Appointments:      Follow up with [facility] as directed.    Call the clinic if:      You have increased pain or a large or growing hard lump around the site.    The site is red, swollen, hot or tender.    Blood or fluid is draining from the site.    You have chills or a fever greater than 101 F (38 C).    Your arm feels numb, cool or changes color.    You have hives, a rash or unusual itching.    Any questions or concerns.    Other Instructions:      If you received a stent - carry your stent card with you at all times.      If you have questions or your original symptoms do not improve, call:            [facility] @ [phone]           Additional Information About Your Visit        [email or web address] Information     2d[employer] lets you send messages to your doctor, view your test results, renew your prescriptions, schedule appointments and more. To sign up, go to [email or web address] . Click on \"Log in\" on the left side of the screen, which will take you to the Welcome page. Then click on \"Sign up Now\" on the right side of the page.     You will be asked to enter the access code listed below, as well as some personal information. Please follow the directions to create your username and password.     Your access code is: [email or web address]  Expires: 12/15/2018  2:56 PM     Your access code will  in 90 days. If you need help or a new code, please call your [place] clinic or [phone].        Care EveryWhere ID     This is your Care " "EveryWhere ID. This could be used by other organizations to access your [facility] medical records  [id number]        Your Vitals Were     Blood Pressure Pulse Temperature Respirations Height Weight    107/64 83 98.5  F (36.9  C) (Oral) 14 1.93 m (6' 3.98\") 69.2 kg (152 lb 8.9 oz)    Pulse Oximetry BMI (Body Mass Index)                97% 18.58 kg/m2           Primary Care Provider Office Phone # Fax #    Rickie Ruiz [phone]      Equal Access to Services     [facility]: Hadii erika macdonald hadasho Soomaali, waaxda luqadaha, qaybta kaalmada abhay, lashonda [place] . So [facility] [phone].    ATENCIÓN: Si habla español, tiene a tariq disposición servicios gratuitos de asistencia lingüística. Ll[facility] [phone].    We comply with applicable federal civil rights laws and [place] laws. We do not discriminate on the basis of race, color, national origin, age, disability, sex, sexual orientation, or gender identity.            Thank you!     Thank you for choosing [facility] for your care. Our goal is always to provide you with excellent care. Hearing back from our patients is one way we can continue to improve our services. Please take a few minutes to complete the written survey that you may receive in the mail after you visit with us. Thank you!             Medication List: This is a list of all your medications and when to take them. Check marks below indicate your daily home schedule. Keep this list as a reference.      Medications           Morning Afternoon Evening Bedtime As Needed    DOCUSATE SODIUM PO                                ferrous sulfate 325 (65 Fe) MG tablet   Commonly known as:  IRON   Take 1 tablet (325 mg) by mouth 2 times daily                                HYDROcodone-acetaminophen 5-325 MG per tablet   Commonly known as:  NORCO   Take 1 tablet by mouth every 6 hours as needed for severe pain Every 4 hours to every 6 hours as needed          "                       LORazepam 0.5 MG tablet   Commonly known as:  ATIVAN   Take 1-2 tablets (0.5-1 mg) by mouth every 8 hours as needed (taken 1 hour before the procedure) Do not drive after taking this medication                                methocarbamol 750 MG tablet   Commonly known as:  ROBAXIN   Take 1 tablet (750 mg) by mouth 4 times daily as needed for muscle spasms                                oxyCODONE IR 5 MG tablet   Commonly known as:  ROXICODONE   Take 1-2 tablets (5-10 mg) by mouth every 4 hours as needed for moderate to severe pain                                rivaroxaban ANTICOAGULANT 15 MG Tabs tablet   Commonly known as:  XARELTO   Take 1 tablet (15 mg) by mouth 2 times daily (with meals)                                traMADol 50 MG tablet   Commonly known as:  ULTRAM   Take 1 tablet (50 mg) by mouth every 6 hours as needed for moderate to severe pain or severe pain

## 2018-10-22 NOTE — IR NOTE
Interventional Radiology Intra-procedural Nursing Note    Patient Name: Rickie Lagos  Medical Record Number: [id number]  Today's Date: October 22, 2018    Start Time: 1110  End of procedure time: 1149  Procedure: right arm venogram, mechanical thrombectomy, angioplasty  Report given to: care suites rn  Time pt departs:  1205  : n/a    Other Notes: pt tolerated procedure well. VSS on room air in no respiratory distress, respirations regular. +CSM to RUE, +3 radial pulse. Sedation totals:  4.5mg Versed IV,  225mcg Fentanyl IV    Kimberly Vital RN

## 2018-10-22 NOTE — PROGRESS NOTES
Stable post venogram. Ate meal, taking fluids, took own xeralto as prescribed. IV discontinued. Reveiwed discharge instructions.

## 2018-10-22 NOTE — PROGRESS NOTES
1200 Pt returned from [facility]. Gauze drsg CDI to right arm puncture site. No oozing or hematoma noted. Area soft & flat. Arm board intact. Pt denies pain. Pt instructed on activity restrictions with right arm. Verbal understanding received from pt.    1235 Pt's family at bedside. Detailed update given.     1235 Pt taking flds well. No complaints.

## 2018-10-23 ENCOUNTER — TELEPHONE (OUTPATIENT)
Dept: OTHER | Facility: CLINIC | Age: 28
End: 2018-10-23

## 2018-10-23 DIAGNOSIS — G54.0 THORACIC OUTLET SYNDROME: Primary | ICD-10-CM

## 2018-10-23 NOTE — TELEPHONE ENCOUNTER
[place] [facility]    I called Rickie Lagos after his venogram yesterday by Dr Stern.  SCV had reoccluded as expected after his bleeding episode.    They were able to open the SCV with Possis and then dilate up to a 122 mm balloon with excellent results.  No extravasation noted.      He will be kept on the Xarelto.  Repeat venogram in about 3 weeks.    Patient had no concerns.    Kong Madden MD

## 2018-10-30 DIAGNOSIS — I82.B11: ICD-10-CM

## 2018-10-30 NOTE — TELEPHONE ENCOUNTER
"Patient called to report his insurance will be ending and would like a refill on Xarelto prior to that time. Per Dr. Madden note \"He will be kept on the Xarelto.  Repeat venogram in about 3 weeks\".    Unable to refill per [facility] protocol.  Med and pharm loaded for Dr. Madden.    Carline MAGAÑAN, RN       "

## 2018-11-05 ENCOUNTER — TELEPHONE (OUTPATIENT)
Dept: OTHER | Facility: CLINIC | Age: 28
End: 2018-11-05

## 2018-11-05 NOTE — TELEPHONE ENCOUNTER
Type of surgery: RIGHT ARM VENOGRAM  Location of surgery: [facility]  Date and time of surgery: 11/5/18  Surgeon: Dr Anthony Hernandez  Pre-Op Appt Date: unknown  Post-Op Appt Date: unknown   Packet sent out: mailed 10/26/18  Pre-cert/Authorization completed:  Sent for PA submission  Date: 11/5/18

## 2018-11-08 ENCOUNTER — TELEPHONE (OUTPATIENT)
Dept: OTHER | Facility: CLINIC | Age: 28
End: 2018-11-08

## 2018-11-08 NOTE — TELEPHONE ENCOUNTER
Rickie called asking for some samples of Xarelto due to cost reasons he is unable to pay for his prescription.   Four boxes of Xarelto 20 mg tablets left at  for patient to  and sign for. Medication information and side effects sent with samples.     I provided Rickie with Xarelto care path #  1-888-XARELTO ([phone]), to see if he can qualify for assistance and  [email or web address].    Carline MAGAÑAN, RN

## 2018-11-13 ENCOUNTER — OFFICE VISIT (OUTPATIENT)
Dept: FAMILY MEDICINE | Facility: CLINIC | Age: 28
End: 2018-11-13
Payer: COMMERCIAL

## 2018-11-13 VITALS
OXYGEN SATURATION: 98 % | WEIGHT: 178 LBS | SYSTOLIC BLOOD PRESSURE: 106 MMHG | DIASTOLIC BLOOD PRESSURE: 67 MMHG | HEART RATE: 102 BPM | TEMPERATURE: 98.3 F | BODY MASS INDEX: 21.68 KG/M2 | HEIGHT: 76 IN

## 2018-11-13 DIAGNOSIS — Z11.4 SCREENING FOR HIV (HUMAN IMMUNODEFICIENCY VIRUS): ICD-10-CM

## 2018-11-13 DIAGNOSIS — Z01.818 PREOP GENERAL PHYSICAL EXAM: Primary | ICD-10-CM

## 2018-11-13 DIAGNOSIS — I82.A11 DVT OF AXILLARY VEIN, ACUTE RIGHT (H): ICD-10-CM

## 2018-11-13 PROBLEM — G54.0 THORACIC OUTLET SYNDROME: Status: RESOLVED | Noted: 2018-09-28 | Resolved: 2018-11-13

## 2018-11-13 LAB
ERYTHROCYTE [DISTWIDTH] IN BLOOD BY AUTOMATED COUNT: 13.1 % (ref 10–15)
HCT VFR BLD AUTO: 46.6 % (ref 40–53)
HGB BLD-MCNC: 15.1 G/DL (ref 13.3–17.7)
MCH RBC QN AUTO: 29.2 PG (ref 26.5–33)
MCHC RBC AUTO-ENTMCNC: 32.4 G/DL (ref 31.5–36.5)
MCV RBC AUTO: 90 FL (ref 78–100)
PLATELET # BLD AUTO: 251 10E9/L (ref 150–450)
RBC # BLD AUTO: 5.17 10E12/L (ref 4.4–5.9)
WBC # BLD AUTO: 6.1 10E9/L (ref 4–11)

## 2018-11-13 PROCEDURE — 93000 ELECTROCARDIOGRAM COMPLETE: CPT | Performed by: INTERNAL MEDICINE

## 2018-11-13 PROCEDURE — 85027 COMPLETE CBC AUTOMATED: CPT | Performed by: INTERNAL MEDICINE

## 2018-11-13 PROCEDURE — 99214 OFFICE O/P EST MOD 30 MIN: CPT | Performed by: INTERNAL MEDICINE

## 2018-11-13 PROCEDURE — 36415 COLL VENOUS BLD VENIPUNCTURE: CPT | Performed by: INTERNAL MEDICINE

## 2018-11-13 PROCEDURE — 80048 BASIC METABOLIC PNL TOTAL CA: CPT | Performed by: INTERNAL MEDICINE

## 2018-11-13 PROCEDURE — 87389 HIV-1 AG W/HIV-1&-2 AB AG IA: CPT | Performed by: INTERNAL MEDICINE

## 2018-11-13 NOTE — PATIENT INSTRUCTIONS
Before Your Surgery      Call your surgeon if there is any change in your health. This includes signs of a cold or flu (such as a sore throat, runny nose, cough, rash or fever).    Do not smoke, drink alcohol or take over the counter medicine (unless your surgeon or primary care doctor tells you to) for the 24 hours before and after surgery.    If you take prescribed drugs: Follow your doctor s orders about which medicines to take and which to stop until after surgery.    Eating and drinking prior to surgery: follow the instructions from your surgeon    Take a shower or bath the night before surgery. Use the soap your surgeon gave you to gently clean your skin. If you do not have soap from your surgeon, use your regular soap. Do not shave or scrub the surgery site.  Wear clean pajamas and have clean sheets on your bed.     (Z01.818) Preop general physical exam  (primary encounter diagnosis)  Comment: We will request EKG and labs today and pending results you will be optimized for your upcoming surgery.  Plan: HIV Screening, EKG 12-lead complete w/read -         Clinics, CBC with platelets, Basic metabolic         panel            (Z11.4) Screening for HIV (human immunodeficiency virus)  Comment:   Plan:     (I82.A11) DVT of axillary vein, acute right (H)  Comment: Continue Xarelto until have been advised by vascular surgery to discontinue this.    Plan:

## 2018-11-13 NOTE — PROGRESS NOTES
[facility]  [address] [facility] [address]  [phone]  Dept: [phone]    PRE-OP EVALUATION:  Today's date: 2018    Rickie Lagos (: 1990) presents for pre-operative evaluation assessment as requested by Dr. Madden.  He requires evaluation and anesthesia risk assessment prior to undergoing surgery/procedure for treatment of Thoracic outlet syndrome.    Proposed Surgery/ Procedure: Venogram  Date of Surgery/ Procedure: 2018  Time of Surgery/ Procedure: 11:00 AM  Hospital/Surgical Facility: [facility]  Fax number for surgical facility:   Primary Physician: Rickie Ruiz  Type of Anesthesia Anticipated: to be determined    Patient has a Health Care Directive or Living Will:  YES on file    1. NO - Do you have a history of heart attack, stroke, stent, bypass or surgery on an artery in the head, neck, heart or legs?  2. YES - DO YOU EVER HAVE ANY PAIN OR DISCOMFORT IN YOUR CHEST? Related to recent rib resection - also left side chest pain at times may be muscle strain  3. NO - Do you have a history of  Heart Failure?  4. NO - Are you troubled by shortness of breath when: walking on the level, up a slight hill or at night?  5. NO - Do you currently have a cold, bronchitis or other respiratory infection?  6. NO - Do you have a cough, shortness of breath or wheezing?  7. NO - Do you sometimes get pains in the calves of your legs when you walk?  8. YES - DO YOU OR ANYONE IN YOUR FAMILY HAVE PREVIOUS HISTORY OF BLOOD CLOTS? Related to Thoracic Outlet Syndrome  9. NO - Do you or does anyone in your family have a serious bleeding problem such as prolonged bleeding following surgeries or cuts?  10. YES - HAVE YOU EVER HAD PROBLEMS WITH ANEMIA OR BEEN TOLD TO TAKE IRON PILLS? Recent hospitalization with resultant anemia  11. NO - Have you had any abnormal blood loss such as black, tarry or bloody stools, or abnormal vaginal bleeding?  12. YES - HAVE YOU  EVER HAD A BLOOD TRANSFUSION?   13. NO - Have you or any of your relatives ever had problems with anesthesia?  14. NO - Do you have sleep apnea, excessive snoring or daytime drowsiness?  15. NO - Do you have any prosthetic heart valves?  16. NO - Do you have prosthetic joints?  17. NO - Is there any chance that you may be pregnant?      HPI:     HPI related to upcoming procedure: Venogram      See problem list for active medical problems.  Problems all longstanding and stable, except as noted/documented.  See ROS for pertinent symptoms related to these conditions.                                                                                                                                                          .    MEDICAL HISTORY:     Patient Active Problem List    Diagnosis Date Noted     Thoracic outlet syndrome 09/28/2018     Priority: Medium     DVT of axillary vein, acute right (H) 09/26/2018     Priority: Medium      Past Medical History:   Diagnosis Date     DVT of axillary vein, acute right (H) 09/16/2018     TOS (thoracic outlet syndrome) 09/16/2018     Past Surgical History:   Procedure Laterality Date     HC TOOTH EXTRACTION W/FORCEP Bilateral     wisdom tooth extraction     THORACOSCOPY Right 10/4/2018    Procedure: THORACOSCOPY;  RIGHT VIDEO ASSISTED THORACOSCOPIC SURGERY, DRAINAGE OF HEMOTHORAX, REMOVAL OF INTRAPLEURAL CLOTS;  Surgeon: Sean Morelos MD;  Location:  OR     TRANSAXILLARY RESECT FIRST RIB Right 9/28/2018    Procedure: TRANSAXILLARY RESECT FIRST RIB;  RIGHT TRANSAXILLARY FIRST RIB RESECTION, SCALENECTOMY ;  Surgeon: Kong Madden MD;  Location:  OR     XR EXTREMITY VENOGRAM INJ RIGHT Right 09/16/2018     Current Outpatient Prescriptions   Medication Sig Dispense Refill     DOCUSATE SODIUM PO        ferrous sulfate (IRON) 325 (65 Fe) MG tablet Take 1 tablet (325 mg) by mouth 2 times daily 100 tablet 0     LORazepam (ATIVAN) 0.5 MG tablet Take 1-2 tablets (0.5-1  "mg) by mouth every 8 hours as needed (taken 1 hour before the procedure) Do not drive after taking this medication 5 tablet 0     rivaroxaban ANTICOAGULANT (XARELTO) 20 MG TABS tablet Take 1 tablet (20 mg) by mouth daily (with dinner) 20 tablet 0     [DISCONTINUED] rivaroxaban ANTICOAGULANT (XARELTO) 15 MG TABS tablet Take 1 tablet (15 mg) by mouth 2 times daily (with meals) 42 tablet 0     OTC products: None, except as noted above    Allergies   Allergen Reactions     Amoxicillin Rash      Latex Allergy: NO    Social History   Substance Use Topics     Smoking status: Current Every Day Smoker     Types: Other     Smokeless tobacco: Never Used      Comment: Patient vapes     Alcohol use Yes      Comment: very occasionally     History   Drug Use No       REVIEW OF SYSTEMS:   CONSTITUTIONAL: NEGATIVE for fever, chills, change in weight  ENT/MOUTH: NEGATIVE for ear, mouth and throat problems  RESP: NEGATIVE for significant cough or SOB  CV: NEGATIVE for chest pain, palpitations or peripheral edema    EXAM:   /67 (BP Location: Left arm, Cuff Size: Adult Regular)  Pulse 102  Temp 98.3  F (36.8  C) (Tympanic)  Ht 6' 4\" (1.93 m)  Wt 178 lb (80.7 kg)  SpO2 98%  BMI 21.67 kg/m2    GENERAL APPEARANCE: healthy, alert and no distress     EYES: EOMI,  PERRL     HENT: ear canals and TM's normal and nose and mouth without ulcers or lesions     NECK: no adenopathy, no asymmetry, masses, or scars and thyroid normal to palpation     RESP: lungs clear to auscultation - no rales, rhonchi or wheezes     CV: regular rates and rhythm, normal S1 S2, no S3 or S4 and no murmur, click or rub     ABDOMEN:  soft, nontender, no HSM or masses and bowel sounds normal     MS: extremities normal- no gross deformities noted, no evidence of inflammation in joints, FROM in all extremities.     SKIN: no suspicious lesions or rashes     NEURO: Normal strength and tone, sensory exam grossly normal, mentation intact and speech normal     PSYCH: " mentation appears normal. and affect normal/bright     LYMPHATICS: No cervical adenopathy    DIAGNOSTICS:     EKG: appears normal, NSR, extra premature atrial contraction, normal axis, normal intervals, no acute ST/T changes c/w ischemia, no LVH by voltage criteria, unchanged from previous tracings  Labs Resulted Today:   Results for orders placed or performed in visit on 11/13/18   CBC with platelets   Result Value Ref Range    WBC 6.1 4.0 - 11.0 10e9/L    RBC Count 5.17 4.4 - 5.9 10e12/L    Hemoglobin 15.1 13.3 - 17.7 g/dL    Hematocrit 46.6 40.0 - 53.0 %    MCV 90 78 - 100 fl    MCH 29.2 26.5 - 33.0 pg    MCHC 32.4 31.5 - 36.5 g/dL    RDW 13.1 10.0 - 15.0 %    Platelet Count 251 150 - 450 10e9/L     Labs Drawn and in Process:   Unresulted Labs Ordered in the Past 30 Days of this Admission     Date and Time Order Name Status Description    11/13/2018 1407 BASIC METABOLIC PANEL In process     11/13/2018 1407 HIV ANTIGEN ANTIBODY COMBO In process     9/21/2018 1419 FACTOR 2 PROTHROMBIN 60160O MUT ANAL In process     9/21/2018 1419 FACTOR 5 LEIDEN MUTATION ANALYSIS In process           Recent Labs   Lab Test  10/06/18   0826  10/05/18   0725  10/04/18   0757  10/03/18   1237   HGB   --   8.4*  8.9*   --    PLT  430   --   337   --    INR   --    --   1.06  1.02   NA   --   137  137   --    POTASSIUM   --   4.0  4.2   --    CR   --   0.75  0.77   --         IMPRESSION:   Reason for surgery/procedure: deep vein thrombosis axillary vein  Diagnosis/reason for consult: Pre-operative Evaluation    The proposed surgical procedure is considered LOW risk.    REVISED CARDIAC RISK INDEX  The patient has the following serious cardiovascular risks for perioperative complications such as (MI, PE, VFib and 3  AV Block):  No serious cardiac risks  INTERPRETATION: 0 risks: Class I (very low risk - 0.4% complication rate)    The patient has the following additional risks for perioperative complications:  No identified additional  risks      ICD-10-CM    1. Preop general physical exam Z01.818        RECOMMENDATIONS:   (Z01.818) Preop general physical exam  (primary encounter diagnosis)  Comment: We will request EKG and labs today and pending results you will be optimized for your upcoming surgery.  Plan: HIV Screening, EKG 12-lead complete w/read -         Clinics, CBC with platelets, Basic metabolic         panel            (Z11.4) Screening for HIV (human immunodeficiency virus)  Comment:   Plan:     (I82.A11) DVT of axillary vein, acute right (H)  Comment: Continue Xarelto until have been advised by vascular surgery to discontinue this.    Plan:     ADDENDUM 12/18/2018   Patient's surgery was postponed until 12/21 and he is still deemed to be medically optimized for his upcoming surgery.        Signed Electronically by: Rickie Ruiz MD, MD    Copy of this evaluation report is provided to requesting physician.    [facility] Preop Guidelines    Revised Cardiac Risk Index

## 2018-11-13 NOTE — LETTER
[place] [address]  [address]  [place], [place]  [address]  Tel: [phone]    November 16, 2018    Rickierajesh Lagos  [address] [facility] [address]        Dear Mr. Lagos,    All labs within normal limits     Congratulaions on your excellent results      Sincerely,    Rickie Ruiz MD / melvin     Results for orders placed or performed in visit on 11/13/18   HIV Screening   Result Value Ref Range    HIV Antigen Antibody Combo Nonreactive NR^Nonreactive       CBC with platelets   Result Value Ref Range    WBC 6.1 4.0 - 11.0 10e9/L    RBC Count 5.17 4.4 - 5.9 10e12/L    Hemoglobin 15.1 13.3 - 17.7 g/dL    Hematocrit 46.6 40.0 - 53.0 %    MCV 90 78 - 100 fl    MCH 29.2 26.5 - 33.0 pg    MCHC 32.4 31.5 - 36.5 g/dL    RDW 13.1 10.0 - 15.0 %    Platelet Count 251 150 - 450 10e9/L   Basic metabolic panel   Result Value Ref Range    Sodium 140 133 - 144 mmol/L    Potassium 3.6 3.4 - 5.3 mmol/L    Chloride 106 94 - 109 mmol/L    Carbon Dioxide 28 20 - 32 mmol/L    Anion Gap 6 3 - 14 mmol/L    Glucose 76 70 - 99 mg/dL    Urea Nitrogen 11 7 - 30 mg/dL    Creatinine 0.88 0.66 - 1.25 mg/dL    GFR Estimate >90 >60 mL/min/1.7m2    GFR Estimate If Black >90 >60 mL/min/1.7m2    Calcium 9.9 8.5 - 10.1 mg/dL

## 2018-11-14 ENCOUNTER — HOSPITAL ENCOUNTER (OUTPATIENT)
Facility: CLINIC | Age: 28
Discharge: HOME | End: 2018-11-14
Attending: RADIOLOGY | Admitting: RADIOLOGY
Payer: COMMERCIAL

## 2018-11-14 ENCOUNTER — APPOINTMENT (OUTPATIENT)
Dept: INTERVENTIONAL RADIOLOGY/VASCULAR | Facility: CLINIC | Age: 28
End: 2018-11-14
Attending: SURGERY
Payer: COMMERCIAL

## 2018-11-14 ENCOUNTER — TELEPHONE (OUTPATIENT)
Dept: OTHER | Facility: CLINIC | Age: 28
End: 2018-11-14

## 2018-11-14 VITALS
TEMPERATURE: 96 F | WEIGHT: 178 LBS | OXYGEN SATURATION: 97 % | DIASTOLIC BLOOD PRESSURE: 50 MMHG | HEART RATE: 77 BPM | SYSTOLIC BLOOD PRESSURE: 93 MMHG | RESPIRATION RATE: 16 BRPM | BODY MASS INDEX: 21.68 KG/M2 | HEIGHT: 76 IN

## 2018-11-14 DIAGNOSIS — G54.0 THORACIC OUTLET SYNDROME: ICD-10-CM

## 2018-11-14 LAB
ANION GAP SERPL CALCULATED.3IONS-SCNC: 6 MMOL/L (ref 3–14)
APTT PPP: 36 SEC (ref 22–37)
BUN SERPL-MCNC: 11 MG/DL (ref 7–30)
CALCIUM SERPL-MCNC: 9.9 MG/DL (ref 8.5–10.1)
CHLORIDE SERPL-SCNC: 106 MMOL/L (ref 94–109)
CO2 SERPL-SCNC: 28 MMOL/L (ref 20–32)
CREAT SERPL-MCNC: 0.86 MG/DL (ref 0.66–1.25)
CREAT SERPL-MCNC: 0.88 MG/DL (ref 0.66–1.25)
ERYTHROCYTE [DISTWIDTH] IN BLOOD BY AUTOMATED COUNT: 12.8 % (ref 10–15)
GFR SERPL CREATININE-BSD FRML MDRD: >90 ML/MIN/1.7M2
GFR SERPL CREATININE-BSD FRML MDRD: >90 ML/MIN/1.7M2
GLUCOSE SERPL-MCNC: 76 MG/DL (ref 70–99)
HCT VFR BLD AUTO: 46.7 % (ref 40–53)
HGB BLD-MCNC: 15.5 G/DL (ref 13.3–17.7)
HIV 1+2 AB+HIV1 P24 AG SERPL QL IA: NONREACTIVE
INR PPP: 1.08 (ref 0.86–1.14)
MCH RBC QN AUTO: 30 PG (ref 26.5–33)
MCHC RBC AUTO-ENTMCNC: 33.2 G/DL (ref 31.5–36.5)
MCV RBC AUTO: 90 FL (ref 78–100)
PLATELET # BLD AUTO: 212 10E9/L (ref 150–450)
POTASSIUM SERPL-SCNC: 3.6 MMOL/L (ref 3.4–5.3)
RBC # BLD AUTO: 5.17 10E12/L (ref 4.4–5.9)
SODIUM SERPL-SCNC: 140 MMOL/L (ref 133–144)
WBC # BLD AUTO: 4.6 10E9/L (ref 4–11)

## 2018-11-14 PROCEDURE — 27210886 ZZH ACCESSORY CR5

## 2018-11-14 PROCEDURE — 25000128 H RX IP 250 OP 636: Performed by: RADIOLOGY

## 2018-11-14 PROCEDURE — 85027 COMPLETE CBC AUTOMATED: CPT | Performed by: RADIOLOGY

## 2018-11-14 PROCEDURE — 25500064 ZZH RX 255 OP 636: Performed by: RADIOLOGY

## 2018-11-14 PROCEDURE — C1725 CATH, TRANSLUMIN NON-LASER: HCPCS

## 2018-11-14 PROCEDURE — 27210845 ZZH DEVICE INFLATION CR5

## 2018-11-14 PROCEDURE — 85610 PROTHROMBIN TIME: CPT | Performed by: RADIOLOGY

## 2018-11-14 PROCEDURE — 37248 TRLUML BALO ANGIOP 1ST VEIN: CPT

## 2018-11-14 PROCEDURE — 25000128 H RX IP 250 OP 636

## 2018-11-14 PROCEDURE — 82565 ASSAY OF CREATININE: CPT | Performed by: RADIOLOGY

## 2018-11-14 PROCEDURE — C1769 GUIDE WIRE: HCPCS

## 2018-11-14 PROCEDURE — 25000125 ZZHC RX 250

## 2018-11-14 PROCEDURE — 85730 THROMBOPLASTIN TIME PARTIAL: CPT | Performed by: RADIOLOGY

## 2018-11-14 PROCEDURE — 36415 COLL VENOUS BLD VENIPUNCTURE: CPT | Performed by: RADIOLOGY

## 2018-11-14 PROCEDURE — 27210906 ZZH KIT CR8

## 2018-11-14 PROCEDURE — 27210804 ZZH SHEATH CR3

## 2018-11-14 PROCEDURE — 40000863 ZZH STATISTIC RADIOLOGY XRAY, US, CT, MAR, NM

## 2018-11-14 PROCEDURE — 25000128 H RX IP 250 OP 636: Performed by: NURSE PRACTITIONER

## 2018-11-14 RX ORDER — NITROGLYCERIN 5 MG/ML
500 VIAL (ML) INTRAVENOUS EVERY 5 MIN PRN
Status: DISCONTINUED | OUTPATIENT
Start: 2018-11-14 | End: 2018-11-14 | Stop reason: HOSPADM

## 2018-11-14 RX ORDER — LIDOCAINE 40 MG/G
CREAM TOPICAL
Status: DISCONTINUED | OUTPATIENT
Start: 2018-11-14 | End: 2018-11-14 | Stop reason: HOSPADM

## 2018-11-14 RX ORDER — FENTANYL CITRATE 50 UG/ML
25-50 INJECTION, SOLUTION INTRAMUSCULAR; INTRAVENOUS EVERY 5 MIN PRN
Status: DISCONTINUED | OUTPATIENT
Start: 2018-11-14 | End: 2018-11-14 | Stop reason: HOSPADM

## 2018-11-14 RX ORDER — FENTANYL CITRATE 50 UG/ML
INJECTION, SOLUTION INTRAMUSCULAR; INTRAVENOUS
Status: COMPLETED
Start: 2018-11-14 | End: 2018-11-14

## 2018-11-14 RX ORDER — FLUMAZENIL 0.1 MG/ML
0.2 INJECTION, SOLUTION INTRAVENOUS
Status: DISCONTINUED | OUTPATIENT
Start: 2018-11-14 | End: 2018-11-14 | Stop reason: HOSPADM

## 2018-11-14 RX ORDER — NICOTINE POLACRILEX 4 MG
15-30 LOZENGE BUCCAL
Status: DISCONTINUED | OUTPATIENT
Start: 2018-11-14 | End: 2018-11-14 | Stop reason: HOSPADM

## 2018-11-14 RX ORDER — IOPAMIDOL 612 MG/ML
75 INJECTION, SOLUTION INTRAVASCULAR ONCE
Status: COMPLETED | OUTPATIENT
Start: 2018-11-14 | End: 2018-11-14

## 2018-11-14 RX ORDER — SODIUM CHLORIDE 9 MG/ML
INJECTION, SOLUTION INTRAVENOUS CONTINUOUS
Status: DISCONTINUED | OUTPATIENT
Start: 2018-11-14 | End: 2018-11-14 | Stop reason: HOSPADM

## 2018-11-14 RX ORDER — LIDOCAINE HYDROCHLORIDE 10 MG/ML
1-30 INJECTION, SOLUTION EPIDURAL; INFILTRATION; INTRACAUDAL; PERINEURAL
Status: COMPLETED | OUTPATIENT
Start: 2018-11-14 | End: 2018-11-14

## 2018-11-14 RX ORDER — ACETAMINOPHEN 500 MG
500 TABLET ORAL EVERY 6 HOURS PRN
Status: DISCONTINUED | OUTPATIENT
Start: 2018-11-14 | End: 2018-11-14

## 2018-11-14 RX ORDER — DEXTROSE MONOHYDRATE 25 G/50ML
25-50 INJECTION, SOLUTION INTRAVENOUS
Status: DISCONTINUED | OUTPATIENT
Start: 2018-11-14 | End: 2018-11-14 | Stop reason: HOSPADM

## 2018-11-14 RX ORDER — LIDOCAINE HYDROCHLORIDE 10 MG/ML
INJECTION, SOLUTION INFILTRATION; PERINEURAL
Status: COMPLETED
Start: 2018-11-14 | End: 2018-11-14

## 2018-11-14 RX ORDER — NALOXONE HYDROCHLORIDE 0.4 MG/ML
.1-.4 INJECTION, SOLUTION INTRAMUSCULAR; INTRAVENOUS; SUBCUTANEOUS
Status: DISCONTINUED | OUTPATIENT
Start: 2018-11-14 | End: 2018-11-14 | Stop reason: HOSPADM

## 2018-11-14 RX ORDER — ACETAMINOPHEN 500 MG
500 TABLET ORAL EVERY 6 HOURS PRN
Status: DISCONTINUED | OUTPATIENT
Start: 2018-11-14 | End: 2018-11-14 | Stop reason: HOSPADM

## 2018-11-14 RX ADMIN — FENTANYL CITRATE 50 MCG: 50 INJECTION, SOLUTION INTRAMUSCULAR; INTRAVENOUS at 13:44

## 2018-11-14 RX ADMIN — SODIUM CHLORIDE: 9 INJECTION, SOLUTION INTRAVENOUS at 11:00

## 2018-11-14 RX ADMIN — NITROGLYCERIN 500 MCG: 5 INJECTION, SOLUTION INTRAVENOUS at 14:06

## 2018-11-14 RX ADMIN — FENTANYL CITRATE 25 MCG: 50 INJECTION, SOLUTION INTRAMUSCULAR; INTRAVENOUS at 14:04

## 2018-11-14 RX ADMIN — HEPARIN SODIUM 10000 UNITS: 10000 INJECTION, SOLUTION INTRAVENOUS; SUBCUTANEOUS at 13:33

## 2018-11-14 RX ADMIN — LIDOCAINE HYDROCHLORIDE 7 ML: 10 INJECTION, SOLUTION INFILTRATION; PERINEURAL at 14:19

## 2018-11-14 RX ADMIN — FENTANYL CITRATE 50 MCG: 50 INJECTION, SOLUTION INTRAMUSCULAR; INTRAVENOUS at 13:41

## 2018-11-14 RX ADMIN — MIDAZOLAM HYDROCHLORIDE 1 MG: 1 INJECTION, SOLUTION INTRAMUSCULAR; INTRAVENOUS at 13:40

## 2018-11-14 RX ADMIN — MIDAZOLAM HYDROCHLORIDE 0.5 MG: 1 INJECTION, SOLUTION INTRAMUSCULAR; INTRAVENOUS at 14:04

## 2018-11-14 RX ADMIN — FENTANYL CITRATE 25 MCG: 50 INJECTION, SOLUTION INTRAMUSCULAR; INTRAVENOUS at 14:08

## 2018-11-14 RX ADMIN — IOPAMIDOL 35 ML: 612 INJECTION, SOLUTION INTRAVENOUS at 14:11

## 2018-11-14 RX ADMIN — HEPARIN SODIUM 10000 UNITS: 10000 INJECTION, SOLUTION INTRAVENOUS; SUBCUTANEOUS at 13:44

## 2018-11-14 RX ADMIN — SODIUM CHLORIDE: 9 INJECTION, SOLUTION INTRAVENOUS at 14:33

## 2018-11-14 RX ADMIN — FENTANYL CITRATE 50 MCG: 50 INJECTION, SOLUTION INTRAMUSCULAR; INTRAVENOUS at 13:52

## 2018-11-14 RX ADMIN — MIDAZOLAM HYDROCHLORIDE 1 MG: 1 INJECTION, SOLUTION INTRAMUSCULAR; INTRAVENOUS at 13:52

## 2018-11-14 RX ADMIN — MIDAZOLAM HYDROCHLORIDE 1 MG: 1 INJECTION, SOLUTION INTRAMUSCULAR; INTRAVENOUS at 13:44

## 2018-11-14 RX ADMIN — MIDAZOLAM HYDROCHLORIDE 0.5 MG: 1 INJECTION, SOLUTION INTRAMUSCULAR; INTRAVENOUS at 14:08

## 2018-11-14 RX ADMIN — NITROGLYCERIN 500 MCG: 5 INJECTION, SOLUTION INTRAVENOUS at 14:03

## 2018-11-14 RX ADMIN — LIDOCAINE HYDROCHLORIDE 7 ML: 10 INJECTION, SOLUTION EPIDURAL; INFILTRATION; INTRACAUDAL; PERINEURAL at 14:19

## 2018-11-14 NOTE — IP AVS SNAPSHOT
[place] [address]    [place] [address]    Phone:  [phone]                                       After Visit Summary   11/14/2018    Rickie Lagos    MRN: [id number]           After Visit Summary Signature Page     I have received my discharge instructions, and my questions have been answered. I have discussed any challenges I see with this plan with the nurse or doctor.    ..........................................................................................................................................  Patient/Patient Representative Signature      ..........................................................................................................................................  Patient Representative Print Name and Relationship to Patient    ..................................................               ................................................  Date                                   Time    ..........................................................................................................................................  Reviewed by Signature/Title    ...................................................              ..............................................  Date                                               Time          22EPIC Rev 08/18

## 2018-11-14 NOTE — DISCHARGE INSTRUCTIONS
Venogram Discharge Instructions - Brachial    After you go home:      Have an adult stay with you until tomorrow.    Drink extra fluids for 2 days.    You may resume your normal diet.    No smoking       For 24 hours - due to the sedation you received:    Relax and take it easy.    Do NOT make any important or legal decisions.    Do NOT drive or operate machines at home or at work.    Do NOT drink alcohol.    Care of Arm Puncture Site:      For the first 24 hrs - check the puncture site every 1-2 hours while awake.    Remove the bandaid after 24 hours. If there is minor oozing, apply another bandaid and remove it after 12 hours.    It is normal to have a small bruise or pea size lump at the site.    You may shower tomorrow.  Do NOT take a bath, or use a hot tub or pool for at least 3 days. Do NOT scrub the site. Do not use lotion or powder near the puncture site.     Activity:      For 2 days, do not use your hand or arm to support your weight (such as rising from a chair)     For 2 days, do not lift more than 5 pounds or exercise your arm (such as tennis, golf or bowling).    Bleeding:      If you start bleeding from the site in your arm, sit down and press firmly on the site for 10 minutes.     Once bleeding stops, keep your arm straight for 2 hours.     Call the [facility] as soon as you can.       Call 911 right away if you have heavy bleeding or bleeding that does not stop.      Medicines:          Take your medications, including blood thinners, unless your provider tells you not to.  If you have stopped any medicines, check with your provider about when to restart them.               Follow Up Appointments:      Follow up with [facility] as directed.    Call the clinic if:      You have increased pain or a large or growing hard lump around the site.    The site is red, swollen, hot or tender.    Blood or fluid is draining from the site.    You have chills or a fever greater than 101 F  (38 C).    Your arm feels numb, cool or changes color.    You have hives, a rash or unusual itching.    Any questions or concerns.    Other Instructions:      If you received a stent - carry your stent card with you at all times.      If you have questions or your original symptoms do not improve, call:            [facility] @ [phone]

## 2018-11-14 NOTE — PROGRESS NOTES
Interventional Radiology Pre-Procedure Sedation Assessment   Time of Assessment: 11:05 AM    Expected Level: Moderate Sedation    Indication: Sedation is required for the following type of Procedure: Right upper arm venogram with possible intervention with IV moderate sedation    Sedation and procedural consent: Risks, benefits and alternatives were discussed with Patient and Relative Parents    PO Intake: Appropriately NPO for procedure    ASA Class: Class 1 - HEALTHY PATIENT    Mallampati: Grade 1:  Soft palate, uvula, tonsillar pillars, and posterior pharyngeal wall visible    Lungs: Lungs Clear with good breath sounds bilaterally    Heart: Normal heart sounds and rate    History and physical reviewed and no updates needed. I have reviewed the lab findings, diagnostic data, medications, and the plan for sedation. I have determined this patient to be an appropriate candidate for the planned sedation and procedure and have reassessed the patient IMMEDIATELY PRIOR to sedation and procedure.    Duyen Lamas, VANNESSA CNP

## 2018-11-14 NOTE — TELEPHONE ENCOUNTER
VASCULAR SURGERY    Rickie Lagos came in for a repeat right arm venogram today.  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  He required a transaxillary first rib resection and scalenectomy with mobilization of the subclavian vein.  We did perform lytic therapy prior to this but he re-clotted despite anticoagulation prior to the surgery.  We thus went ahead with the surgery with no complications.  Following day he underwent mechanical thrombectomy and balloon venoplasty which is complicated by a delayed hemothorax requiring tube thoracostomy.    He has been on Xarelto.  Last venogram on 10/23/2018 revealed a recurrent occlusion of the subclavian vein opened with Possis and balloon venoplasty in good results.  We have kept him on Xarelto now 20 mg daily.  Plan to repeat venogram today.    Patient was examined prior to the procedure.  Axillary incision is healed well.  No arm swelling or dilated cutaneous veins.  He feels that his right arm is back to his baseline.      Venogram was performed by Dr. Barajas.  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  Collaterals which were quite extensive went around this.  He was able to successfully perform a balloon angioplasty of this very focal narrowing with good results and good flow.  We had discussed the possibility of a stent placement but would like to avoid this at all possible in a young patient such as Rickie.  With the final results and good response to venoplasty we did not place a stent.    Plan: Continue on Xarelto 20 mg daily.  Plan repeat venogram with Dr. Barajas in 4-5 weeks.       Kong Madden MD

## 2018-11-14 NOTE — PROGRESS NOTES
Pt arrived ~1020.  VSS, afebrile.  Pt parents are here and will be pts responsible adult.  IV started and labs sent.  Admission complete.  Pt a bit anxious but mom was helping relax pt.  The procedure was communicated that it will be delayed.  Offered free parking but family declined [place] they [place].

## 2018-11-14 NOTE — PROGRESS NOTES
Discharge instructions reviewed.  Questions answered.  Right arm site CDI.  +CMS.  Pt up and ambulating well and ronal PO.  Site remains CDI.  Pt ready for discharge.

## 2018-11-14 NOTE — IR NOTE
Interventional Radiology Intra-procedural Nursing Note    Patient Name: Rickie Lagos  Medical Record Number: [id number]  Today's Date: November 14, 2018    Start Time: 1339  End of procedure time: 1408  Procedure: right upper extremity venogram, angioplasty  Report given to: care suites Rn  Time pt departs:  1425  : n/a  Other Notes: patient tolerated well. 4mg Versed, 200mcg Fentanyl given for sedation. 1mg Nitroglycerin given IV through access wire, intra-procedure by Dr. Hernandez. VSS. Sr on monitor, 97% room air, respirations unlabored. Rue access site c/d/i with quick-clot gauze and tegaderm dressing.    Kimberly Vital RN

## 2018-11-14 NOTE — IP AVS SNAPSHOT
MRN:[id number]                      After Visit Summary   11/14/2018    Rickie Lagos    MRN: [id number]           Visit Information        Department      11/14/2018  9:42 AM [facility]          Review of your medicines      UNREVIEWED medicines. Ask your doctor about these medicines        Dose / Directions    DOCUSATE SODIUM PO        Refills:  0       ferrous sulfate 325 (65 Fe) MG tablet   Commonly known as:  IRON   Used for:  Anemia due to blood loss, acute        Dose:  325 mg   Take 1 tablet (325 mg) by mouth 2 times daily   Quantity:  100 tablet   Refills:  0       LORazepam 0.5 MG tablet   Commonly known as:  ATIVAN   Used for:  Situational anxiety        Dose:  0.5-1 mg   Take 1-2 tablets (0.5-1 mg) by mouth every 8 hours as needed (taken 1 hour before the procedure) Do not drive after taking this medication   Quantity:  5 tablet   Refills:  0       OXYCODONE HCL PO        Dose:  5 mg   Take 5 mg by mouth every 4 hours as needed   Refills:  0       rivaroxaban ANTICOAGULANT 20 MG Tabs tablet   Commonly known as:  XARELTO   Used for:  TOS (thoracic outlet syndrome)        Dose:  20 mg   Take 1 tablet (20 mg) by mouth daily (with dinner)   Quantity:  20 tablet   Refills:  0                Protect others around you: Learn how to safely use, store and throw away your medicines at www.disposemymeds.org.         Follow-ups after your visit         Care Instructions        Further instructions from your care team       Venogram Discharge Instructions - Brachial    After you go home:      Have an adult stay with you until tomorrow.    Drink extra fluids for 2 days.    You may resume your normal diet.    No smoking       For 24 hours - due to the sedation you received:    Relax and take it easy.    Do NOT make any important or legal decisions.    Do NOT drive or operate machines at home or at work.    Do NOT drink alcohol.    Care of Arm Puncture Site:      For the first  24 hrs - check the puncture site every 1-2 hours while awake.    Remove the bandaid after 24 hours. If there is minor oozing, apply another bandaid and remove it after 12 hours.    It is normal to have a small bruise or pea size lump at the site.    You may shower tomorrow.  Do NOT take a bath, or use a hot tub or pool for at least 3 days. Do NOT scrub the site. Do not use lotion or powder near the puncture site.     Activity:      For 2 days, do not use your hand or arm to support your weight (such as rising from a chair)     For 2 days, do not lift more than 5 pounds or exercise your arm (such as tennis, golf or bowling).    Bleeding:      If you start bleeding from the site in your arm, sit down and press firmly on the site for 10 minutes.     Once bleeding stops, keep your arm straight for 2 hours.     [facility] as soon as you can.       Call 911 right away if you have heavy bleeding or bleeding that does not stop.      Medicines:          Take your medications, including blood thinners, unless your provider tells you not to.  If you have stopped any medicines, check with your provider about when to restart them.               Follow Up Appointments:      Follow [facility] with [facility] as directed.    Call the clinic if:      You have increased pain or a large or growing hard lump around the site.    The site is red, swollen, hot or tender.    Blood or fluid is draining from the site.    You have chills or a fever greater than 101 F (38 C).    Your arm feels numb, cool or changes color.    You have hives, a rash or unusual itching.    Any questions or concerns.    Other Instructions:      If you received a stent - carry your stent card with you at all times.      If you have questions or your original symptoms do not improve, call:            [facility] @ [phone]          Information about OPIOIDS     PRESCRIPTION OPIOIDS: WHAT YOU NEED TO KNOW   We gave you an  opioid (narcotic) pain medicine. It is important to manage your pain, but opioids are not always the best choice. You should first try all the other options your care team gave you. Take this medicine for as short a time (and as few doses) as possible.    Some activities can increase your pain, such as bandage changes or therapy sessions. It may help to take your pain medicine 30 to 60 minutes before these activities. Reduce your stress by getting enough sleep, working on hobbies you enjoy and practicing relaxation or meditation. Talk to your care team about ways to manage your pain beyond prescription opioids.    These medicines have risks:    DO NOT drive when on new or higher doses of pain medicine. These medicines can affect your alertness and reaction times, and you could be arrested for driving under the influence (DUI). If you need to use opioids long-term, talk to your care team about driving.    DO NOT operate heavy machinery    DO NOT do any other dangerous activities while taking these medicines.    DO NOT drink any alcohol while taking these medicines.     If the opioid prescribed includes acetaminophen, DO NOT take with any other medicines that contain acetaminophen. Read all labels carefully. Look for the word  acetaminophen  or  Tylenol.  Ask your pharmacist if you have questions or are unsure.    You can get addicted to pain medicines, especially if you have a history of addiction (chemical, alcohol or substance dependence). Talk to your care team about ways to reduce this risk.    All opioids tend to cause constipation. Drink plenty of water and eat foods that have a lot of fiber, such as fruits, vegetables, prune juice, apple juice and high-fiber cereal. Take a laxative (Miralax, milk of magnesia, Colace, Senna) if you don t move your bowels at least every other day. Other side effects include upset stomach, sleepiness, dizziness, throwing up, tolerance (needing more of the medicine to have the  "same effect), physical dependence and slowed breathing.    Store your pills in a secure place, locked if possible. We will not replace any lost or stolen medicine. If you don t finish your medicine, please throw away (dispose) as directed by your pharmacist. The [place] [employer] has more information about safe disposal: https://www.pca.[facility].[place].us/living-green/managing-unwanted-medications         Additional Information About Your Visit        MyChart Information     [employer] lets you send messages to your doctor, view your test results, renew your prescriptions, schedule appointments and more. To sign up, go to [email or web address] . Click on \"Log in\" on the left side of the screen, which will take you to the Welcome page. Then click on \"Sign up Now\" on the right side of the page.     You will be asked to enter the access code listed below, as well as some personal information. Please follow the directions to create your username and password.     Your access code is: [email or web address]  Expires: 2019  1:27 PM     Your access code will  in 90 days. If you need help or a new code, please call your [facility] clinic or [phone].        Care EveryWhere ID     This is your Care EveryWhere ID. This could be used by other organizations to access your [facility] medical records  [id number]        Your Vitals Were     Blood Pressure Pulse Temperature Respirations Height Weight    93/50 77 96  F (35.6  C) (Oral) 16 1.93 m (6' 3.98\") 80.7 kg (178 lb)    Pulse Oximetry BMI (Body Mass Index)                97% 21.68 kg/m2           Primary Care Provider Office Phone # Fax #    Rickie Ruiz [phone]      Equal Access to Services     LIVIA DASH : Moris Marroquin, lizette hdz, lashonda edouard. So [facility] [phone].    ATENCIÓN: Si habla español, tiene a tariq disposición servicios gratuitos de asistencia " lingüísticaNichole Vazquez al [phone].    We comply with applicable federal civil rights laws and [place] laws. We do not discriminate on the basis of race, color, national origin, age, disability, sex, sexual orientation, or gender identity.            Thank you!     Thank you for choosing [facility] for your care. Our goal is always to provide you with excellent care. Hearing back from our patients is one way we can continue to improve our services. Please take a few minutes to complete the written survey that you may receive in the mail after you visit with us. Thank you!             Medication List: This is a list of all your medications and when to take them. Check marks below indicate your daily home schedule. Keep this list as a reference.      Medications           Morning Afternoon Evening Bedtime As Needed    DOCUSATE SODIUM PO                                ferrous sulfate 325 (65 Fe) MG tablet   Commonly known as:  IRON   Take 1 tablet (325 mg) by mouth 2 times daily                                LORazepam 0.5 MG tablet   Commonly known as:  ATIVAN   Take 1-2 tablets (0.5-1 mg) by mouth every 8 hours as needed (taken 1 hour before the procedure) Do not drive after taking this medication                                OXYCODONE HCL PO   Take 5 mg by mouth every 4 hours as needed                                rivaroxaban ANTICOAGULANT 20 MG Tabs tablet   Commonly known as:  XARELTO   Take 1 tablet (20 mg) by mouth daily (with dinner)

## 2018-11-14 NOTE — PROGRESS NOTES
Pt arrived back to the care suites ~1430.  VSS.  Right upper arm site CDI.  +CMS and pulses.  Denies any pain.  Bedrest until 1630.  Will cont to monitor.

## 2018-11-29 ENCOUNTER — TELEPHONE (OUTPATIENT)
Dept: OTHER | Facility: CLINIC | Age: 28
End: 2018-11-29

## 2018-11-29 NOTE — TELEPHONE ENCOUNTER
LM on cell phone asking Rickie to call to schedule Right Arm Venogram (told pt likely options are 12/18 or 12/21 since the request was for Dr. Hernandez to perform the Venogram). Kellen Ashton,

## 2018-12-06 ENCOUNTER — TELEPHONE (OUTPATIENT)
Dept: OTHER | Facility: CLINIC | Age: 28
End: 2018-12-06

## 2018-12-06 NOTE — TELEPHONE ENCOUNTER
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     12-6-18 Dr. Madden notes \"He has been on Xarelto.  Last venogram on 10/23/2018 revealed a recurrent occlusion of the subclavian vein opened with Possis and balloon venoplasty in good results.  We have kept him on Xarelto now 20 mg daily.  Plan to repeat venogram today.\"    Pt requesting more samples of Xarelto, as insurance has not been processed and will take a week and a half. Pt has 3 or 4 days of Xarelto left.     Jennifer Keane, GÓMEZN, RN    "

## 2018-12-06 NOTE — TELEPHONE ENCOUNTER
My co-worker scheduled Rickie for his procedure.    Type of surgery: RIGHT ARM VENOGRAM, POSSIBLE INTERVENTION  Location of surgery: [facility] OR and Other: [facility]   Date and time of surgery: 12/21/18 @ 11:00  Surgeon: DR. EMMANUEL  Pre-Op Appt Date: PT TO SCHEDULE WITH DR. CORONADO  Post-Op Appt Date: PT TO SCHEDULE   Packet sent out: Yes  Pre-cert/Authorization completed:  Yes  Date: 12/06/18  SCHEDULED BY SANDIP/ENTERED BY CARMINE. Kellen Ashton,

## 2018-12-10 NOTE — TELEPHONE ENCOUNTER
Per Dr. Maryanne Damian to have additional samples of Xarelto.   Patient education provided along with 4 bottles of Xarelto 20 mg tablets.  Patient verbalized understanding of medication and side effects to watch for.    Carline MAGAÑAN, RN

## 2018-12-18 ENCOUNTER — TELEPHONE (OUTPATIENT)
Dept: FAMILY MEDICINE | Facility: CLINIC | Age: 28
End: 2018-12-18

## 2018-12-18 NOTE — TELEPHONE ENCOUNTER
Dr. Ruiz:  You completed preop on 11/13/18 for a 11/14/18 procedure for this pt.  Procedure now moved out to  12/21/18, are you able to addend 11/13/18 visit?  If so, addend, and no further follow up needed. Surgery team will watch for update.  Giselle Cedeno RN

## 2018-12-20 ENCOUNTER — OFFICE VISIT (OUTPATIENT)
Dept: FAMILY MEDICINE | Facility: CLINIC | Age: 28
End: 2018-12-20
Payer: COMMERCIAL

## 2018-12-20 DIAGNOSIS — Z01.818 PREOP GENERAL PHYSICAL EXAM: Primary | ICD-10-CM

## 2018-12-20 PROCEDURE — 99207 ZZC NO CHARGE LOS: CPT | Performed by: NURSE PRACTITIONER

## 2018-12-20 NOTE — PROGRESS NOTES
[facility]  [address] [address]  [phone]  Dept: [phone]    PRE-OP EVALUATION:  Today's date: 2018    Rickie Lagos (: 1990) presents for pre-operative evaluation assessment as requested by Dr. Hernandez.  He requires evaluation and anesthesia risk assessment prior to undergoing surgery/procedure for treatment of Thoracic outlet syndrome .    Proposed Surgery/ Procedure: Venogram  Date of Surgery/ Procedure: 18  Time of Surgery/ Procedure: 1100  Hospital/Surgical Facility: [facility]  Fax number for surgical facility:  Primary Physician: Rickie Ruiz  Type of Anesthesia Anticipated: to be determined    Patient has a Health Care Directive or Living Will:  YES on file    1. NO - Do you have a history of heart attack, stroke, stent, bypass or surgery on an artery in the head, neck, heart or legs?  2. YES - DO YOU EVER HAVE ANY PAIN OR DISCOMFORT IN YOUR CHEST? ***  3. NO - Do you have a history of  Heart Failure?  4. NO - Are you troubled by shortness of breath when: walking on the level, up a slight hill or at night?  5. NO - Do you currently have a cold, bronchitis or other respiratory infection?  6. NO - Do you have a cough, shortness of breath or wheezing?  7. NO - Do you sometimes get pains in the calves of your legs when you walk?  8. YES - DO YOU OR ANYONE IN YOUR FAMILY HAVE PREVIOUS HISTORY OF BLOOD CLOTS? ***  9. NO - Do you or does anyone in your family have a serious bleeding problem such as prolonged bleeding following surgeries or cuts?  10. YES - HAVE YOU EVER HAD PROBLEMS WITH ANEMIA OR BEEN TOLD TO TAKE IRON PILLS? ***  11. NO - Have you had any abnormal blood loss such as black, tarry or bloody stools, or abnormal vaginal bleeding?  12. YES - HAVE YOU EVER HAD A BLOOD TRANSFUSION? ***  13. NO - Have you or any of your relatives ever had problems with anesthesia?  14. NO - Do you have sleep apnea, excessive snoring  or daytime drowsiness?  15. NO - Do you have any prosthetic heart valves?  16. NO - Do you have prosthetic joints?  17. NO - Is there any chance that you may be pregnant?      HPI:     HPI related to upcoming procedure: ***      {. Problems:685899}    MEDICAL HISTORY:     Patient Active Problem List    Diagnosis Date Noted     DVT of axillary vein, acute right (H) 09/26/2018     Priority: Medium      Past Medical History:   Diagnosis Date     Collapsed lung     following a rib ressection     DVT of axillary vein, acute right (H) 09/16/2018     Thoracic outlet syndrome 9/28/2018     TOS (thoracic outlet syndrome) 09/16/2018     Past Surgical History:   Procedure Laterality Date     HC TOOTH EXTRACTION W/FORCEP Bilateral     wisdom tooth extraction     THORACOSCOPY Right 10/4/2018    Procedure: THORACOSCOPY;  RIGHT VIDEO ASSISTED THORACOSCOPIC SURGERY, DRAINAGE OF HEMOTHORAX, REMOVAL OF INTRAPLEURAL CLOTS;  Surgeon: Sean Morelos MD;  Location:  OR     TRANSAXILLARY RESECT FIRST RIB Right 9/28/2018    Procedure: TRANSAXILLARY RESECT FIRST RIB;  RIGHT TRANSAXILLARY FIRST RIB RESECTION, SCALENECTOMY ;  Surgeon: Kong Madden MD;  Location:  OR     XR EXTREMITY VENOGRAM INJ RIGHT Right 09/16/2018     Current Outpatient Medications   Medication Sig Dispense Refill     DOCUSATE SODIUM PO        ferrous sulfate (IRON) 325 (65 Fe) MG tablet Take 1 tablet (325 mg) by mouth 2 times daily 100 tablet 0     LORazepam (ATIVAN) 0.5 MG tablet Take 1-2 tablets (0.5-1 mg) by mouth every 8 hours as needed (taken 1 hour before the procedure) Do not drive after taking this medication 5 tablet 0     OXYCODONE HCL PO Take 5 mg by mouth every 4 hours as needed       rivaroxaban ANTICOAGULANT (XARELTO) 15 MG TABS tablet Take 1 tablet (15 mg) by mouth 2 times daily (with meals) 42 tablet 0     rivaroxaban ANTICOAGULANT (XARELTO) 20 MG TABS tablet Take 1 tablet (20 mg) by mouth daily (with dinner) 20 tablet 0     OTC  "products: {OTC ANALGESICS:018196}    Allergies   Allergen Reactions     Amoxicillin Rash      Latex Allergy: {YES/NO WITH DEFAULT:851023::\"NO\"}    Social History     Tobacco Use     Smoking status: Current Every Day Smoker     Years: 6.00     Types: Other     Smokeless tobacco: Never Used     Tobacco comment: Patient vapes   Substance Use Topics     Alcohol use: Yes     Comment: very occasionally     History   Drug Use No       REVIEW OF SYSTEMS:   {ROS Preop Choices:434480}    EXAM:   There were no vitals taken for this visit.  {EXAM Preop Choices:056112}    DIAGNOSTICS:   {DIAGNOSTIC FOR PREOP:599234}    Recent Labs   Lab Test 11/14/18  1037 11/13/18  1431  10/05/18  0725 10/04/18  0757   HGB 15.5 15.1  --  8.4* 8.9*    251   < >  --  337   INR 1.08  --   --   --  1.06   NA  --  140  --  137 137   POTASSIUM  --  3.6  --  4.0 4.2   CR 0.86 0.88  --  0.75 0.77    < > = values in this interval not displayed.        IMPRESSION:   {PREOP REASONS:100558::\"Reason for surgery/procedure: ***\",\"Diagnosis/reason for consult: ***\"}    The proposed surgical procedure is considered {HIGH=major cardiovascular or procedures requiring prolonged anesthesia >4 hours or large fluid shifts;    INTERMEDIATE=abdominal, most orthopedic and intrathoracic surgery; LOW= endoscopy, cataract and breast surgery:756150} risk.    REVISED CARDIAC RISK INDEX  The patient has the following serious cardiovascular risks for perioperative complications such as (MI, PE, VFib and 3  AV Block):  {PREOP REVISED CARDIAC INDEX (RCI):566153:p:\"No serious cardiac risks\"}  INTERPRETATION: {REVISED CARDIAC RISK INTERPRETATION:180395}    The patient has the following additional risks for perioperative complications:  {Additional perioperative risks:987308:p:\"No identified additional risks\"}      ICD-10-CM    1. Preop general physical exam Z01.818        RECOMMENDATIONS:     {IMPORTANT - Conditions - complete carefully!!:010485}    {IMPORTANT - " "Medications:413563::\"--Patient is to take all scheduled medications on the day of surgery EXCEPT for modifications listed below.\"}    {IMPORTANT - Approval:075420:p:\"APPROVAL GIVEN to proceed with proposed procedure, without further diagnostic evaluation\"}       Signed Electronically by: VANNESSA Hood CNP    Copy of this evaluation report is provided to requesting physician.    [facility] Preop Guidelines    Revised Cardiac Risk Index  "

## 2018-12-20 NOTE — PROGRESS NOTES
Previous preop note was addended. Will no charge this visit. Pt is feeling well today without any concerns.   /70.   VANNESSA Mendiola CNP

## 2018-12-21 ENCOUNTER — TELEPHONE (OUTPATIENT)
Dept: OTHER | Facility: CLINIC | Age: 28
End: 2018-12-21

## 2018-12-21 ENCOUNTER — APPOINTMENT (OUTPATIENT)
Dept: INTERVENTIONAL RADIOLOGY/VASCULAR | Facility: CLINIC | Age: 28
End: 2018-12-21
Attending: SURGERY
Payer: COMMERCIAL

## 2018-12-21 ENCOUNTER — HOSPITAL ENCOUNTER (OUTPATIENT)
Facility: CLINIC | Age: 28
Discharge: HOME | End: 2018-12-21
Attending: RADIOLOGY | Admitting: RADIOLOGY
Payer: COMMERCIAL

## 2018-12-21 VITALS
OXYGEN SATURATION: 97 % | DIASTOLIC BLOOD PRESSURE: 65 MMHG | HEART RATE: 59 BPM | TEMPERATURE: 97.6 F | SYSTOLIC BLOOD PRESSURE: 108 MMHG | HEIGHT: 76 IN | RESPIRATION RATE: 12 BRPM | BODY MASS INDEX: 21.91 KG/M2 | WEIGHT: 179.9 LBS

## 2018-12-21 DIAGNOSIS — I82.A11 DVT OF AXILLARY VEIN, ACUTE RIGHT (H): Primary | ICD-10-CM

## 2018-12-21 DIAGNOSIS — I87.1 VENOUS THORACIC OUTLET SYNDROME OF RIGHT SUBCLAVIAN VEIN: ICD-10-CM

## 2018-12-21 LAB
APTT PPP: 36 SEC (ref 22–37)
CREAT SERPL-MCNC: 0.96 MG/DL (ref 0.66–1.25)
ERYTHROCYTE [DISTWIDTH] IN BLOOD BY AUTOMATED COUNT: 12.3 % (ref 10–15)
GFR SERPL CREATININE-BSD FRML MDRD: >90 ML/MIN/{1.73_M2}
HCT VFR BLD AUTO: 43.4 % (ref 40–53)
HGB BLD-MCNC: 15 G/DL (ref 13.3–17.7)
INR PPP: 1.1 (ref 0.86–1.14)
MCH RBC QN AUTO: 29.8 PG (ref 26.5–33)
MCHC RBC AUTO-ENTMCNC: 34.6 G/DL (ref 31.5–36.5)
MCV RBC AUTO: 86 FL (ref 78–100)
PLATELET # BLD AUTO: 219 10E9/L (ref 150–450)
RBC # BLD AUTO: 5.03 10E12/L (ref 4.4–5.9)
WBC # BLD AUTO: 5.2 10E9/L (ref 4–11)

## 2018-12-21 PROCEDURE — 25000128 H RX IP 250 OP 636: Performed by: RADIOLOGY

## 2018-12-21 PROCEDURE — 27210905 ZZH KIT CR7

## 2018-12-21 PROCEDURE — 40000853 ZZH STATISTIC ANGIOGRAM, STENT, VERTEBRO PLASTY

## 2018-12-21 PROCEDURE — 36005 INJECTION EXT VENOGRAPHY: CPT

## 2018-12-21 PROCEDURE — 25500064 ZZH RX 255 OP 636: Performed by: RADIOLOGY

## 2018-12-21 PROCEDURE — 85027 COMPLETE CBC AUTOMATED: CPT | Performed by: RADIOLOGY

## 2018-12-21 PROCEDURE — 27210886 ZZH ACCESSORY CR5

## 2018-12-21 PROCEDURE — 82565 ASSAY OF CREATININE: CPT | Performed by: RADIOLOGY

## 2018-12-21 PROCEDURE — 25000125 ZZHC RX 250: Performed by: RADIOLOGY

## 2018-12-21 PROCEDURE — 75820 VEIN X-RAY ARM/LEG: CPT | Mod: RT

## 2018-12-21 PROCEDURE — 85730 THROMBOPLASTIN TIME PARTIAL: CPT | Performed by: RADIOLOGY

## 2018-12-21 PROCEDURE — 85610 PROTHROMBIN TIME: CPT | Performed by: RADIOLOGY

## 2018-12-21 PROCEDURE — 36415 COLL VENOUS BLD VENIPUNCTURE: CPT

## 2018-12-21 RX ORDER — ACETAMINOPHEN 500 MG
500 TABLET ORAL EVERY 6 HOURS PRN
Status: DISCONTINUED | OUTPATIENT
Start: 2018-12-21 | End: 2018-12-21 | Stop reason: HOSPADM

## 2018-12-21 RX ORDER — SODIUM CHLORIDE 9 MG/ML
INJECTION, SOLUTION INTRAVENOUS CONTINUOUS
Status: DISCONTINUED | OUTPATIENT
Start: 2018-12-21 | End: 2018-12-21 | Stop reason: HOSPADM

## 2018-12-21 RX ORDER — NICOTINE POLACRILEX 4 MG
15-30 LOZENGE BUCCAL
Status: DISCONTINUED | OUTPATIENT
Start: 2018-12-21 | End: 2018-12-21 | Stop reason: HOSPADM

## 2018-12-21 RX ORDER — DIPHENHYDRAMINE HYDROCHLORIDE 50 MG/ML
50 INJECTION INTRAMUSCULAR; INTRAVENOUS ONCE
Status: COMPLETED | OUTPATIENT
Start: 2018-12-21 | End: 2018-12-21

## 2018-12-21 RX ORDER — DEXTROSE MONOHYDRATE 25 G/50ML
25-50 INJECTION, SOLUTION INTRAVENOUS
Status: DISCONTINUED | OUTPATIENT
Start: 2018-12-21 | End: 2018-12-21 | Stop reason: HOSPADM

## 2018-12-21 RX ORDER — DIPHENHYDRAMINE HYDROCHLORIDE 50 MG/ML
INJECTION INTRAMUSCULAR; INTRAVENOUS
Status: DISCONTINUED
Start: 2018-12-21 | End: 2018-12-21 | Stop reason: HOSPADM

## 2018-12-21 RX ORDER — LIDOCAINE HYDROCHLORIDE 10 MG/ML
1-30 INJECTION, SOLUTION EPIDURAL; INFILTRATION; INTRACAUDAL; PERINEURAL
Status: COMPLETED | OUTPATIENT
Start: 2018-12-21 | End: 2018-12-21

## 2018-12-21 RX ORDER — FENTANYL CITRATE 50 UG/ML
25-50 INJECTION, SOLUTION INTRAMUSCULAR; INTRAVENOUS EVERY 5 MIN PRN
Status: DISCONTINUED | OUTPATIENT
Start: 2018-12-21 | End: 2018-12-21 | Stop reason: HOSPADM

## 2018-12-21 RX ORDER — LIDOCAINE HYDROCHLORIDE 10 MG/ML
INJECTION, SOLUTION INFILTRATION; PERINEURAL
Status: DISCONTINUED
Start: 2018-12-21 | End: 2018-12-21 | Stop reason: HOSPADM

## 2018-12-21 RX ORDER — FENTANYL CITRATE 50 UG/ML
INJECTION, SOLUTION INTRAMUSCULAR; INTRAVENOUS
Status: DISCONTINUED
Start: 2018-12-21 | End: 2018-12-21 | Stop reason: HOSPADM

## 2018-12-21 RX ORDER — IOPAMIDOL 612 MG/ML
50 INJECTION, SOLUTION INTRAVASCULAR ONCE
Status: COMPLETED | OUTPATIENT
Start: 2018-12-21 | End: 2018-12-21

## 2018-12-21 RX ORDER — FLUMAZENIL 0.1 MG/ML
0.2 INJECTION, SOLUTION INTRAVENOUS
Status: DISCONTINUED | OUTPATIENT
Start: 2018-12-21 | End: 2018-12-21 | Stop reason: HOSPADM

## 2018-12-21 RX ORDER — NALOXONE HYDROCHLORIDE 0.4 MG/ML
.1-.4 INJECTION, SOLUTION INTRAMUSCULAR; INTRAVENOUS; SUBCUTANEOUS
Status: DISCONTINUED | OUTPATIENT
Start: 2018-12-21 | End: 2018-12-21 | Stop reason: HOSPADM

## 2018-12-21 RX ORDER — LIDOCAINE 40 MG/G
CREAM TOPICAL
Status: DISCONTINUED | OUTPATIENT
Start: 2018-12-21 | End: 2018-12-21 | Stop reason: HOSPADM

## 2018-12-21 RX ADMIN — IOPAMIDOL 10 ML: 612 INJECTION, SOLUTION INTRAVENOUS at 12:14

## 2018-12-21 RX ADMIN — FENTANYL CITRATE 50 MCG: 50 INJECTION, SOLUTION INTRAMUSCULAR; INTRAVENOUS at 12:02

## 2018-12-21 RX ADMIN — MIDAZOLAM HYDROCHLORIDE 1 MG: 1 INJECTION, SOLUTION INTRAMUSCULAR; INTRAVENOUS at 12:06

## 2018-12-21 RX ADMIN — MIDAZOLAM HYDROCHLORIDE 1 MG: 1 INJECTION, SOLUTION INTRAMUSCULAR; INTRAVENOUS at 12:02

## 2018-12-21 RX ADMIN — FENTANYL CITRATE 50 MCG: 50 INJECTION, SOLUTION INTRAMUSCULAR; INTRAVENOUS at 12:07

## 2018-12-21 RX ADMIN — HEPARIN SODIUM 10000 UNITS: 10000 INJECTION, SOLUTION INTRAVENOUS; SUBCUTANEOUS at 12:14

## 2018-12-21 RX ADMIN — DIPHENHYDRAMINE HYDROCHLORIDE 50 MG: 50 INJECTION, SOLUTION INTRAMUSCULAR; INTRAVENOUS at 12:02

## 2018-12-21 RX ADMIN — LIDOCAINE HYDROCHLORIDE 5 ML: 10 INJECTION, SOLUTION INFILTRATION; PERINEURAL at 12:13

## 2018-12-21 ASSESSMENT — MIFFLIN-ST. JEOR: SCORE: 1897.52

## 2018-12-21 NOTE — IP AVS SNAPSHOT
[place] [address] [place] [address]  Phone:  [phone]                                    After Visit Summary   12/21/2018    Rickie Lagos    MRN: [id number]           After Visit Summary Signature Page    I have received my discharge instructions, and my questions have been answered. I have discussed any challenges I see with this plan with the nurse or doctor.    ..........................................................................................................................................  Patient/Patient Representative Signature      ..........................................................................................................................................  Patient Representative Print Name and Relationship to Patient    ..................................................               ................................................  Date                                   Time    ..........................................................................................................................................  Reviewed by Signature/Title    ...................................................              ..............................................  Date                                               Time          22EPIC Rev 08/18

## 2018-12-21 NOTE — DISCHARGE INSTRUCTIONS
Peripheral Angiogram Discharge Instructions - Brachial    After you go home:      Have an adult stay with you until tomorrow.    Drink extra fluids for 2 days.    You may resume your normal diet.    No smoking       For 24 hours - due to the sedation you received:    Relax and take it easy.    Do NOT make any important or legal decisions.    Do NOT drive or operate machines at home or at work.    Do NOT drink alcohol.    Care of Arm Puncture Site:      For the first 24 hrs - check the puncture site every 1-2 hours while awake.    Remove the bandaid after 24 hours. If there is minor oozing, apply another bandaid and remove it after 12 hours.    It is normal to have a small bruise or pea size lump at the site.    You may shower tomorrow. Do NOT take a bath, or use a hot tub or pool for at least 3 days. Do NOT scrub the site. Do not use lotion or powder near the puncture site.     Activity:      For 2 days, do not use your hand or arm to support your weight (such as rising from a chair)     For 2 days, do not lift more than 5 pounds or exercise your arm (such as tennis, golf or bowling).    Bleeding:      If you start bleeding from the site in your arm, sit down and press firmly on/above the site for 10 minutes.     Once bleeding stops, keep your arm straight for 2 hours.     Call [facility] as soon as you can.       Call 911 right away if you have heavy bleeding or bleeding that does not stop.      Medicines:      If you are taking an antiplatelet medication such as Plavix, do not stop taking it until you talk to your provider.       Take your medications, including blood thinners, unless your provider tells you not to.      If you have stopped any medicines, check with your provider about when to restart them.               Follow Up Appointments:      Follow [facility] with [facility] as directed.    Call the clinic if:      You have increased pain or a large or growing hard lump around the  site.    The site is red, swollen, hot or tender.    Blood or fluid is draining from the site.    You have chills or a fever greater than 101 F (38 C).    Your arm feels numb, cool or changes color.    You have hives, a rash or unusual itching.    Any questions or concerns.        If you have questions or your original symptoms do not improve, call:             [facility] @ [phone]

## 2018-12-21 NOTE — PROGRESS NOTES
Consent yet to be signed.    IV started and labs pending       Pulses charted.  Patient is accompanied by Maximo (parents) [phone]  Pre procedure plan of care reviewed with patient prior to procedure. All questions answered and patient verbalizes acceptance of plan.

## 2018-12-21 NOTE — PROGRESS NOTES
Family states they have existing appointment at [facility] today at 1500.  Script for Plavix phoned to this clinics pharmacy for later pickup today.

## 2018-12-21 NOTE — IP AVS SNAPSHOT
MRN:[id number]                      After Visit Summary   12/21/2018    Rickie Lagos    MRN: [id number]           Visit Information        Department      12/21/2018  9:46 AM [facility]          Review of your medicines      UNREVIEWED medicines. Ask your doctor about these medicines       Dose / Directions   rivaroxaban ANTICOAGULANT 20 MG Tabs tablet  Commonly known as:  XARELTO  Used for:  TOS (thoracic outlet syndrome)      Dose:  20 mg  Take 1 tablet (20 mg) by mouth daily (with dinner)  Quantity:  20 tablet  Refills:  0              Protect others around you: Learn how to safely use, store and throw away your medicines at www.disposemymeds.org.       Follow-ups after your visit       Care Instructions       Further instructions from your care team       Peripheral Angiogram Discharge Instructions - Brachial    After you go home:      Have an adult stay with you until tomorrow.    Drink extra fluids for 2 days.    You may resume your normal diet.    No smoking       For 24 hours - due to the sedation you received:    Relax and take it easy.    Do NOT make any important or legal decisions.    Do NOT drive or operate machines at home or at work.    Do NOT drink alcohol.    Care of Arm Puncture Site:      For the first 24 hrs - check the puncture site every 1-2 hours while awake.    Remove the bandaid after 24 hours. If there is minor oozing, apply another bandaid and remove it after 12 hours.    It is normal to have a small bruise or pea size lump at the site.    You may shower tomorrow. Do NOT take a bath, or use a hot tub or pool for at least 3 days. Do NOT scrub the site. Do not use lotion or powder near the puncture site.     Activity:      For 2 days, do not use your hand or arm to support your weight (such as rising from a chair)     For 2 days, do not lift more than 5 pounds or exercise your arm (such as tennis, golf or bowling).    Bleeding:      If you start  "bleeding from the site in your arm, sit down and press firmly on/above the site for 10 minutes.     Once bleeding stops, keep your arm straight for 2 hours.     Call the [facility] as soon as you can.       Call 911 right away if you have heavy bleeding or bleeding that does not stop.      Medicines:      If you are taking an antiplatelet medication such as Plavix, do not stop taking it until you talk to your provider.       Take your medications, including blood thinners, unless your provider tells you not to.      If you have stopped any medicines, check with your provider about when to restart them.               Follow Up Appointments:      Follow up with [facility] as directed.    Call the clinic if:      You have increased pain or a large or growing hard lump around the site.    The site is red, swollen, hot or tender.    Blood or fluid is draining from the site.    You have chills or a fever greater than 101 F (38 C).    Your arm feels numb, cool or changes color.    You have hives, a rash or unusual itching.    Any questions or concerns.        If you have questions or your original symptoms do not improve, call:             [facility] @ [phone]          Additional Information About Your Visit       [employer] Information    [employer] lets you send messages to your doctor, view your test results, renew your prescriptions, schedule appointments and more. To sign up, go to [email or web address] . Click on \"Log in\" on the left side of the screen, which will take you to the Welcome page. Then click on \"Sign up Now\" on the right side of the page.     You will be asked to enter the access code listed below, as well as some personal information. Please follow the directions to create your username and password.     Your access code is: [email or web address]  Expires: 2019  1:27 PM     Your access code will  in 90 days. If you need help or a new code, please call your [place] " "clinic or [phone].       Care EveryWhere ID    This is your Care EveryWhere ID. This could be used by other organizations to access your [facility] medical records  [id number]       Your Vitals Were     Blood Pressure   110/70          Pulse   49            Temperature   97.6  F (36.4  C) (Oral)          Respirations   11          Height   1.93 m (6' 4\")             Weight   81.6 kg (179 lb 14.4 oz)    Pulse Oximetry   99%    BMI (Body Mass Index)   21.90 kg/m           Primary Care Provider Office Phone # Fax #    Rickie Ruiz [phone]      Equal Access to Services    LIVIA DASH : Hadii erika macdonald hadnuryo Sozenaida, waaxda luqadaha, qaybta kaalmada adesintiayada, lashonda h[place] . So [facility] [phone].    ATENCIÓN: Si habla español, tiene a tariq disposición servicios gratuitos de asistencia lingüística. Llame al [phone].    We comply with applicable federal civil rights laws and [place] laws. We do not discriminate on the basis of race, color, national origin, age, disability, sex, sexual orientation, or gender identity.           Thank you!    Thank you for choosing [facility] for your care. Our goal is always to provide you with excellent care. Hearing back from our patients is one way we can continue to improve our services. Please take a few minutes to complete the written survey that you may receive in the mail after you visit with us. Thank you!            Medication List      ASK your doctor about these medications          Morning Afternoon Evening Bedtime As Needed    rivaroxaban ANTICOAGULANT 20 MG Tabs tablet  Commonly known as:  XARELTO  Take 1 tablet (20 mg) by mouth daily (with dinner)                       "

## 2018-12-21 NOTE — DISCHARGE SUMMARY
Patient discharged to home at 1400 - 12/21/18.  Medication regimen discussed with patient and patient verbalizes understanding.  Diet and activity and wound care discussed with patient.  No questions at this time.

## 2018-12-21 NOTE — IR NOTE
Interventional Radiology Intra-procedural Nursing Note    Patient Name: Rickie Lagos  Medical Record Number: [id number]  Today's Date: December 21, 2018    Start Time: 1154  End of procedure time: 1212  Procedure: Right upper extremity venogram   Report given to: RN in careites  Time pt departs:  1122  :     Other Notes:   Pt transferred to IR table. Prepped and draped appropriately. Monitoring equipment applied. NC applied. No complaints of pain at this time. Timeout recorded.  RIght arm CDI, soft covered with a tegaderm and gauze.   AUGUSTA BUSTOS

## 2018-12-21 NOTE — TELEPHONE ENCOUNTER
[facility]    Rickie Lagos came today with his parents for a follow-up right arm venogram with Dr. Barajas of interventional radiology.  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  He is undergone a right transaxillary first rib resection scalenectomy along with mobilization subclavian vein.  Follow-up treatment was complicated by bleeding from the subclavian vein that had been angioplasty requiring chest tube drainage and blood transfusion.      He has had done well since that time.  He has been on Xarelto 20 mg daily.  He has had no recurrent arm swelling.    His last venogram was on 11/14/2018.  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      Hospital course: Dr. Barajas repeated the right arm venogram.  This showed that the axillary and subclavian veins were widely patent.  No evidence of recurrent stenosis in the medial aspect the subclavian vein that had been angioplastied on 11/14/2018.      Patient tolerated procedure quite well.  He held his Xarelto yesterday but restart 20 mg daily.  We plan to continue the Xarelto for approximately 2 months.  I will see him in the office at that time for a venous duplex and if patency is confirmed Xarelto will be discontinued.     Kong Madden MD

## 2019-01-08 ENCOUNTER — NURSE TRIAGE (OUTPATIENT)
Dept: NURSING | Facility: CLINIC | Age: 29
End: 2019-01-08

## 2019-01-08 NOTE — TELEPHONE ENCOUNTER
"Patient calls with a medication question.  States his current prescription bottle states   \" Xarelto 15 mg tabs take 1 po BID with meals # 42.\"  Caller requests clarification on the prescription.  States he has taken his Xarelto today as prescribed on 11/1/18.     States he has been taking Xarelto daily as prescribed on 11/1/18:  rivaroxaban ANTICOAGULANT (XARELTO) 20 MG TABS tablet 20 tablet 0 11/1/2018  --   Sig - Route: Take 1 tablet (20 mg) by mouth daily (with dinner) - Oral   Sent to pharmacy as: rivaroxaban ANTICOAGULANT (XARELTO) 20 MG TABS tablet     Protocol-  Medication Question Call- Adult  Care advice reviewed.   Disposition- Call Provider within 24 hours  Caller states understanding of the recommended disposition.  Caller states he prefers to follow up with Pharmacy now and the [facility] tomorrow.      Caller plans to contact the Pharmacy today ([phone]) to review the Xarelto  prescription  from 11/1/19.   Caller also plans to contact the [facility] tomorrow 1/9/19 ([phone]) to follow up.   Advised to call back if further questions or concerns.     LYSSA Knox RN  [facility]     Reason for Disposition    Caller has NON-URGENT medication question about med that PCP prescribed and triager unable to answer question    Protocols used: MEDICATION QUESTION CALL-ADULT-      "

## 2019-01-29 DIAGNOSIS — G54.0 TOS (THORACIC OUTLET SYNDROME): ICD-10-CM

## 2019-01-29 NOTE — TELEPHONE ENCOUNTER
Requested Prescriptions   Pending Prescriptions Disp Refills     rivaroxaban ANTICOAGULANT (XARELTO) 20 MG TABS tablet 20 tablet 0     Sig: Take 1 tablet (20 mg) by mouth daily (with dinner)    There is no refill protocol information for this order        Last Written Prescription Date:  11/1/18  Last Fill Quantity: 20,  # refills: 0   Last office visit: No previous visit found with prescribing provider:   Future Office Visit:   Next 5 appointments (look out 90 days)    Feb 07, 2019  4:15 PM CST  Return Visit with Kong Madden MD  [facility] ([facility]) [address] [place]  [facility] [id number] [phone]

## 2019-02-14 ENCOUNTER — OFFICE VISIT (OUTPATIENT)
Dept: OTHER | Facility: CLINIC | Age: 29
End: 2019-02-14
Attending: SURGERY
Payer: COMMERCIAL

## 2019-02-14 ENCOUNTER — HOSPITAL ENCOUNTER (OUTPATIENT)
Dept: ULTRASOUND IMAGING | Facility: CLINIC | Age: 29
Discharge: HOME | End: 2019-02-14
Attending: SURGERY | Admitting: SURGERY
Payer: COMMERCIAL

## 2019-02-14 VITALS — DIASTOLIC BLOOD PRESSURE: 96 MMHG | HEART RATE: 75 BPM | SYSTOLIC BLOOD PRESSURE: 137 MMHG

## 2019-02-14 DIAGNOSIS — I82.A11 DVT OF AXILLARY VEIN, ACUTE RIGHT (H): ICD-10-CM

## 2019-02-14 DIAGNOSIS — G54.0 TOS (THORACIC OUTLET SYNDROME): Primary | ICD-10-CM

## 2019-02-14 PROCEDURE — 99213 OFFICE O/P EST LOW 20 MIN: CPT | Mod: ZP | Performed by: SURGERY

## 2019-02-14 PROCEDURE — G0463 HOSPITAL OUTPT CLINIC VISIT: HCPCS

## 2019-02-14 PROCEDURE — 93971 EXTREMITY STUDY: CPT | Mod: RT

## 2019-02-14 NOTE — LETTER
[facility] [place] [address] [place] [address]  [place] [address]  Phone: [phone]  Fax: [phone]    2019       Re: Rickie Lagos - 1990    Rickie Lagos returns for vascular follow-up.  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  This is treated initially with lytic therapy along with angioplasty and Possis.  Residual extrinsic compression was noted at the thoracic inlet.     He underwent a right transaxillary first rib resection, scalenectomy, mobilizations subclavian vein on 2018.  He was restarted on the Xarelto.  Unfortunately developed a right hemothorax requiring tube thoracostomy following venoplasty and Possis on 2018.  This was treated with tube thoracoscopy that did not remove all the organized clot.  Dr. Morelos then performed a VATS to remove the organized thrombuswith resolution.  He was eventually restarted on Xarelto.     Venogram on 10/23/2018 revealed a recurrent occlusion of the subclavian vein open with Possis and balloon venoplasty.  Continue on Xarelto.  Venogram on 2018 revealed a stenosis within the subclavian vein that was treated successfully with angioplasty.  Again restarted on Xarelto.  Final venogram on 2018 revealing widely patent axillary and subclavian veins with no recurrent stenosis.  Again continue on Xarelto 20 mg daily and comes today for follow-up venous duplex ultrasound to evaluate the left axillary and subclavian vein.     Overall he is done very well.  Still gradually regaining his energy but doing all activities.  Has approximately 4 Xarelto pills left.  He has had no problems with his left side including no neurogenic TOS type symptoms.      Exam: Alert and appropriate.  Normal affect.  Very comfortable.              Blood pressure 137/96.  Pulse 75.              Multiple healed incisions on the right side from the various procedures  with mild             keloid formation.              No arm swelling or dilated cutaneous veins.              Clear breath sounds bilaterally                   Venous duplex today reveals a widely patent left axilla and subclavian vein with no evidence of narrowing.     I did review with the patient the multiple chest x-rays taken postoperatively including his hemothorax and following the VATS procedure     Impression: Doing very well following treatment for symptomatic vasogenic right TOS.  Vein is widely patent with no narrowing.  We will thus discontinue his Xarelto when the present prescription is finished.  We will continue indefinitely on baby aspirin.  He will follow-up with me if he notes any swelling or other concerns.     Kong Madden MD

## 2019-02-14 NOTE — NURSING NOTE
"Rickie Lagos is a 28 year old male who presents for:  Chief Complaint   Patient presents with     RECHECK     Right venous u/s (VHC 1:00; WRO 1:45) *History of right first rib resection on 9/28/18; s/p venogram on 11/14/18 and 12/21/18; 2 month follow up to 12/21/18 fistulogram; pt sees Dr. Madden        Vitals:    Vitals:    02/14/19 1328   BP: (!) 137/96   BP Location: Right arm   Patient Position: Chair   Cuff Size: Adult Regular   Pulse: 75       BMI:  Estimated body mass index is 21.9 kg/m  as calculated from the following:    Height as of 12/21/18: 6' 4\" (1.93 m).    Weight as of 12/21/18: 179 lb 14.4 oz (81.6 kg).    Pain Score:  Data Unavailable        Kiki Young MA    "

## 2019-02-14 NOTE — PROGRESS NOTES
[facility] [facility]    Rickie Lagos returns for vascular follow-up.  He has vasogenic right TOS with DVT of the right axillary and subclavian vein in September 2018.  This is treated initially with lytic therapy along with angioplasty and Possis.  Residual extrinsic compression was noted at the thoracic inlet.    He underwent a right transaxillary first rib resection, scalenectomy, mobilizations subclavian vein on 9/28/2018.  He was restarted on the Xarelto.  Unfortunately developed a right hemothorax requiring tube thoracostomy following venoplasty and Possis on 9/29/2018.  This was treated with tube thoracoscopy that did not remove all the organized clot.  Dr. Morelos then performed a VATS to remove the organized thrombuswith resolution.  He was eventually restarted on Xarelto.    Venogram on 10/23/2018 revealed a recurrent occlusion of the subclavian vein open with Possis and balloon venoplasty.  Continue on Xarelto.  Venogram on 11/14/2018 revealed a stenosis within the subclavian vein that was treated successfully with angioplasty.  Again restarted on Xarelto.  Final venogram on 12/21/2018 revealing widely patent axillary and subclavian veins with no recurrent stenosis.  Again continue on Xarelto 20 mg daily and comes today for follow-up venous duplex ultrasound to evaluate the left axillary and subclavian vein.    Overall he is done very well.  Still gradually regaining his energy but doing all activities.  Has approximately 4 Xarelto pills left.  He has had no problems with his left side including no neurogenic TOS type symptoms.      Exam: Alert and appropriate.  Normal affect.  Very comfortable.              Blood pressure 137/96.  Pulse 75.              Multiple healed incisions on the right side from the various procedures with mild                            keloid formation.              No arm swelling or dilated cutaneous veins.              Clear breath sounds bilaterally                        Venous duplex today reveals a widely patent left axilla and subclavian vein with no evidence of narrowing.     I did review with the patient the multiple chest x-rays taken postoperatively including his hemothorax and following the VATS procedure    Impression: Doing very well following treatment for symptomatic vasogenic right TOS.  Vein is widely patent with no narrowing.  We will thus discontinue his Xarelto when the present prescription is finished.  We will continue indefinitely on baby aspirin.  He will follow-up with me if he notes any swelling or other concerns.     Kong Madden MD     CC  Patient Care Team:  Rickie Ruiz MD as PCP - General (Internal Medicine)  Rickie Ruiz MD as PCP - Assigned PCP

## 2019-05-20 ENCOUNTER — OFFICE VISIT (OUTPATIENT)
Dept: URGENT CARE | Facility: URGENT CARE | Age: 29
End: 2019-05-20
Payer: COMMERCIAL

## 2019-05-20 VITALS
OXYGEN SATURATION: 97 % | TEMPERATURE: 97.2 F | HEART RATE: 92 BPM | SYSTOLIC BLOOD PRESSURE: 115 MMHG | DIASTOLIC BLOOD PRESSURE: 78 MMHG

## 2019-05-20 DIAGNOSIS — J06.9 URI WITH COUGH AND CONGESTION: Primary | ICD-10-CM

## 2019-05-20 PROCEDURE — 99213 OFFICE O/P EST LOW 20 MIN: CPT | Performed by: PHYSICIAN ASSISTANT

## 2019-05-20 RX ORDER — PSEUDOEPHEDRINE HCL 120 MG/1
120 TABLET, FILM COATED, EXTENDED RELEASE ORAL EVERY 12 HOURS
COMMUNITY

## 2019-05-20 RX ORDER — ASPIRIN 81 MG/1
81 TABLET, CHEWABLE ORAL DAILY
COMMUNITY

## 2019-05-20 NOTE — PROGRESS NOTES
Subjective     Rickie Lagos is a 28 year old male who presents to clinic today for the following health issues:    HPI   RESPIRATORY SYMPTOMS      Duration: 5 days    Description  Rhinorrhea, sneezing, cough, post nasal drip, possible fever early on - better now  Started with a sore throat  Some headaches and sinus pressure    Severity: moderate    Accompanying signs and symptoms: None    History (predisposing factors):  vape only    Precipitating or alleviating factors: None    Therapies tried and outcome:  Sudafed, Nyquil, cetrizine     No history of pneumonia    Patient Active Problem List   Diagnosis     DVT of axillary vein, acute right (H)     Past Surgical History:   Procedure Laterality Date     HC TOOTH EXTRACTION W/FORCEP Bilateral     wisdom tooth extraction     IR UPPER EXTREMITY VENOGRAM RIGHT  12/21/2018     THORACOSCOPY Right 10/4/2018    Procedure: THORACOSCOPY;  RIGHT VIDEO ASSISTED THORACOSCOPIC SURGERY, DRAINAGE OF HEMOTHORAX, REMOVAL OF INTRAPLEURAL CLOTS;  Surgeon: Sean Morelos MD;  Location: [facility] OR     TRANSAXILLARY RESECT FIRST RIB Right 9/28/2018    Procedure: TRANSAXILLARY RESECT FIRST RIB;  RIGHT TRANSAXILLARY FIRST RIB RESECTION, SCALENECTOMY ;  Surgeon: Kong Madden MD;  Location: [facility] OR     XR EXTREMITY VENOGRAM INJ RIGHT Right 09/16/2018       Social History     Tobacco Use     Smoking status: Current Every Day Smoker     Years: 6.00     Types: Other     Smokeless tobacco: Never Used     Tobacco comment: Patient vapes   Substance Use Topics     Alcohol use: Yes     Comment: very occasionally     Family History   Problem Relation Age of Onset     Hypertension Mother      Vascular Disease Father          Current Outpatient Medications   Medication Sig Dispense Refill     aspirin (ASA) 81 MG chewable tablet Take 81 mg by mouth daily       pseudoePHEDrine (SUDAFED) 120 MG 12 hr tablet Take 120 mg by mouth every 12 hours       rivaroxaban ANTICOAGULANT (XARELTO) 20  MG TABS tablet Take 1 tablet (20 mg) by mouth daily (with dinner) (Patient not taking: Reported on 5/20/2019) 20 tablet 0     Allergies   Allergen Reactions     Amoxicillin Rash       Reviewed and updated as needed this visit by Provider         Review of Systems   Eyes: Negative.    Cardiovascular: Negative.    Gastrointestinal: Negative.    Endocrine: Negative.    Genitourinary: Negative.    Musculoskeletal: Negative.    Skin: Negative.    Neurological: Negative.    Psychiatric/Behavioral: Negative.             Objective    /78   Pulse 92   Temp 97.2  F (36.2  C) (Oral)   SpO2 97%   There is no height or weight on file to calculate BMI.  Physical Exam   Constitutional: He is oriented to person, place, and time. He appears well-developed and well-nourished. No distress.   HENT:   Head: Normocephalic.   Right Ear: Tympanic membrane, external ear and ear canal normal.   Left Ear: Tympanic membrane, external ear and ear canal normal.   Nose: Nose normal. Right sinus exhibits no maxillary sinus tenderness and no frontal sinus tenderness. Left sinus exhibits no maxillary sinus tenderness and no frontal sinus tenderness.   Mouth/Throat: Uvula is midline and mucous membranes are normal. Posterior oropharyngeal erythema present. No oropharyngeal exudate or posterior oropharyngeal edema.   Eyes: Conjunctivae and EOM are normal.   Neck: Neck supple.   Pulmonary/Chest: Effort normal.   Neurological: He is alert and oriented to person, place, and time.   Skin: He is not diaphoretic.   Psychiatric: He has a normal mood and affect. His behavior is normal.          Diagnostic Test Results:  No results found for this or any previous visit (from the past 24 hour(s)).        Assessment & Plan   Problem List Items Addressed This Visit     None      Visit Diagnoses     URI with cough and congestion    -  Primary           Tobacco Cessation:   reports that he has been smoking other.  He has smoked for the past 6.00 years. He  has never used smokeless tobacco.  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 Flonase - use one spray in each nostril once a day  2. Sinus Rinse or Neti Pot - these can be purchased at any pharmacy.  Use 1-2 times a day to relieve sinus congestion.     Additional treatment options for symptom relieve:  3. Take ibuprofen and acetaminophen (Tylenol) as needed for pain and/or fever.   4. Mucinex (guaifenisen) may help to thin the nasal mucus  5. Humidifiers or diffusers.  There is some evidence to support using essential oils such as eucalyptus, peppermint, citrus blends, or oregano in a diffuser for nasal congestion.  I do not recommend drinking the oils or placing them directly on the skin, since the concentrations of the oils are not regulated and vary between brands.     Most sinus infections are caused by a virus.  Antibiotics are only considered for sinus infections lasting 10 or more days.          Return in about 1 week (around 5/27/2019) for a recheck if you are not improved.    Kirstin Victoria PA-C  [facility]

## 2019-05-20 NOTE — PATIENT INSTRUCTIONS
Treatments for sinus infection:  1. Flonase - use one spray in each nostril once a day  2. Sinus Rinse or Neti Pot - these can be purchased at any pharmacy.  Use 1-2 times a day to relieve sinus congestion.     Additional treatment options for symptom relieve:  3. Take ibuprofen and acetaminophen (Tylenol) as needed for pain and/or fever.   4. Mucinex (guaifenisen) may help to thin the nasal mucus  5. Humidifiers or diffusers.  There is some evidence to support using essential oils such as eucalyptus, peppermint, citrus blends, or oregano in a diffuser for nasal congestion.  I do not recommend drinking the oils or placing them directly on the skin, since the concentrations of the oils are not regulated and vary between brands.     Most sinus infections are caused by a virus.  Antibiotics are only considered for sinus infections lasting 10 or more days.

## 2019-05-20 NOTE — LETTER
[facility]  [address]  [place] [address]  Phone: [phone]  Fax: [phone]    May 20, 2019        Rickie Lagos  [facility] [address] [address]          To whom it may concern:    RE: Rickie Lagos    Patient was seen and treated today at our clinic and missed work.  Please excuse him from work on 5/20/19 - 5/24/19 as needed for this illness.     Please contact me for questions or concerns.      Sincerely,        Kirstin Victoria PA-C

## 2019-05-21 ASSESSMENT — ENCOUNTER SYMPTOMS
GASTROINTESTINAL NEGATIVE: 1
ENDOCRINE NEGATIVE: 1
NEUROLOGICAL NEGATIVE: 1
EYES NEGATIVE: 1
PSYCHIATRIC NEGATIVE: 1
CARDIOVASCULAR NEGATIVE: 1
MUSCULOSKELETAL NEGATIVE: 1

## 2021-06-02 ENCOUNTER — RECORDS - HEALTHEAST (OUTPATIENT)
Dept: ADMINISTRATIVE | Facility: CLINIC | Age: 31
End: 2021-06-02

## 2022-08-21 NOTE — PROGRESS NOTES
HOSPITALIST CONSULT CHART CHECK:      Hospitalist service was consulted for cross coverage only. We will peripherally follow and chart check throughout the week.        - For vascular medical concerns during business hours M-F, call the [facility] at [phone] to have the rounding/on call Vascular Medicine (NOT VASCULAR SURGERY) MD paged.      - After business hours M-F, for medical concerns on this patient, please page hospitalist staff.      - For vascular surgical questions, please page the appropriate surgeon (primary vascular surgeon or on call vascular surgeon) based upon the time of day.     Celia Tomas PA-C  Hospitalist Physician Assistant  Pager: [phone]  9/26/2018 8:00 PM     DISPLAY PLAN FREE TEXT

## (undated) DEVICE — SU VICRYL 2-0 CT-2 27" J333H

## (undated) DEVICE — SU SILK 2-0 TIE 24" SA75H

## (undated) DEVICE — DRSG GAUZE 4X4" 3033

## (undated) DEVICE — BLADE KNIFE SURG 15 371115

## (undated) DEVICE — SU SILK 2-0 FSL 18" 677G

## (undated) DEVICE — ENDO TROCAR THORACIC 10/12MM TT012

## (undated) DEVICE — SUCTION CANISTER MEDIVAC LINER 3000ML W/LID 65651-530

## (undated) DEVICE — DRAPE BREAST/CHEST 29420

## (undated) DEVICE — DRAIN JACKSON PRATT RESERVOIR 100ML SU130-1305

## (undated) DEVICE — DRAPE STOCKINETTE IMPERVIOUS 12" 1587

## (undated) DEVICE — ANTIFOG SOLUTION W/FOAM PAD 31142527

## (undated) DEVICE — GOWN IMPERVIOUS ZONED LG

## (undated) DEVICE — ESU CORD MONOPOLAR 10'  E0510

## (undated) DEVICE — ESU GROUND PAD UNIVERSAL W/O CORD

## (undated) DEVICE — TUBING SUCTION MEDI-VAC SOFT 3/16"X20' N520A

## (undated) DEVICE — SPONGE RAY-TEC 4X8" 7318

## (undated) DEVICE — LINEN TOWEL PACK X5 5464

## (undated) DEVICE — DRSG STERI STRIP 1/2X4" R1547

## (undated) DEVICE — ESU ELEC BLADE 6" COATED/INSULATED E1455-6

## (undated) DEVICE — SU VICRYL 4-0 PS-2 18" UND J496H

## (undated) DEVICE — SU VICRYL 1 CT-2 27" J335H

## (undated) DEVICE — GLOVE PROTEXIS W/NEU-THERA 6.5  2D73TE65

## (undated) DEVICE — GLOVE PROTEXIS W/NEU-THERA 7.5  2D73TE75

## (undated) DEVICE — TUBING SUCTION 12"X1/4" N612

## (undated) DEVICE — PACK MAJOR SBA15MAFSI

## (undated) DEVICE — DRAPE IOBAN INCISE 23X17" 6650EZ

## (undated) DEVICE — DRAIN JACKSON PRATT 19FR ROUND SU130-1325

## (undated) DEVICE — SUCTION DRY CHEST DRAIN OASIS 3600-100

## (undated) DEVICE — LINEN GOWN OVERSIZE 5408

## (undated) DEVICE — SU VICRYL 2-0 CT-1 27" J339H

## (undated) DEVICE — SYR BULB IRRIG 50ML LATEX FREE 0035280

## (undated) DEVICE — Device

## (undated) DEVICE — ESU ELEC BLADE 6" COATED E1450-6

## (undated) DEVICE — DRSG KERLIX 4 1/2"X4YDS ROLL 6715

## (undated) DEVICE — SOL NACL 0.9% IRRIG 1000ML BOTTLE 07138-09

## (undated) DEVICE — DRAPE LAP W/ARMBOARD 29410

## (undated) DEVICE — SU SILK 2 REEL 60" SA8H

## (undated) DEVICE — DRAIN CHEST TUBE 24FR STR 8024

## (undated) DEVICE — SOL WATER IRRIG 1000ML BOTTLE 2F7114

## (undated) DEVICE — NDL 22GA 1.5"

## (undated) DEVICE — TAPE DRSG UNIVERSAL CLOTH 3" WHITE LATEX 881-3

## (undated) DEVICE — PACK MINOR SBA15MIFSE

## (undated) DEVICE — SU NDL CUT REV MED 3/8 209014

## (undated) DEVICE — SURGICEL HEMOSTAT 2X3" 1953

## (undated) DEVICE — SU VICRYL 3-0 SH 27" J316H

## (undated) DEVICE — DRAPE POUCH INSTRUMENT 1018

## (undated) DEVICE — SU MONOCRYL 4-0 PS-2 18" UND Y496G

## (undated) DEVICE — PREP CHLORAPREP 26ML TINTED ORANGE  260815

## (undated) DEVICE — SU ETHILON 3-0 PS-1 18" 1663G

## (undated) DEVICE — DRAIN CHEST TUBE RIGHT ANGLED 28FR 8128

## (undated) RX ORDER — FENTANYL CITRATE 50 UG/ML
INJECTION, SOLUTION INTRAMUSCULAR; INTRAVENOUS
Status: DISPENSED
Start: 2018-10-04

## (undated) RX ORDER — PROPOFOL 10 MG/ML
INJECTION, EMULSION INTRAVENOUS
Status: DISPENSED
Start: 2018-09-28

## (undated) RX ORDER — DEXAMETHASONE SODIUM PHOSPHATE 4 MG/ML
INJECTION, SOLUTION INTRA-ARTICULAR; INTRALESIONAL; INTRAMUSCULAR; INTRAVENOUS; SOFT TISSUE
Status: DISPENSED
Start: 2018-10-04

## (undated) RX ORDER — HYDROMORPHONE HYDROCHLORIDE 1 MG/ML
INJECTION, SOLUTION INTRAMUSCULAR; INTRAVENOUS; SUBCUTANEOUS
Status: DISPENSED
Start: 2018-09-28

## (undated) RX ORDER — ALBUMIN, HUMAN INJ 5% 5 %
SOLUTION INTRAVENOUS
Status: DISPENSED
Start: 2018-10-04

## (undated) RX ORDER — CLINDAMYCIN PHOSPHATE 900 MG/50ML
INJECTION, SOLUTION INTRAVENOUS
Status: DISPENSED
Start: 2018-10-04

## (undated) RX ORDER — LIDOCAINE HYDROCHLORIDE 20 MG/ML
INJECTION, SOLUTION EPIDURAL; INFILTRATION; INTRACAUDAL; PERINEURAL
Status: DISPENSED
Start: 2018-10-04

## (undated) RX ORDER — ACETAMINOPHEN 325 MG/1
TABLET ORAL
Status: DISPENSED
Start: 2018-10-04

## (undated) RX ORDER — GLYCOPYRROLATE 0.2 MG/ML
INJECTION, SOLUTION INTRAMUSCULAR; INTRAVENOUS
Status: DISPENSED
Start: 2018-09-28

## (undated) RX ORDER — FENTANYL CITRATE 50 UG/ML
INJECTION, SOLUTION INTRAMUSCULAR; INTRAVENOUS
Status: DISPENSED
Start: 2018-09-28

## (undated) RX ORDER — ONDANSETRON 2 MG/ML
INJECTION INTRAMUSCULAR; INTRAVENOUS
Status: DISPENSED
Start: 2018-10-04

## (undated) RX ORDER — CEFAZOLIN SODIUM 1 G/3ML
INJECTION, POWDER, FOR SOLUTION INTRAMUSCULAR; INTRAVENOUS
Status: DISPENSED
Start: 2018-09-28

## (undated) RX ORDER — KETOROLAC TROMETHAMINE 30 MG/ML
INJECTION, SOLUTION INTRAMUSCULAR; INTRAVENOUS
Status: DISPENSED
Start: 2018-09-28

## (undated) RX ORDER — LIDOCAINE HYDROCHLORIDE 20 MG/ML
INJECTION, SOLUTION EPIDURAL; INFILTRATION; INTRACAUDAL; PERINEURAL
Status: DISPENSED
Start: 2018-09-28

## (undated) RX ORDER — LORAZEPAM 0.5 MG/1
TABLET ORAL
Status: DISPENSED
Start: 2018-09-26

## (undated) RX ORDER — NEOSTIGMINE METHYLSULFATE 1 MG/ML
VIAL (ML) INJECTION
Status: DISPENSED
Start: 2018-09-28

## (undated) RX ORDER — BUPIVACAINE HYDROCHLORIDE 5 MG/ML
INJECTION, SOLUTION EPIDURAL; INTRACAUDAL
Status: DISPENSED
Start: 2018-10-04

## (undated) RX ORDER — MEPERIDINE HYDROCHLORIDE 25 MG/ML
INJECTION INTRAMUSCULAR; INTRAVENOUS; SUBCUTANEOUS
Status: DISPENSED
Start: 2018-10-04

## (undated) RX ORDER — PROPOFOL 10 MG/ML
INJECTION, EMULSION INTRAVENOUS
Status: DISPENSED
Start: 2018-10-04

## (undated) RX ORDER — ONDANSETRON 2 MG/ML
INJECTION INTRAMUSCULAR; INTRAVENOUS
Status: DISPENSED
Start: 2018-09-28

## (undated) RX ORDER — VECURONIUM BROMIDE 1 MG/ML
INJECTION, POWDER, LYOPHILIZED, FOR SOLUTION INTRAVENOUS
Status: DISPENSED
Start: 2018-09-28

## (undated) RX ORDER — HYDROMORPHONE HYDROCHLORIDE 1 MG/ML
INJECTION, SOLUTION INTRAMUSCULAR; INTRAVENOUS; SUBCUTANEOUS
Status: DISPENSED
Start: 2018-10-04

## (undated) RX ORDER — DEXAMETHASONE SODIUM PHOSPHATE 4 MG/ML
INJECTION, SOLUTION INTRA-ARTICULAR; INTRALESIONAL; INTRAMUSCULAR; INTRAVENOUS; SOFT TISSUE
Status: DISPENSED
Start: 2018-09-28